# Patient Record
Sex: FEMALE | Race: WHITE | Employment: FULL TIME | URBAN - METROPOLITAN AREA
[De-identification: names, ages, dates, MRNs, and addresses within clinical notes are randomized per-mention and may not be internally consistent; named-entity substitution may affect disease eponyms.]

---

## 2021-02-10 ENCOUNTER — TELEPHONE (OUTPATIENT)
Dept: PRIMARY CARE CLINIC | Age: 26
End: 2021-02-10

## 2021-02-10 ENCOUNTER — VIRTUAL VISIT (OUTPATIENT)
Dept: PRIMARY CARE CLINIC | Age: 26
End: 2021-02-10
Payer: COMMERCIAL

## 2021-02-10 DIAGNOSIS — Z11.4 SCREENING FOR HUMAN IMMUNODEFICIENCY VIRUS: ICD-10-CM

## 2021-02-10 DIAGNOSIS — G43.829 PREMENSTRUAL HEADACHE: Primary | ICD-10-CM

## 2021-02-10 DIAGNOSIS — Z00.00 ROUTINE ADULT HEALTH MAINTENANCE: ICD-10-CM

## 2021-02-10 DIAGNOSIS — Z12.4 CERVICAL CANCER SCREENING: ICD-10-CM

## 2021-02-10 DIAGNOSIS — Z11.59 ENCOUNTER FOR HEPATITIS C SCREENING TEST FOR LOW RISK PATIENT: ICD-10-CM

## 2021-02-10 DIAGNOSIS — N92.6 IRREGULAR MENSTRUAL CYCLE: ICD-10-CM

## 2021-02-10 PROCEDURE — 99203 OFFICE O/P NEW LOW 30 MIN: CPT | Performed by: NURSE PRACTITIONER

## 2021-02-10 RX ORDER — MULTIVIT-MIN/IRON/FOLIC ACID/K 18-600-40
1 CAPSULE ORAL DAILY
COMMUNITY
Start: 2021-02-10 | End: 2021-04-20

## 2021-02-10 RX ORDER — MELATONIN
1000 DAILY
COMMUNITY
Start: 2021-02-10 | End: 2021-04-20

## 2021-02-10 RX ORDER — MULTIVIT-MIN/IRON FUM/FOLIC AC 7.5 MG-4
1 TABLET ORAL DAILY
COMMUNITY
Start: 2021-02-10 | End: 2021-04-20

## 2021-02-10 RX ORDER — NORETHINDRONE ACETATE AND ETHINYL ESTRADIOL 1.5-30(21)
1 KIT ORAL DAILY
Qty: 3 PACKET | Refills: 3 | Status: SHIPPED | OUTPATIENT
Start: 2021-02-10 | End: 2022-08-01

## 2021-02-10 RX ORDER — IBUPROFEN 200 MG
400 TABLET ORAL EVERY 8 HOURS PRN
COMMUNITY
Start: 2021-02-10 | End: 2021-04-20

## 2021-02-10 SDOH — ECONOMIC STABILITY: TRANSPORTATION INSECURITY
IN THE PAST 12 MONTHS, HAS LACK OF TRANSPORTATION KEPT YOU FROM MEETINGS, WORK, OR FROM GETTING THINGS NEEDED FOR DAILY LIVING?: NO

## 2021-02-10 SDOH — ECONOMIC STABILITY: FOOD INSECURITY: WITHIN THE PAST 12 MONTHS, THE FOOD YOU BOUGHT JUST DIDN'T LAST AND YOU DIDN'T HAVE MONEY TO GET MORE.: NEVER TRUE

## 2021-02-10 SDOH — ECONOMIC STABILITY: TRANSPORTATION INSECURITY
IN THE PAST 12 MONTHS, HAS THE LACK OF TRANSPORTATION KEPT YOU FROM MEDICAL APPOINTMENTS OR FROM GETTING MEDICATIONS?: NO

## 2021-02-10 SDOH — ECONOMIC STABILITY: INCOME INSECURITY: HOW HARD IS IT FOR YOU TO PAY FOR THE VERY BASICS LIKE FOOD, HOUSING, MEDICAL CARE, AND HEATING?: NOT HARD AT ALL

## 2021-02-10 SDOH — ECONOMIC STABILITY: FOOD INSECURITY: WITHIN THE PAST 12 MONTHS, YOU WORRIED THAT YOUR FOOD WOULD RUN OUT BEFORE YOU GOT MONEY TO BUY MORE.: NEVER TRUE

## 2021-02-10 ASSESSMENT — PATIENT HEALTH QUESTIONNAIRE - PHQ9
SUM OF ALL RESPONSES TO PHQ QUESTIONS 1-9: 0
SUM OF ALL RESPONSES TO PHQ QUESTIONS 1-9: 0
2. FEELING DOWN, DEPRESSED OR HOPELESS: 0

## 2021-02-10 NOTE — PATIENT INSTRUCTIONS
Patient Education        Well Visit, Ages 25 to 48: Care Instructions  Your Care Instructions     Physical exams can help you stay healthy. Your doctor has checked your overall health and may have suggested ways to take good care of yourself. He or she also may have recommended tests. At home, you can help prevent illness with healthy eating, regular exercise, and other steps. Follow-up care is a key part of your treatment and safety. Be sure to make and go to all appointments, and call your doctor if you are having problems. It's also a good idea to know your test results and keep a list of the medicines you take. How can you care for yourself at home? · Reach and stay at a healthy weight. This will lower your risk for many problems, such as obesity, diabetes, heart disease, and high blood pressure. · Get at least 30 minutes of physical activity on most days of the week. Walking is a good choice. You also may want to do other activities, such as running, swimming, cycling, or playing tennis or team sports. Discuss any changes in your exercise program with your doctor. · Do not smoke or allow others to smoke around you. If you need help quitting, talk to your doctor about stop-smoking programs and medicines. These can increase your chances of quitting for good. · Talk to your doctor about whether you have any risk factors for sexually transmitted infections (STIs). Having one sex partner (who does not have STIs and does not have sex with anyone else) is a good way to avoid these infections. · Use birth control if you do not want to have children at this time. Talk with your doctor about the choices available and what might be best for you. · Protect your skin from too much sun. When you're outdoors from 10 a.m. to 4 p.m., stay in the shade or cover up with clothing and a hat with a wide brim. Wear sunglasses that block UV rays. Even when it's cloudy, put broad-spectrum sunscreen (SPF 30 or higher) on any exposed skin. · See a dentist one or two times a year for checkups and to have your teeth cleaned. · Wear a seat belt in the car. Follow your doctor's advice about when to have certain tests. These tests can spot problems early. For everyone  · Cholesterol. Have the fat (cholesterol) in your blood tested after age 21. Your doctor will tell you how often to have this done based on your age, family history, or other things that can increase your risk for heart disease. · Blood pressure. Have your blood pressure checked during a routine doctor visit. Your doctor will tell you how often to check your blood pressure based on your age, your blood pressure results, and other factors. · Vision. Talk with your doctor about how often to have a glaucoma test.  · Diabetes. Ask your doctor whether you should have tests for diabetes. · Colon cancer. Your risk for colorectal cancer gets higher as you get older. Some experts say that adults should start regular screening at age 48 and stop at age 76. Others say to start before age 48 or continue after age 76. Talk with your doctor about your risk and when to start and stop screening. For women  · Breast exam and mammogram. Talk to your doctor about when you should have a clinical breast exam and a mammogram. Medical experts differ on whether and how often women under 50 should have these tests. Your doctor can help you decide what is right for you. · Cervical cancer screening test and pelvic exam. Begin with a Pap test at age 24. The test often is part of a pelvic exam. Starting at age 27, you may choose to have a Pap test, an HPV test, or both tests at the same time (called co-testing). Talk with your doctor about how often to have testing. · Tests for sexually transmitted infections (STIs). Ask whether you should have tests for STIs. You may be at risk if you have sex with more than one person, especially if your partners do not wear condoms. For men  · Tests for sexually transmitted infections (STIs). Ask whether you should have tests for STIs. You may be at risk if you have sex with more than one person, especially if you do not wear a condom. · Testicular cancer exam. Ask your doctor whether you should check your testicles regularly. · Prostate exam. Talk to your doctor about whether you should have a blood test (called a PSA test) for prostate cancer. Experts differ on whether and when men should have this test. Some experts suggest it if you are older than 39 and are -American or have a father or brother who got prostate cancer when he was younger than 72. When should you call for help? Watch closely for changes in your health, and be sure to contact your doctor if you have any problems or symptoms that concern you. Where can you learn more? Go to https://CloudJayaniya.healthVidSys. org and sign in to your [employer] account. Enter P072 in the [facility] box to learn more about \"Well Visit, Ages 25 to 48: Care Instructions. \"     If you do not have an account, please click on the \"Sign Up Now\" link. Current as of: May 27, 2020               Content Version: 12.6  © 4398-3268 [employer]. Care instructions adapted under license by [facility] ([facility]). If you have questions about a medical condition or this instruction, always ask your healthcare professional. [address] any warranty or liability for your use of this information. Patient Education        Headache: Care Instructions  Your Care Instructions     Headaches have many possible causes. Most headaches aren't a sign of a more serious problem, and they will get better on their own. Home treatment may help you feel better faster. The doctor has checked you carefully, but problems can develop later. If you notice any problems or new symptoms, get medical treatment right away. Follow-up care is a key part of your treatment and safety. Be sure to make and go to all appointments, and call your doctor if you are having problems. It's also a good idea to know your test results and keep a list of the medicines you take. How can you care for yourself at home? · Do not drive if you have taken a prescription pain medicine. · Rest in a quiet, dark room until your headache is gone. Close your eyes and try to relax or go to sleep. Don't watch TV or read. · Put a cold, moist cloth or cold pack on the painful area for 10 to 20 minutes at a time. Put a thin cloth between the cold pack and your skin. · Use a warm, moist towel or a heating pad set on low to relax tight shoulder and neck muscles. · Have someone gently massage your neck and shoulders. · Take pain medicines exactly as directed. ? If the doctor gave you a prescription medicine for pain, take it as prescribed. ? If you are not taking a prescription pain medicine, ask your doctor if you can take an over-the-counter medicine. · Be careful not to take pain medicine more often than the instructions allow, because you may get worse or more frequent headaches when the medicine wears off. · Do not ignore new symptoms that occur with a headache, such as a fever, weakness or numbness, vision changes, or confusion. These may be signs of a more serious problem. To prevent headaches  · Keep a headache diary so you can figure out what triggers your headaches. Avoiding triggers may help you prevent headaches. Record when each headache began, how long it lasted, and what the pain was like (throbbing, aching, stabbing, or dull). Write down any other symptoms you had with the headache, such as nausea, flashing lights or dark spots, or sensitivity to bright light or loud noise. Note if the headache occurred near your period. List anything that might have triggered the headache, such as certain foods (chocolate, cheese, wine) or odors, smoke, bright light, stress, or lack of sleep. · Find healthy ways to deal with stress. Headaches are most common during or right after stressful times. Take time to relax before and after you do something that has caused a headache in the past.  · Try to keep your muscles relaxed by keeping good posture. Check your jaw, face, neck, and shoulder muscles for tension, and try relaxing them. When sitting at a desk, change positions often, and stretch for 30 seconds each hour. · Get plenty of sleep and exercise. · Eat regularly and well. Long periods without food can trigger a headache. · Treat yourself to a massage. Some people find that regular massages are very helpful in relieving tension. · Limit caffeine by not drinking too much coffee, tea, or soda. But don't quit caffeine suddenly, because that can also give you headaches. · Reduce eyestrain from computers by blinking frequently and looking away from the computer screen every so often. Make sure you have proper eyewear and that your monitor is set up properly, about an arm's length away. · Seek help if you have depression or anxiety. Your headaches may be linked to these conditions. Treatment can both prevent headaches and help with symptoms of anxiety or depression. When should you call for help? Call 911 anytime you think you may need emergency care. For example, call if:    · You have signs of a stroke. These may include:  ? Sudden numbness, paralysis, or weakness in your face, arm, or leg, especially on only one side of your body. ? Sudden vision changes. ? Sudden trouble speaking. ? Sudden confusion or trouble understanding simple statements. ? Sudden problems with walking or balance. ? A sudden, severe headache that is different from past headaches. Call your doctor now or seek immediate medical care if:    · You have a new or worse headache.     · Your headache gets much worse. Where can you learn more? Go to https://Istpikaperayeweb.KakKstati. org and sign in to your "[employer]" account. Enter 0478 [id number] in the [employer] box to learn more about \"Headache: Care Instructions. \"     If you do not have an account, please click on the \"Sign Up Now\" link. Current as of: November 20, 2019               Content Version: 12.6  © 1658-4194 [employer]. Care instructions adapted under license by [facility] ([facility]). If you have questions about a medical condition or this instruction, always ask your healthcare professional. [address] any warranty or liability for your use of this information.

## 2021-02-10 NOTE — PROGRESS NOTES
2/10/2021    TELEHEALTH EVALUATION -- Audio/Visual (During ZCNQO-92 public health emergency)    Chief Complaint   Patient presents with    New Patient        HPI:    Iram Guadalupe (: 1995) has requested an audio/video evaluation for the following concern(s): To establish as a new patient to [facility] ([facility]) and myself. Hx of \"really bad headaches\" prior to starting period with headache lasting several days. Denies headache today. Denies headaches during menstrual period. Reports irregular periods; reports occasional months without having a period. Periods last anywhere from 5-7 days to 2 weeks. Patient has been sexually in the past, not currently. Menstrual cycle was regular while taking oral contraceptives in the past. Last taken 6 months ago. Stopped taking due to running out of prescription, moving to [place] and insurance change. Requests prescription for oral contraceptive today. First day of last menstrual period was 2021. Denies chance she could be pregnant today. Denies hx of blood clots or bleeding disorders. Denies family history of clotting disorders.        - Last PAP was 2019 by gynecologist in [facility]. Denies history of abnormal PAP. Requests referral to local gynecologist.     - Discussed screening for HIV & Hep C- will order today. - Patient does not take multivitamin or any supplements. - Influenza vaccine not administered for  flu season; strongly encouraged patient to be vaccinated at local pharmacy.   - Immunization record not on file; instructed patient to obtain records and fax to office (office card provided with referral information. Review of Systems:  Gen: Denies fever, chills, headaches. No weight loss. Eating and drinking to baseline. HEENT: Denies cold symptoms, sore throat. Denies changes in taste or smell. CV:  Denies chest pain or tightness, palpitations. Pulm: Denies shortness of breath, cough. Abd: Denies abdominal pain, change in bowel habits. Psych: Denies depression or anxiety. No current outpatient medications on file prior to visit. No current facility-administered medications on file prior to visit. Past Medical History:   Diagnosis Date    Polycystic ovarian syndrome 12/01/2019       History reviewed. No pertinent surgical history. Family History   Problem Relation Age of Onset    High Blood Pressure Mother     Heart Disease Father     High Blood Pressure Father     High Cholesterol Father     Other Father     No Known Problems Sister     Diabetes Maternal Grandmother     Diabetes Paternal Grandmother        Allergies   Allergen Reactions    Penicillins Shortness Of Breath    Tylenol [Acetaminophen] Hives       Social History     Tobacco Use    Smoking status: Never Smoker    Smokeless tobacco: Never Used   Substance Use Topics    Alcohol use: Yes     Comment: ocassional    Drug use: Never          PHYSICAL EXAMINATION:  Vital Signs: (As obtained by patient/caregiver or practitioner observation)  There were no vitals taken for this visit. Patient-Reported Vitals 2/10/2021   Patient-Reported Weight 186lb   Patient-Reported Height 5'3   Patient-Reported Temperature 97.4        Respiratory rate appears normal    Constitutional: Appears well-developed and well-nourished. No apparent distress    Mental status: Alert and awake. Oriented to person/place/time. Able to follow commands    Eyes: EOM normal. Sclera normal. No discharge visible  HENT: Normocephalic, atraumatic. Neck: No visualized mass   Pulmonary/Chest: Respiratory effort normal.  No visualized signs of difficulty breathing or respiratory distress. Speaking in full sentences without difficulty. Musculoskeletal: Normal range of motion of neck  Neurological: No Facial Asymmetry (Cranial nerve 7 motor function) (limited exam to video visit).  No gaze palsy       Skin: No significant exanthematous lesions or discoloration noted on facial skin No current facility-administered medications for this visit. Breezy Fraser is a 26 y.o. female being evaluated by a Virtual Visit (video visit) encounter to address concerns as mentioned above. A caregiver was present when appropriate. Due to this being a TeleHealth encounter (During SBEJX-17 public health emergency), evaluation of the following organ systems was limited: Vitals/Constitutional/EENT/Resp/CV/GI//MS/Neuro/Skin/Heme-Lymph-Imm. Pursuant to the emergency declaration under the [address] [place] [address], [address] [place] [address] authority and the [employer] Act, this Virtual Visit was conducted with patient's (and/or legal guardian's) consent, to reduce the patient's risk of exposure to COVID-19 and provide necessary medical care. The patient (and/or legal guardian) has also been advised to contact this office for worsening conditions or problems, and seek emergency medical treatment and/or call 911 if deemed necessary. Patient identification was verified at the start of the visit: Yes    Total time spent on this encounter: 25 minutes. Services were provided through a video synchronous discussion virtually to substitute for in-person clinic visit. Patient was located in their home. Provider was located in the office. --MARTA Zaragoza CNP on 2/10/2021 at 3:01 PM    An electronic signature was used to authenticate this note. Julia Caban

## 2021-04-20 ENCOUNTER — HOSPITAL ENCOUNTER (EMERGENCY)
Age: 26
Discharge: HOME | End: 2021-04-20
Payer: COMMERCIAL

## 2021-04-20 VITALS
TEMPERATURE: 98.2 F | HEART RATE: 108 BPM | OXYGEN SATURATION: 98 % | DIASTOLIC BLOOD PRESSURE: 90 MMHG | SYSTOLIC BLOOD PRESSURE: 137 MMHG | WEIGHT: 184.2 LBS | RESPIRATION RATE: 18 BRPM

## 2021-04-20 DIAGNOSIS — R51.9 ACUTE NONINTRACTABLE HEADACHE, UNSPECIFIED HEADACHE TYPE: Primary | ICD-10-CM

## 2021-04-20 LAB
ANION GAP SERPL CALCULATED.3IONS-SCNC: 8 MMOL/L (ref 3–16)
BASOPHILS ABSOLUTE: 0 K/UL (ref 0–0.2)
BASOPHILS RELATIVE PERCENT: 0.5 %
BUN BLDV-MCNC: 10 MG/DL (ref 7–20)
CALCIUM SERPL-MCNC: 9.1 MG/DL (ref 8.3–10.6)
CHLORIDE BLD-SCNC: 103 MMOL/L (ref 99–110)
CO2: 26 MMOL/L (ref 21–32)
CREAT SERPL-MCNC: 0.8 MG/DL (ref 0.6–1.1)
EOSINOPHILS ABSOLUTE: 0.3 K/UL (ref 0–0.6)
EOSINOPHILS RELATIVE PERCENT: 3.5 %
GFR AFRICAN AMERICAN: >60
GFR NON-AFRICAN AMERICAN: >60
GLUCOSE BLD-MCNC: 99 MG/DL (ref 70–99)
HCG QUALITATIVE: NEGATIVE
HCT VFR BLD CALC: 39.6 % (ref 36–48)
HEMOGLOBIN: 13.1 G/DL (ref 12–16)
LYMPHOCYTES ABSOLUTE: 2 K/UL (ref 1–5.1)
LYMPHOCYTES RELATIVE PERCENT: 22.6 %
MCH RBC QN AUTO: 29.5 PG (ref 26–34)
MCHC RBC AUTO-ENTMCNC: 33 G/DL (ref 31–36)
MCV RBC AUTO: 89.5 FL (ref 80–100)
MONOCYTES ABSOLUTE: 0.9 K/UL (ref 0–1.3)
MONOCYTES RELATIVE PERCENT: 10.4 %
NEUTROPHILS ABSOLUTE: 5.5 K/UL (ref 1.7–7.7)
NEUTROPHILS RELATIVE PERCENT: 63 %
PDW BLD-RTO: 13.8 % (ref 12.4–15.4)
PLATELET # BLD: 365 K/UL (ref 135–450)
PMV BLD AUTO: 8.9 FL (ref 5–10.5)
POTASSIUM REFLEX MAGNESIUM: 4.1 MMOL/L (ref 3.5–5.1)
RBC # BLD: 4.43 M/UL (ref 4–5.2)
SODIUM BLD-SCNC: 137 MMOL/L (ref 136–145)
WBC # BLD: 8.7 K/UL (ref 4–11)

## 2021-04-20 PROCEDURE — 2580000003 HC RX 258: Performed by: PHYSICIAN ASSISTANT

## 2021-04-20 PROCEDURE — 96375 TX/PRO/DX INJ NEW DRUG ADDON: CPT

## 2021-04-20 PROCEDURE — 99282 EMERGENCY DEPT VISIT SF MDM: CPT

## 2021-04-20 PROCEDURE — 80048 BASIC METABOLIC PNL TOTAL CA: CPT

## 2021-04-20 PROCEDURE — 6360000002 HC RX W HCPCS: Performed by: PHYSICIAN ASSISTANT

## 2021-04-20 PROCEDURE — 85025 COMPLETE CBC W/AUTO DIFF WBC: CPT

## 2021-04-20 PROCEDURE — 84703 CHORIONIC GONADOTROPIN ASSAY: CPT

## 2021-04-20 PROCEDURE — 96374 THER/PROPH/DIAG INJ IV PUSH: CPT

## 2021-04-20 RX ORDER — METOCLOPRAMIDE HYDROCHLORIDE 5 MG/ML
10 INJECTION INTRAMUSCULAR; INTRAVENOUS ONCE
Status: COMPLETED | OUTPATIENT
Start: 2021-04-20 | End: 2021-04-20

## 2021-04-20 RX ORDER — KETOROLAC TROMETHAMINE 30 MG/ML
15 INJECTION, SOLUTION INTRAMUSCULAR; INTRAVENOUS ONCE
Status: COMPLETED | OUTPATIENT
Start: 2021-04-20 | End: 2021-04-20

## 2021-04-20 RX ORDER — 0.9 % SODIUM CHLORIDE 0.9 %
1000 INTRAVENOUS SOLUTION INTRAVENOUS ONCE
Status: COMPLETED | OUTPATIENT
Start: 2021-04-20 | End: 2021-04-20

## 2021-04-20 RX ORDER — DIPHENHYDRAMINE HYDROCHLORIDE 50 MG/ML
25 INJECTION INTRAMUSCULAR; INTRAVENOUS ONCE
Status: COMPLETED | OUTPATIENT
Start: 2021-04-20 | End: 2021-04-20

## 2021-04-20 RX ADMIN — METOCLOPRAMIDE 10 MG: 5 INJECTION, SOLUTION INTRAMUSCULAR; INTRAVENOUS at 22:06

## 2021-04-20 RX ADMIN — DIPHENHYDRAMINE HYDROCHLORIDE 25 MG: 50 INJECTION, SOLUTION INTRAMUSCULAR; INTRAVENOUS at 22:05

## 2021-04-20 RX ADMIN — SODIUM CHLORIDE 1000 ML: 9 INJECTION, SOLUTION INTRAVENOUS at 22:04

## 2021-04-20 RX ADMIN — KETOROLAC TROMETHAMINE 15 MG: 30 INJECTION, SOLUTION INTRAMUSCULAR at 22:05

## 2021-04-20 NOTE — LETTER
[facility]  [address] [place] [address], [address]             April 20, 2021    Patient: Liyah Ortiz   YOB: 1995   Date of Visit: 4/20/2021       To Whom It May Concern:    Liyah Ortiz was seen and treated in our emergency department on 4/20/2021. She may return to work on 4/22/21.       Sincerely,         [facility]

## 2021-04-21 ASSESSMENT — ENCOUNTER SYMPTOMS
ABDOMINAL PAIN: 0
VOMITING: 0
SHORTNESS OF BREATH: 0
NAUSEA: 0

## 2021-04-21 NOTE — ED PROVIDER NOTES
[address] [facility]        Pt Name: Garcia Cuadra  MRN: [id number]  [place] 1995  Date of evaluation: 4/20/2021  Provider: Elie Bell PA-C  PCP: MARTA Chandler - CNP    SERGIO. I have evaluated this patient. My supervising physician was available for consultation. CHIEF COMPLAINT       Chief Complaint   Patient presents with    Migraine     pt. c/o headache states it is a migraine and feels dizzy. recent covid exposure. pt. got JJ covid vaccine on 3/14       HISTORY OF PRESENT ILLNESS   (Location, Timing/Onset, Context/Setting, Quality, Duration, Modifying Factors, Severity, Associated Signs and Symptoms)  Note limiting factors. Garcia Cuadra is a 26 y.o. female patient presents emergency department for evaluation of headache. Patient states it is a squeezing and throbbing sensation to the forehead and the back of her head. She states it has been off and on for the past few days. She states she has been unable to get it under control at home with ibuprofen or Excedrin. Patient has never seen neurology for her migraine headaches. She states she is not on any prescription medications. Patient got the [employer] vaccine on 3/14 but has felt fine since that time. Patient states this feels like her typical migraine headache. She states it is making her photophobic, slightly nauseated and slightly dizzy. She denies any vomiting, abdominal pain, chest pain or shortness of breath. She denies any neck pain or stiffness. Denies any fevers at home. Nursing Notes were all reviewed and agreed with or any disagreements were addressed in the HPI. REVIEW OF SYSTEMS    (2-9 systems for level 4, 10 or more for level 5)     Review of Systems   Constitutional: Negative for fatigue and fever. HENT: Negative. Eyes: Negative for visual disturbance. Respiratory: Negative for shortness of breath. Cardiovascular: Negative for chest pain. Gastrointestinal: Negative for abdominal pain, nausea and vomiting. Genitourinary: Negative. Musculoskeletal: Negative. Skin: Negative. Neurological: Positive for headaches. Positives and Pertinent negatives as per HPI. Except as noted above in the ROS, all other systems were reviewed and negative. PAST MEDICAL HISTORY     Past Medical History:   Diagnosis Date    Polycystic ovarian syndrome 12/01/2019         SURGICAL HISTORY   History reviewed. No pertinent surgical history. [id number]       Discharge Medication List as of 4/20/2021 11:31 PM      CONTINUE these medications which have NOT CHANGED    Details   norethindrone-ethinyl estradiol-iron (LOESTRIN FE 1.5/30) 1.5-30 MG-MCG tablet Take 1 tablet by mouth daily, Disp-3 packet, R-3Normal               ALLERGIES     Penicillins and Tylenol [acetaminophen]    FAMILYHISTORY       Family History   Problem Relation Age of Onset    High Blood Pressure Mother     Heart Disease Father     High Blood Pressure Father     High Cholesterol Father     Other Father     No Known Problems Sister     Diabetes Maternal Grandmother     Diabetes Paternal Grandmother           SOCIAL HISTORY       Social History     Tobacco Use    Smoking status: Never Smoker    Smokeless tobacco: Never Used   Substance Use Topics    Alcohol use: Yes     Comment: ocassional    Drug use: Never       SCREENINGS             PHYSICAL EXAM    (up to 7 for level 4, 8 or more for level 5)     ED Triage Vitals [04/20/21 2100]   BP Temp Temp Source Pulse Resp SpO2 Height Weight   (!) 137/90 98.2 °F (36.8 °C) Infrared 108 18 98 % -- 184 lb 3.2 oz (83.6 kg)       Physical Exam  Vitals signs and nursing note reviewed. Constitutional:       General: She is not in acute distress. Appearance: Normal appearance. She is well-developed. She is not ill-appearing, toxic-appearing or diaphoretic.    HENT:      Head: Normocephalic and atraumatic. Right Ear: Tympanic membrane, ear canal and external ear normal.      Left Ear: Tympanic membrane, ear canal and external ear normal.      Nose: Nose normal.   Eyes:      General:         Right eye: No discharge. Left eye: No discharge. Conjunctiva/sclera: Conjunctivae normal.      Pupils: Pupils are equal, round, and reactive to light. Neck:      Musculoskeletal: Normal range of motion and neck supple. No neck rigidity or muscular tenderness. Cardiovascular:      Rate and Rhythm: Normal rate and regular rhythm. Pulses: Normal pulses. Heart sounds: No murmur. No gallop. Pulmonary:      Effort: Pulmonary effort is normal. No respiratory distress. Breath sounds: No wheezing, rhonchi or rales. Musculoskeletal: Normal range of motion. Skin:     General: Skin is warm and dry. Capillary Refill: Capillary refill takes less than 2 seconds. Coloration: Skin is not jaundiced or pale. Findings: No rash. Neurological:      General: No focal deficit present. Mental Status: She is alert and oriented to person, place, and time. Psychiatric:         Behavior: Behavior normal.         DIAGNOSTIC RESULTS   LABS:    Labs Reviewed   CBC WITH AUTO DIFFERENTIAL    Narrative:     Performed at:  [facility] [address], [place], [facility]   Phone [phone]   BASIC METABOLIC PANEL W/ REFLEX TO MG FOR LOW K    Narrative:     Performed at:  [facility] [address], [place], [facility] [place]   Phone [phone]   HCG, SERUM, QUALITATIVE    Narrative:     Performed at:  [facility] [address], [place], [facility] [place]   Phone [phone]       All other labs were within normal range or not returned as of this dictation. EKG:  All EKG's are interpreted by the Emergency Department Physician in the absence of a cardiologist.  Please see their note for interpretation of EKG. RADIOLOGY:   Non-plain film images such as CT, Ultrasound and MRI are read by the radiologist. Plain radiographic images are visualized and preliminarily interpreted by the ED Provider with the below findings:        Interpretation per the Radiologist below, if available at the time of this note:    No orders to display     No results found. PROCEDURES   Unless otherwise noted below, none     Procedures    CRITICAL CARE TIME   N/A    CONSULTS:  None      EMERGENCY DEPARTMENT COURSE and DIFFERENTIAL DIAGNOSIS/MDM:   Vitals:    Vitals:    04/20/21 2100   BP: (!) 137/90   Pulse: 108   Resp: 18   Temp: 98.2 °F (36.8 °C)   TempSrc: Infrared   SpO2: 98%   Weight: 184 lb 3.2 oz (83.6 kg)       Patient was given the following medications:  Medications   0.9 % sodium chloride bolus (0 mLs Intravenous Stopped 4/20/21 2315)   ketorolac (TORADOL) injection 15 mg (15 mg Intravenous Given 4/20/21 2205)   diphenhydrAMINE (BENADRYL) injection 25 mg (25 mg Intravenous Given 4/20/21 2205)   metoclopramide (REGLAN) injection 10 mg (10 mg Intravenous Given 4/20/21 2206)       Patient presents emergency department for evaluation of headache. Patient states this feels like her typical migraine type headache. She states it is squeezing it to her forehead and the back of her head. She states it is occasionally throbbing. She states it makes her photophobic and lightheaded. Patient has not had any syncope. She denies any room spinning dizziness. Patient was given saline Toradol Benadryl Reglan. On examination pupils are equal and reactive to light. TMs normal bilaterally without evidence of hemotympanum. Patient's lungs are clear to auscultation bilaterally. Heart rate is regular without murmurs rubs or gallops. No neck rigidity or meningismus. She does not appear acutely ill. On reevaluation patient states she is feeling much improved.   She states she is feeling well enough to go home at this time. She was encouraged to follow-up with PCP if she notes that her headaches are becoming more frequent. She additionally was encouraged to try magnesium or riboflavin daily for prevention of her migraines. Patient expresses understanding will be discharged home. Patient's repeat neurologic examination is normal.  My suspicion for serious pathology is low given a lack of significant risk factors and reassuring history and physical examination. I see nothing to suggest intracranial hemorrhage, meningitis, encephalitis, mass lesion, stroke or thrombosis. I feel the patient can be safely discharged to home with outpatient follow up. Instructions have been given for the patient to return if there is any significant worsening of the headache or the development of confusion, vision change, weakness, numbness, difficulty with speech or walking. FINAL IMPRESSION      1.  Acute nonintractable headache, unspecified headache type          DISPOSITION/PLAN   DISPOSITION Decision To Discharge 04/20/2021 11:28:48 PM      PATIENT REFERREDTO:  [facility] Emergency Department  [address] [facility]  [phone]    If symptoms worsen    MARTA Koenig - JESSI  [address] [place] [address]. [address]  [phone]    Schedule an appointment as soon as possible for a visit in 3 days  for re-evaluation      DISCHARGE MEDICATIONS:  Discharge Medication List as of 4/20/2021 11:31 PM          DISCONTINUED MEDICATIONS:  Discharge Medication List as of 4/20/2021 11:31 PM      STOP taking these medications       Multiple Vitamins-Minerals (MULTIVITAMIN WITH MINERALS) tablet Comments:   Reason for Stopping:         vitamin D3 (CHOLECALCIFEROL) 25 MCG (1000 UT) TABS tablet Comments:   Reason for Stopping:         Ascorbic Acid (VITAMIN C) 500 MG CAPS Comments:   Reason for Stopping:         ibuprofen (ADVIL) 200 MG tablet Comments:   Reason for Stopping:

## 2021-04-30 ENCOUNTER — OFFICE VISIT (OUTPATIENT)
Dept: PRIMARY CARE CLINIC | Age: 26
End: 2021-04-30
Payer: COMMERCIAL

## 2021-04-30 VITALS
OXYGEN SATURATION: 98 % | WEIGHT: 189 LBS | HEART RATE: 94 BPM | HEIGHT: 64 IN | BODY MASS INDEX: 32.27 KG/M2 | DIASTOLIC BLOOD PRESSURE: 82 MMHG | SYSTOLIC BLOOD PRESSURE: 132 MMHG

## 2021-04-30 DIAGNOSIS — R20.2 RIGHT HAND PARESTHESIA: ICD-10-CM

## 2021-04-30 DIAGNOSIS — M25.531 RIGHT WRIST PAIN: ICD-10-CM

## 2021-04-30 DIAGNOSIS — G43.109 MIGRAINE WITH AURA AND WITHOUT STATUS MIGRAINOSUS, NOT INTRACTABLE: Primary | ICD-10-CM

## 2021-04-30 PROCEDURE — 99214 OFFICE O/P EST MOD 30 MIN: CPT | Performed by: NURSE PRACTITIONER

## 2021-04-30 PROCEDURE — 1111F DSCHRG MED/CURRENT MED MERGE: CPT | Performed by: NURSE PRACTITIONER

## 2021-04-30 RX ORDER — ONDANSETRON HYDROCHLORIDE 8 MG/1
8 TABLET, FILM COATED ORAL EVERY 8 HOURS PRN
Qty: 20 TABLET | Refills: 1 | Status: SHIPPED | OUTPATIENT
Start: 2021-04-30 | End: 2021-12-17 | Stop reason: SDUPTHER

## 2021-04-30 RX ORDER — SUMATRIPTAN 50 MG/1
TABLET, FILM COATED ORAL
Qty: 12 TABLET | Refills: 2 | Status: SHIPPED | OUTPATIENT
Start: 2021-04-30 | End: 2022-08-01

## 2021-04-30 ASSESSMENT — ENCOUNTER SYMPTOMS: RESPIRATORY NEGATIVE: 1

## 2021-04-30 NOTE — PATIENT INSTRUCTIONS
Patient Education        Migraine Headache: Care Instructions  Overview     Migraines are painful, throbbing headaches that often start on one side of the head. They may cause nausea and vomiting and make you sensitive to light, sound, or smell. Without treatment, migraines can last from 4 hours to a few days. Medicines can help prevent migraines or stop them after they have started. Your doctor can help you find which ones work best for you. Follow-up care is a key part of your treatment and safety. Be sure to make and go to all appointments, and call your doctor if you are having problems. It's also a good idea to know your test results and keep a list of the medicines you take. How can you care for yourself at home? · Do not drive if you have taken a prescription pain medicine. · Rest in a quiet, dark room until your headache is gone. Close your eyes, and try to relax or go to sleep. Don't watch TV or read. · Put a cold, moist cloth or cold pack on the painful area for 10 to 20 minutes at a time. Put a thin cloth between the cold pack and your skin. · Use a warm, moist towel or a heating pad set on low to relax tight shoulder and neck muscles. · Have someone gently massage your neck and shoulders. · Take your medicines exactly as prescribed. Call your doctor if you think you are having a problem with your medicine. You will get more details on the specific medicines your doctor prescribes. · Don't take medicine for headache pain too often. Talk to your doctor if you are taking medicine more than 2 days a week to stop a headache. Taking too much pain medicine can lead to more headaches. These are called medicine-overuse headaches. To prevent migraines  · Keep a headache diary so you can figure out what triggers your headaches. Avoiding triggers may help you prevent headaches. Record when each headache began, how long it lasted, and what the pain was like.  Write down any other symptoms you had with the headache, such as nausea, flashing lights or dark spots, or sensitivity to bright light or loud noise. Note if the headache occurred near your period. List anything that might have triggered the headache. Triggers may include certain foods (chocolate, cheese, wine) or odors, smoke, bright light, stress, or lack of sleep. · If your doctor has prescribed medicine for your migraines, take it as directed. You may have medicine that you take only when you get a migraine and medicine that you take all the time to help prevent migraines. ? If your doctor has prescribed medicine for when you get a headache, take it at the first sign of a migraine, unless your doctor has given you other instructions. ? If your doctor has prescribed medicine to prevent migraines, take it exactly as prescribed. Call your doctor if you think you are having a problem with your medicine. · Find healthy ways to deal with stress. Migraines are most common during or right after stressful times. Try finding ways to reduce stress like practicing mindfulness or deep breathing exercises. · Get plenty of sleep and exercise. But be careful to not push yourself too hard during exercise. It may trigger a headache. · Eat meals on a regular schedule. Avoid foods and drinks that often trigger migraines. These include chocolate, alcohol (especially red wine and port), aspartame, monosodium glutamate (MSG), and some additives found in foods (such as hot dogs, gilbert, cold cuts, aged cheeses, and pickled foods). · Limit caffeine. Don't drink too much coffee, tea, or soda. But don't quit caffeine suddenly. That can also give you migraines. · Do not smoke or allow others to smoke around you. If you need help quitting, talk to your doctor about stop-smoking programs and medicines. These can increase your chances of quitting for good.   · If you are taking birth control pills or hormone therapy, talk to your doctor about whether they are triggering your migraines. When should you call for help? Call 911 anytime you think you may need emergency care. For example, call if:    · You have signs of a stroke. These may include:  ? Sudden numbness, paralysis, or weakness in your face, arm, or leg, especially on only one side of your body. ? Sudden vision changes. ? Sudden trouble speaking. ? Sudden confusion or trouble understanding simple statements. ? Sudden problems with walking or balance. ? A sudden, severe headache that is different from past headaches. Call your doctor now or seek immediate medical care if:    · You have new or worse nausea and vomiting.     · You have a new or higher fever.     · Your headache gets much worse. Watch closely for changes in your health, and be sure to contact your doctor if:    · You are not getting better after 2 days (48 hours). Where can you learn more? Go to https://RC Transportation.Intellikine. org and sign in to your [email or web address] account. Enter U318 in the Rally.org box to learn more about \"Migraine Headache: Care Instructions. \"     If you do not have an account, please click on the \"Sign Up Now\" link. Current as of: August 4, 2020               Content Version: 12.8  © 2006-2021 [employer]. Care instructions adapted under license by [facility] ([facility]). If you have questions about a medical condition or this instruction, always ask your healthcare professional. [place] [address] any warranty or liability for your use of this information. Patient Education        Deciding About Taking Medicine to Prevent Migraines  How can you decide about taking medicine to prevent migraine headaches? What are migraines? Migraines are painful, throbbing headaches. They can last from 4 to 72 hours. They often occur on only one side of your head. But you may feel them on both sides. The pain may keep you from doing your daily activities.   You may take a daily medicine if you get bad Now\" link. Current as of: August 4, 2020               Content Version: 12.8  © 2006-2021 [employer]. Care instructions adapted under license by [facility] ([facility]). If you have questions about a medical condition or this instruction, always ask your healthcare professional. [address] any warranty or liability for your use of this information. Patient Education        Numbness and Tingling: Care Instructions  Your Care Instructions     Many things can cause numbness or tingling. Swelling may put pressure on a nerve. This could cause you to lose feeling or have a pins-and-needles sensation on part of your body. Nerves may be damaged from trauma, toxins, or diseases, such as diabetes or multiple sclerosis (MS). Sometimes, though, the cause is not clear. If there is no clear reason for your symptoms, and you are not having any other symptoms, your doctor may suggest watching and waiting for a while to see if the numbness or tingling goes away on its own. Your doctor may want you to have blood or nerve tests to find the cause of your symptoms. Follow-up care is a key part of your treatment and safety. Be sure to make and go to all appointments, and call your doctor if you are having problems. It's also a good idea to know your test results and keep a list of the medicines you take. How can you care for yourself at home? · If your doctor prescribes medicine, take it exactly as directed. Call your doctor if you think you are having a problem with your medicine. · If you have any swelling, put ice or a cold pack on the area for 10 to 20 minutes at a time. Put a thin cloth between the ice and your skin. When should you call for help? Call 911 anytime you think you may need emergency care. For example, call if:    · You have weakness, numbness, or tingling in both legs.     · You lose bowel or bladder control.     · You have symptoms of a stroke.  These may include:  ? Sudden numbness, tingling, weakness, or loss of movement in your face, arm, or leg, especially on only one side of your body. ? Sudden vision changes. ? Sudden trouble speaking. ? Sudden confusion or trouble understanding simple statements. ? Sudden problems with walking or balance. ? A sudden, severe headache that is different from past headaches. Watch closely for changes in your health, and be sure to contact your doctor if you have any problems, or if:    · You do not get better as expected. Where can you learn more? Go to https://QuviumpepicDockPHPeb.TeraFirrma. org and sign in to your Admaxim account. Enter Q271 in the Code Blue box to learn more about \"Numbness and Tingling: Care Instructions. \"     If you do not have an account, please click on the \"Sign Up Now\" link. Current as of: August 4, 2020               Content Version: 12.8  © 2377-1563 [employer]ionetics. Care instructions adapted under license by [facility] ([facility]). If you have questions about a medical condition or this instruction, always ask your healthcare professional. [place] [address] any warranty or liability for your use of this information. Patient Education        Carpal Tunnel Syndrome: Care Instructions  Overview     Carpal tunnel syndrome is numbness, tingling, weakness, and pain in your hand, wrist, and sometimes forearm. It is caused by pressure on the median nerve. This nerve and several tough tissues called tendons run through a space in the wrist. This space is called the carpal tunnel. The repeated hand motions used in work and some hobbies and sports can put pressure on the median nerve. Pregnancy can cause carpal tunnel syndrome. Several conditions, such as diabetes, arthritis, and an underactive thyroid, can also cause it. You may be able to limit an activity or change the way you do it to reduce your symptoms. You also can take other steps to feel better.  If your symptoms are mild, 1 to 2 weeks of home treatment are likely to ease your pain. Surgery is needed only if other treatments do not work. Follow-up care is a key part of your treatment and safety. Be sure to make and go to all appointments, and call your doctor if you are having problems. It's also a good idea to know your test results and keep a list of the medicines you take. How can you care for yourself at home? · If possible, stop or reduce the activity that causes your symptoms. If you cannot stop the activity, take frequent breaks to rest and stretch or change hand positions to do a task. Try switching hands, such as when using a computer mouse. · Try to avoid bending or twisting your wrists. · Ask your doctor if you can take an over-the-counter pain medicine, such as acetaminophen (Tylenol), ibuprofen (Advil, Motrin), or naproxen (Aleve). Be safe with medicines. Read and follow all instructions on the label. · If your doctor prescribes corticosteroid medicine to help reduce pain and swelling, take it exactly as prescribed. Call your doctor if you think you are having a problem with your medicine. · Put ice or a cold pack on your wrist for 10 to 20 minutes at a time to ease pain. Put a thin cloth between the ice and your skin. · If your doctor or your physical or occupational therapist tells you to wear a wrist splint, wear it as directed to keep your wrist in a neutral position. This also eases pressure on your median nerve. · Ask your doctor whether you should have physical or occupational therapy to learn how to do tasks differently. · Try a yoga class to stretch your muscles and build strength in your hands and wrists. Yoga has been shown to ease carpal tunnel symptoms. To prevent carpal tunnel  · When working at a computer, keep your hands and wrists in line with your forearms. Hold your elbows close to your sides. Take a break every 10 to 15 minutes.   · Try these exercises:  ? Warm up: Rotate your wrist up, down, and from side to side. Repeat this 4 times. Stretch your fingers far apart, relax them, then stretch them again. Repeat 4 times. Stretch your thumb by pulling it back gently, holding it, and then releasing it. Repeat 4 times. ? Prayer stretch: Start with your palms together in front of your chest just below your chin. Slowly lower your hands toward your waistline while keeping your hands close to your stomach and your palms together until you feel a mild to moderate stretch under your forearms. Hold for 10 to 20 seconds. Repeat 4 times. ? Wrist flexor stretch: Hold your arm in front of you with your palm up. Bend your wrist, pointing your hand toward the floor. With your other hand, gently bend your wrist further until you feel a mild to moderate stretch in your forearm. Hold for 10 to 20 seconds. Repeat 4 times. ? Wrist extensor stretch: Repeat the steps for the wrist flexor stretch, but begin with your extended hand palm down. · Squeeze a rubber exercise ball several times a day to keep your hands and fingers strong. · Avoid holding objects (such as a book) in one position for a long time. When possible, use your whole hand to grasp an object. Using just the thumb and index finger can put stress on the wrist.  · Do not smoke. It can make this condition worse by reducing blood flow to the median nerve. If you need help quitting, talk to your doctor about stop-smoking programs and medicines. These can increase your chances of quitting for good. When should you call for help? Watch closely for changes in your health, and be sure to contact your doctor if:    · Your pain or other problems do not get better with home care.     · You want more information about physical or occupational therapy.     · You have side effects of your corticosteroid medicine, such as:  ? Weight gain. ? Mood changes. ? Trouble sleeping. ? Bruising easily.     · You have any other problems with your medicine.    Where can you learn more?  Go to https://chpepiceweb.Fruition [email or web address] org and sign in to your Drive Power account. Enter R432 in the baimos technologieshire box to learn more about \"Carpal Tunnel Syndrome: Care Instructions. \"     If you do not have an account, please click on the \"Sign Up Now\" link. Current as of: November 16, 2020               Content Version: 12.8  © 2006-2021 [employer]Wireless. Care instructions adapted under license by [facility] ([facility]). If you have questions about a medical condition or this instruction, always ask your healthcare professional. [place] [address] any warranty or liability for your use of this information. Patient Education        Carpal Tunnel Syndrome: Exercises  Introduction  Here are some examples of exercises for you to try. The exercises may be suggested for a condition or for rehabilitation. Start each exercise slowly. Ease off the exercises if you start to have pain. You will be told when to start these exercises and which ones will work best for you. Warm-up stretches  When you no longer have pain or numbness, you can do exercises to help prevent carpal tunnel syndrome from coming back. Do not do any stretch or movement that is uncomfortable or painful. 1. Rotate your wrist up, down, and from side to side. Repeat 4 times. 2. Stretch your fingers far apart. Relax them, and then stretch them again. Repeat 4 times. 3. Stretch your thumb by pulling it back gently, holding it, and then releasing it. Repeat 4 times. How to do the exercises  Prayer stretch   1. Start with your palms together in front of your chest just below your chin. 2. Slowly lower your hands toward your waistline, keeping your hands close to your stomach and your palms together until you feel a mild to moderate stretch under your forearms. 3. Hold for at least 15 to 30 seconds. Repeat 2 to 4 times. Wrist flexor stretch   1. Extend your arm in front of you with your palm up.   2. [place]

## 2021-04-30 NOTE — PROGRESS NOTES
4/30/2021    Chief Complaint   Patient presents with    Follow-up     migraine follow up from the [facility]       Diana Mena is a 26 y.o. female, presents today for emergency room follow up from 4/20/2021. She was treated and released same day. She also has concern for right wrist pain. Migraine Headaches  Patient was evaluated in the emergency department on 4/20/2021 for \"migraine headache that was not similar to any migraine headache she had experienced in the past\". She reports having a migraine headaches \"every couple days, occurring more frequently than in the past\". She started tracking headaches within the past week. She reports aura of ringing of right ear. Headaches typically starts on right frontal lobe and pain spreads. Headaches typically last 24 hours or greater. She takes Motrin 800 mg at onset of headache, with little to no improvement. She has a history of premenstrual migraines. She is not taking medications for headache prevention or Sumatriptan- Will initiate both today. She denies recent changes to vision. Patient wears glasses at all times; last vision exam was a little over a year ago. Patient states she will schedule an appointment. Right Wrist Pain  Patient presents for evaluation of right wrist pain in hands and right hand paresthesias. Onset of the symptoms was several months ago. Current symptoms include: pain involving the wrist, tingling/numbness involving the right hand/fingers and weakness involving right hand and wrist. Aggravating factors: worse with activity. Symptoms have gradually worsened. Evaluation to date: none. Treatment to date: wrist splints used for several weeks, which has been somewhat effective. 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 She denies recent trauma or injuries to right wrist/hand. Review of Systems   Constitutional: Negative for activity change and fatigue. Respiratory: Negative. Appearance: Normal appearance. Eyes:      Extraocular Movements: Extraocular movements intact. Conjunctiva/sclera: Conjunctivae normal.      Pupils: Pupils are equal, round, and reactive to light. Neck:      Musculoskeletal: Normal range of motion and neck supple. Vascular: No carotid bruit. Cardiovascular:      Rate and Rhythm: Normal rate and regular rhythm. Pulses: Normal pulses. Heart sounds: Normal heart sounds. No murmur. Pulmonary:      Effort: Pulmonary effort is normal.      Breath sounds: Normal breath sounds. Musculoskeletal:      Right wrist: She exhibits tenderness. She exhibits normal range of motion, no bony tenderness, no swelling and no deformity. Left wrist: Normal.      Right hand: She exhibits normal range of motion, no tenderness, no bony tenderness, normal capillary refill, no deformity and no swelling. Normal sensation noted. Decreased strength noted. Left hand: Normal.      Comments: Positive Phalen and Tinel test. Paresthesia provoked with manual carpal compression and elevation of right upper extremity. Lymphadenopathy:      Cervical: No cervical adenopathy. Skin:     General: Skin is warm. Neurological:      General: No focal deficit present. Mental Status: She is alert and oriented to person, place, and time. Psychiatric:         Mood and Affect: Mood normal.         Behavior: Behavior normal.         ASSESSMENT/PLAN:  1. Migraine with aura and without status migrainosus, not intractable  - Uncontrolled. - Start SUMAtriptan (IMITREX) 50 MG tablet; Take 1 tablet at onset of migraine headache. May take additional tablet one hour if no improvement. Do not take more than 2 tablets in 24 hours. Dispense: 12 tablet; Refill: 2  - Start metoprolol tartrate (LOPRESSOR) 25 MG tablet; Take 1 tablet by mouth 2 times daily  Dispense: 60 tablet; Refill: 1 (Max dose 200 mg/day in divided doses. - Start ondansetron (ZOFRAN) 8 MG tablet;  Take 1

## 2021-05-21 ENCOUNTER — PROCEDURE VISIT (OUTPATIENT)
Dept: NEUROLOGY | Age: 26
End: 2021-05-21
Payer: COMMERCIAL

## 2021-05-21 DIAGNOSIS — M79.601 RIGHT ARM PAIN: Primary | ICD-10-CM

## 2021-05-21 PROCEDURE — 95909 NRV CNDJ TST 5-6 STUDIES: CPT | Performed by: PSYCHIATRY & NEUROLOGY

## 2021-05-21 PROCEDURE — 95886 MUSC TEST DONE W/N TEST COMP: CPT | Performed by: PSYCHIATRY & NEUROLOGY

## 2021-05-22 DIAGNOSIS — G43.109 MIGRAINE WITH AURA AND WITHOUT STATUS MIGRAINOSUS, NOT INTRACTABLE: ICD-10-CM

## 2021-06-03 ENCOUNTER — OFFICE VISIT (OUTPATIENT)
Dept: PRIMARY CARE CLINIC | Age: 26
End: 2021-06-03
Payer: COMMERCIAL

## 2021-06-03 VITALS
BODY MASS INDEX: 32.78 KG/M2 | SYSTOLIC BLOOD PRESSURE: 122 MMHG | OXYGEN SATURATION: 98 % | HEIGHT: 64 IN | WEIGHT: 192 LBS | HEART RATE: 108 BPM | DIASTOLIC BLOOD PRESSURE: 84 MMHG

## 2021-06-03 DIAGNOSIS — G43.109 MIGRAINE WITH AURA AND WITHOUT STATUS MIGRAINOSUS, NOT INTRACTABLE: Primary | ICD-10-CM

## 2021-06-03 PROCEDURE — 99213 OFFICE O/P EST LOW 20 MIN: CPT | Performed by: NURSE PRACTITIONER

## 2021-06-03 RX ORDER — IBUPROFEN 800 MG/1
800 TABLET ORAL EVERY 8 HOURS PRN
Qty: 30 TABLET | Refills: 1 | Status: SHIPPED | OUTPATIENT
Start: 2021-06-03 | End: 2021-07-08 | Stop reason: SDUPTHER

## 2021-06-03 RX ORDER — METOPROLOL TARTRATE 50 MG/1
50 TABLET, FILM COATED ORAL 2 TIMES DAILY
Qty: 60 TABLET | Refills: 1 | Status: SHIPPED | OUTPATIENT
Start: 2021-06-03 | End: 2021-07-19 | Stop reason: SDUPTHER

## 2021-06-03 ASSESSMENT — ENCOUNTER SYMPTOMS
SHORTNESS OF BREATH: 0
CHEST TIGHTNESS: 0

## 2021-06-03 NOTE — PATIENT INSTRUCTIONS
Patient Education        Recurring Migraine Headache: Care Instructions  Overview  Migraines are painful, throbbing headaches. They often start on one side of the head. They may cause nausea and vomiting and make you sensitive to light, sound, or smell. Some people may have only a few migraines throughout life. Others have them as often as several times a month. The goal of treatment is to reduce the number of migraines you have and relieve your symptoms. Even with treatment, you may continue to have migraines. You play an important role in dealing with your headaches. Work on avoiding things that seem to trigger your migraines. When you feel a headache coming on, act quickly to stop it before it gets worse. Follow-up care is a key part of your treatment and safety. Be sure to make and go to all appointments, and call your doctor if you are having problems. It's also a good idea to know your test results and keep a list of the medicines you take. How can you care for yourself at home? · Do not drive if you have taken a prescription pain medicine. · Rest in a quiet, dark room until your headache is gone. Close your eyes and try to relax or go to sleep. Do not watch TV or read. · Put a cold, moist cloth or cold pack on the painful area for 10 to 20 minutes at a time. Put a thin cloth between the cold pack and your skin. · Have someone gently massage your neck and shoulders. · Take your medicines exactly as prescribed. Call your doctor if you think you are having a problem with your medicine. You will get more details on the specific medicines your doctor prescribes. · Don't take medicine for headache pain too often. Talk to your doctor if you are taking medicine more than 2 days a week to stop a headache. Taking too much pain medicine can lead to more headaches. These are called medicine-overuse headaches. To prevent migraines  · Keep a headache diary so you can figure out what triggers your headaches. Avoiding triggers may help you prevent headaches. Record when each headache began, how long it lasted, and what the pain was like. Write down any other symptoms you had with the headache. These may include nausea, flashing lights or dark spots, or sensitivity to bright light or loud noise. Note if the headache occurred near your period. List anything that might have triggered the headache. Triggers may include certain foods (chocolate, cheese, wine) or odors, smoke, bright light, stress, or lack of sleep. · If your doctor has prescribed medicine for your migraines, take it as directed. You may have medicine that you take only when you get a migraine and medicine that you take all the time to help prevent migraines. ? If your doctor has prescribed medicine for when you get a headache, take it at the first sign of a migraine, unless your doctor has given you other instructions. ? If your doctor has prescribed medicine to prevent migraines, take it exactly as prescribed. Call your doctor if you think you are having a problem with your medicine. · Find healthy ways to deal with stress. Migraines are most common during or right after stressful times. Try finding ways to reduce stress like practicing mindfulness or deep breathing exercises. · Get regular sleep and exercise. But be careful to not push yourself too hard during exercise. It may trigger a headache. · Eat regular meals, and avoid foods and drinks that often trigger migraines. These include chocolate and alcohol, especially red wine and port. Chemicals used in food, such as aspartame and monosodium glutamate (MSG), also can trigger migraines. So can some food additives, such as those found in hot dogs, gilbert, cold cuts, aged cheeses, and pickled foods. · Limit caffeine by not drinking too much coffee, tea, or soda. Do not quit caffeine suddenly, because that can also give you migraines. · Do not smoke or allow others to smoke around you.  If you need help quitting, talk to your doctor about stop-smoking programs and medicines. These can increase your chances of quitting for good. · If you are taking birth control pills or hormone therapy, talk to your doctor about whether they are triggering your migraines. When should you call for help? Call 911 anytime you think you may need emergency care. For example, call if:    · You have symptoms of a stroke. These may include:  ? Sudden numbness, tingling, weakness, or loss of movement in your face, arm, or leg, especially on only one side of your body. ? Sudden vision changes. ? Sudden trouble speaking. ? Sudden confusion or trouble understanding simple statements. ? Sudden problems with walking or balance. ? A sudden, severe headache that is different from past headaches. Call your doctor now or seek immediate medical care if:    · You develop a fever and a stiff neck.     · You have new nausea and vomiting, or you cannot keep down food or liquids. Watch closely for changes in your health, and be sure to contact your doctor if:    · You have a headache that does not get better within 1 or 2 days.     · Your headaches get worse or happen more often. Where can you learn more? Go to [email or web address].[email or web address] org and sign in to your [employer] account. Enter  in the [employer] box to learn more about \"Recurring Migraine Headache: Care Instructions. \"     If you do not have an account, please click on the \"Sign Up Now\" link. Current as of: August 4, 2020               Content Version: 12.8  © 2105-5843 Healthwise, Incorporated. Care instructions adapted under license by [facility] ([facility]). If you have questions about a medical condition or this instruction, always ask your healthcare professional. [place] [address] any warranty or liability for your use of this information.

## 2021-06-03 NOTE — PROGRESS NOTES
6/3/2021    Chief Complaint   Patient presents with    1 Month Follow-Up     Migraine Headaches       Liyah Ortiz is a 26 y.o. female, presents today for follow up of migraine headaches    HPI   Miagraine Headaches  Patient presents for 1 month follow up of migraine headaches. She reports 3-4 migraine headaches a week since Metoprolol was initiated 4 weeks ago. She was previously experiencing 5-6 migraines per week. She reports \"Migraine headaches are not as intense and is able to work through them\". Discussed with patient she is taking lowest dose of Metoprolol and will increase from 25 mg twice daily to 50 mg twice daily. Max dose is 100 mg twice daily (200 mg max daily). Patient verbalizes understanding she may notice a decrease in frequency and intense of headaches with dosage increase. She admits to not taking Imitrex at onset of headaches due to \"feeling funny after taking\". She has been taking Motrin 800 mg every 6 hours at onset of headache- instructed patient to take every 8 hours (with food) instead of every 6 hours. She has only taken Zofran a couple times for nausea/vomiting associated with headaches. Known triggers: Weather changes and lack of sleep. Instructed patient to get plenty of sleep every night with set sleeping hours even on weekends. Start regular exercise (brisk walking 30 minutes 5 times weekly). Review of Systems   Constitutional: Negative for activity change and fatigue. Eyes: Negative for visual disturbance. Respiratory: Negative for chest tightness and shortness of breath. Cardiovascular: Negative for chest pain, palpitations and leg swelling. Neurological: Positive for headaches (frequency and intensity improving). Negative for dizziness, syncope, weakness, light-headedness and numbness. Psychiatric/Behavioral: Negative.         Current Outpatient Medications on File Prior to Visit   Medication Sig Dispense Refill    SUMAtriptan (IMITREX) 50 MG tablet on 6/3/2021 at 4:58 PM

## 2021-07-07 ENCOUNTER — TELEPHONE (OUTPATIENT)
Dept: PRIMARY CARE CLINIC | Age: 26
End: 2021-07-07

## 2021-07-07 NOTE — TELEPHONE ENCOUNTER
Patient is scheduled tomorrow during Covid clinic hours, called and left msg for patient to call back and reschedule

## 2021-07-08 DIAGNOSIS — G43.109 MIGRAINE WITH AURA AND WITHOUT STATUS MIGRAINOSUS, NOT INTRACTABLE: ICD-10-CM

## 2021-07-08 DIAGNOSIS — Z51.81 THERAPEUTIC DRUG MONITORING: Primary | ICD-10-CM

## 2021-07-08 RX ORDER — IBUPROFEN 800 MG/1
800 TABLET ORAL EVERY 8 HOURS PRN
Qty: 30 TABLET | Refills: 1 | Status: SHIPPED | OUTPATIENT
Start: 2021-07-08 | End: 2021-09-21

## 2021-07-08 NOTE — TELEPHONE ENCOUNTER
Medication:   Requested Prescriptions     Pending Prescriptions Disp Refills    ibuprofen (ADVIL;MOTRIN) 800 MG tablet 30 tablet 1     Sig: Take 1 tablet by mouth every 8 hours as needed for Pain (headaches) Take with food        Last Filled:  6/3/21 #30 0RF  Patient Phone Number: [phone] (home) [phone] (work)    Last appt: 6/3/2021   Next appt: 7/14/2021    Last OARRS: No flowsheet data found.    / #30 0RF

## 2021-07-13 ENCOUNTER — APPOINTMENT (OUTPATIENT)
Dept: GENERAL RADIOLOGY | Age: 26
End: 2021-07-13
Payer: COMMERCIAL

## 2021-07-13 ENCOUNTER — HOSPITAL ENCOUNTER (EMERGENCY)
Age: 26
Discharge: HOME | End: 2021-07-13
Payer: COMMERCIAL

## 2021-07-13 VITALS
SYSTOLIC BLOOD PRESSURE: 135 MMHG | BODY MASS INDEX: 35.44 KG/M2 | RESPIRATION RATE: 12 BRPM | DIASTOLIC BLOOD PRESSURE: 82 MMHG | TEMPERATURE: 97.9 F | WEIGHT: 200 LBS | OXYGEN SATURATION: 98 % | HEART RATE: 93 BPM | HEIGHT: 63 IN

## 2021-07-13 DIAGNOSIS — S63.501A SPRAIN OF RIGHT WRIST, INITIAL ENCOUNTER: Primary | ICD-10-CM

## 2021-07-13 PROCEDURE — 99283 EMERGENCY DEPT VISIT LOW MDM: CPT

## 2021-07-13 PROCEDURE — 6370000000 HC RX 637 (ALT 250 FOR IP): Performed by: PHYSICIAN ASSISTANT

## 2021-07-13 PROCEDURE — 73110 X-RAY EXAM OF WRIST: CPT

## 2021-07-13 RX ORDER — IBUPROFEN 600 MG/1
600 TABLET ORAL ONCE
Status: COMPLETED | OUTPATIENT
Start: 2021-07-13 | End: 2021-07-13

## 2021-07-13 RX ADMIN — IBUPROFEN 600 MG: 600 TABLET, FILM COATED ORAL at 17:18

## 2021-07-13 ASSESSMENT — PAIN DESCRIPTION - ORIENTATION: ORIENTATION: RIGHT

## 2021-07-13 ASSESSMENT — ENCOUNTER SYMPTOMS
NAUSEA: 0
VOMITING: 0

## 2021-07-13 ASSESSMENT — PAIN SCALES - GENERAL
PAINLEVEL_OUTOF10: 7
PAINLEVEL_OUTOF10: 7

## 2021-07-13 ASSESSMENT — PAIN DESCRIPTION - FREQUENCY: FREQUENCY: CONTINUOUS

## 2021-07-13 ASSESSMENT — PAIN DESCRIPTION - LOCATION: LOCATION: ARM

## 2021-07-13 ASSESSMENT — PAIN DESCRIPTION - PAIN TYPE: TYPE: ACUTE PAIN

## 2021-07-13 ASSESSMENT — PAIN DESCRIPTION - DESCRIPTORS: DESCRIPTORS: THROBBING

## 2021-07-13 NOTE — ED PROVIDER NOTES
[facility]        Pt Name: Mike Turner  MRN: [id number]  [place] 1995  Date of evaluation: 7/13/2021  Provider: Faby Wilder PA-C  PCP: MARTA Calvert CNP  Note Started: 5:15 PM EDT       SERGIO. I have evaluated this patient. My supervising physician was available for consultation. CHIEF COMPLAINT       Chief Complaint   Patient presents with    Arm Injury     Right arm injury when walking up stairs pt tripped and cought herself on right hand. Pt has carpel tunnel in right arm and concerned that may have strained r arm. HISTORY OF PRESENT ILLNESS   (Location, Timing/Onset, Context/Setting, Quality, Duration, Modifying Factors, Severity, Associated Signs and Symptoms)  Note limiting factors. Chief Complaint: Right wrist injury    Mike Turner is a 26 y.o. female who presents to the emergency department today for evaluation for a fall which occurred on 5:30 AM this morning. The patient states that she was walking up her steps, she states that she tripped, and she landed on an outstretched hand. The patient denies been dizzy, lightheaded, having chest pain or shortness of breath, her fall was mechanical in nature. She did not hit her head, there is no loss of consciousness or vomiting. The patient is only complaining of pain to her right wrist, and she is rating is a 7/10. She states that her pain is worse with touch, certain movements. She is right-handed. She has no numbness, tingling or weakness. No fever chills. No nausea or vomiting. She states that she does have a history of carpal tunnel in this arm but otherwise denies any previous injury previous surgery. Nursing Notes were all reviewed and agreed with or any disagreements were addressed in the HPI.     REVIEW OF SYSTEMS    (2-9 systems for level 4, 10 or more for level 5)     Review of Systems   Constitutional: Negative for activity change, appetite change and fever. Gastrointestinal: Negative for nausea and vomiting. Musculoskeletal: Positive for arthralgias. Skin: Negative for wound. Neurological: Negative for weakness and numbness. Positives and Pertinent negatives as per HPI. Except as noted above in the ROS, all other systems were reviewed and negative. PAST MEDICAL HISTORY     Past Medical History:   Diagnosis Date    Polycystic ovarian syndrome 12/01/2019         SURGICAL HISTORY   History reviewed. No pertinent surgical history. Gustavo B[facility] MD [facility]       Discharge Medication List as of 7/13/2021  6:56 PM      CONTINUE these medications which have NOT CHANGED    Details   ibuprofen (ADVIL;MOTRIN) 800 MG tablet Take 1 tablet by mouth every 8 hours as needed for Pain (headaches) Take with food, Disp-30 tablet, R-1Normal      metoprolol tartrate (LOPRESSOR) 50 MG tablet Take 1 tablet by mouth 2 times daily, Disp-60 tablet, R-1Normal      SUMAtriptan (IMITREX) 50 MG tablet Take 1 tablet at onset of migraine headache. May take additional tablet one hour if no improvement. Do not take more than 2 tablets in 24 hours. , Disp-12 tablet, R-2Normal      ondansetron (ZOFRAN) 8 MG tablet Take 1 tablet by mouth every 8 hours as needed for Nausea or Vomiting, Disp-20 tablet, R-1Normal      norethindrone-ethinyl estradiol-iron (LOESTRIN FE 1.5/30) 1.5-30 MG-MCG tablet Take 1 tablet by mouth daily, Disp-3 packet, R-3Normal               ALLERGIES     Penicillins and Tylenol [acetaminophen]    FAMILYHISTORY       Family History   Problem Relation Age of Onset    High Blood Pressure Mother     Heart Disease Father     High Blood Pressure Father     High Cholesterol Father     Other Father     Migraines Sister     Diabetes Maternal Grandmother     Diabetes Paternal Grandmother     Hypertension Paternal Grandmother     Diabetes Maternal Grandfather     Hypertension Maternal Grandfather     Cancer Maternal Grandfather Bladder    Lung Cancer Paternal Grandfather           SOCIAL HISTORY       Social History     Tobacco Use    Smoking status: Never Smoker    Smokeless tobacco: Never Used   Vaping Use    Vaping Use: Never used   Substance Use Topics    Alcohol use: Not Currently    Drug use: Never       SCREENINGS             PHYSICAL EXAM    (up to 7 for level 4, 8 or more for level 5)     ED Triage Vitals [07/13/21 1703]   BP Temp Temp Source Pulse Resp SpO2 Height Weight   135/82 97.9 °F (36.6 °C) Oral 93 12 98 % 5' 3\" (1.6 m) 200 lb (90.7 kg)       Physical Exam  Vitals and nursing note reviewed. Constitutional:       Appearance: She is well-developed. She is not diaphoretic. HENT:      Head: Normocephalic and atraumatic. Right Ear: External ear normal.      Left Ear: External ear normal.      Nose: Nose normal.   Eyes:      General:         Right eye: No discharge. Left eye: No discharge. Neck:      Trachea: No tracheal deviation. Cardiovascular:      Pulses: Normal pulses. Pulmonary:      Effort: Pulmonary effort is normal. No respiratory distress. Musculoskeletal:         General: Normal range of motion. Cervical back: Normal range of motion and neck supple. Comments: There is tenderness palpation to the right distal radius and ulna. There is no overlying erythema, edema, ecchymosis or warmth. Radial pulses 2+. Normal sensation light touch cardiovascular intact. Full passive range of motion. No tenderness over the anatomic snuffbox, no pain with axial loading. Skin:     General: Skin is warm and dry. Neurological:      Mental Status: She is alert and oriented to person, place, and time. Psychiatric:         Behavior: Behavior normal.         DIAGNOSTIC RESULTS   LABS:    Labs Reviewed - No data to display    When ordered only abnormal lab results are displayed. All other labs were within normal range or not returned as of this dictation. EKG:  When ordered, EKG's are interpreted by the Emergency Department Physician in the absence of a cardiologist.  Please see their note for interpretation of EKG. RADIOLOGY:   Non-plain film images such as CT, Ultrasound and MRI are read by the radiologist. Plain radiographic images are visualized and preliminarily interpreted by the ED Provider with the below findings:        Interpretation per the Radiologist below, if available at the time of this note:    XR WRIST RIGHT (MIN 3 VIEWS)   Final Result   Negative radiographs of the right wrist.           No results found. PROCEDURES   Unless otherwise noted below, none     Procedures    CRITICAL CARE TIME   N/A    CONSULTS:  None      EMERGENCY DEPARTMENT COURSE and DIFFERENTIAL DIAGNOSIS/MDM:   Vitals:    Vitals:    07/13/21 1703   BP: 135/82   Pulse: 93   Resp: 12   Temp: 97.9 °F (36.6 °C)   TempSrc: Oral   SpO2: 98%   Weight: 200 lb (90.7 kg)   Height: 5' 3\" (1.6 m)       Patient was given the following medications:  Medications   ibuprofen (ADVIL;MOTRIN) tablet 600 mg (600 mg Oral Given 7/13/21 1718)           Briefly, this is a 73-year-old female who presents to the emergency department today for evaluation for a right wrist injury which occurred early this morning while she was walking and tripped up the steps and landed on an outstretched wrist.    On examination, she has tenderness over her right distal radius and ulna she is neurovascular intact, x-ray    Is negative for any acute process    I feel that clinically the symptoms today are likely due to contusion or possible sprain/strain. She will be given an Ace wrap in the ED, and was recommended that she follow with her pain care physician within 1 week if there is no improvement. Supportive care discussed at home otherwise. She is to return to the ED for any new or worsening symptoms, patient voiced understanding is agreeable with plan.   Stable for discharge    I estimate there is LOW risk for COMPARTMENT SYNDROME, DEEP VENOUS THROMBOSIS, SEPTIC ARTHRITIS, TENDON OR NEUROVASCULAR INJURY, thus I consider the discharge disposition reasonable. Kamila Arroyo and I have discussed the diagnosis and risks, and we agree with discharging home to follow-up with their primary doctor or the referral orthopedist. We also discussed returning to the Emergency Department immediately if new or worsening symptoms occur. We have discussed the symptoms which are most concerning (e.g., changing or worsening pain, numbness, weakness) that necessitate immediate return. FINAL IMPRESSION      1.  Sprain of right wrist, initial encounter          DISPOSITION/PLAN   DISPOSITION Decision To Discharge 07/13/2021 06:55:42 PM      PATIENT REFERRED TO:  MARTA Smith CNP  [address] [place] [address]. [address]  [phone]    Schedule an appointment as soon as possible for a visit in 1 week      [facility] Emergency Department  [address] [place] [address]  [phone]    As needed, If symptoms worsen      DISCHARGE MEDICATIONS:  Discharge Medication List as of 7/13/2021  6:56 PM          DISCONTINUED MEDICATIONS:  Discharge Medication List as of 7/13/2021  6:56 PM                 (Please note that portions of this note were completed with a voice recognition program.  Efforts were made to edit the dictations but occasionally words are mis-transcribed.)    Jessee Mckeon PA-C (electronically signed)           Jessee Mckeon PA-C  07/13/21 2049

## 2021-07-19 ENCOUNTER — OFFICE VISIT (OUTPATIENT)
Dept: PRIMARY CARE CLINIC | Age: 26
End: 2021-07-19
Payer: COMMERCIAL

## 2021-07-19 VITALS
SYSTOLIC BLOOD PRESSURE: 122 MMHG | HEIGHT: 63 IN | OXYGEN SATURATION: 95 % | WEIGHT: 199 LBS | HEART RATE: 93 BPM | BODY MASS INDEX: 35.26 KG/M2 | DIASTOLIC BLOOD PRESSURE: 86 MMHG

## 2021-07-19 DIAGNOSIS — M25.531 RIGHT WRIST PAIN: ICD-10-CM

## 2021-07-19 DIAGNOSIS — G43.109 MIGRAINE WITH AURA AND WITHOUT STATUS MIGRAINOSUS, NOT INTRACTABLE: Primary | ICD-10-CM

## 2021-07-19 PROCEDURE — 99214 OFFICE O/P EST MOD 30 MIN: CPT | Performed by: NURSE PRACTITIONER

## 2021-07-19 RX ORDER — METOPROLOL TARTRATE 50 MG/1
50 TABLET, FILM COATED ORAL 2 TIMES DAILY
Qty: 60 TABLET | Refills: 2 | Status: SHIPPED | OUTPATIENT
Start: 2021-07-19 | End: 2021-11-01 | Stop reason: SDUPTHER

## 2021-07-19 ASSESSMENT — ENCOUNTER SYMPTOMS: RESPIRATORY NEGATIVE: 1

## 2021-07-19 NOTE — PATIENT INSTRUCTIONS
Patient Education        Wrist Sprain: Care Instructions  Your Care Instructions     Your wrist hurts because you have stretched or torn ligaments, which connect the bones in your wrist.  Wrist sprains usually take from 2 to 10 weeks to heal, but some take longer. Usually, the more pain you have, the more severe your wrist sprain is and the longer it will take to heal. You can heal faster and regain strength in your wrist with good home treatment. Follow-up care is a key part of your treatment and safety. Be sure to make and go to all appointments, and call your doctor if you are having problems. It's also a good idea to know your test results and keep a list of the medicines you take. How can you care for yourself at home? · Prop up your arm on a pillow when you ice it or anytime you sit or lie down for the next 3 days. Try to keep your wrist above the level of your heart. This will help reduce swelling. · Put ice or cold packs on your wrist for 10 to 20 minutes at a time. Try to do this every 1 to 2 hours for the next 3 days (when you are awake) or until the swelling goes down. Put a thin cloth between the ice pack and your skin. · After 2 or 3 days, if your swelling is gone, apply a heating pad set on low or a warm cloth to your wrist. This helps keep your wrist flexible. Some doctors suggest that you go back and forth between hot and cold. · If you have an elastic bandage, keep it on for the next 24 to 36 hours. The bandage should be snug but not so tight that it causes numbness or tingling. To rewrap the wrist, wrap the bandage around the hand a few times, beginning at the fingers. Then wrap it around the hand between the thumb and index finger, ending by circling the wrist several times. · If your doctor gave you a splint or brace, wear it as directed to protect your wrist until it has healed. · Take pain medicines exactly as directed.   ? If the doctor gave you a prescription medicine for pain, take it as prescribed. ? If you are not taking a prescription pain medicine, ask your doctor if you can take an over-the-counter medicine. · Try not to use your injured wrist and hand. When should you call for help? Call your doctor now or seek immediate medical care if:    · Your hand or fingers are cool or pale or change color. Watch closely for changes in your health, and be sure to contact your doctor if:    · Your pain gets worse.     · Your wrist has not improved after 1 week. Where can you learn more? Go to https://ZeroMailpepiceweb.[employer]. org and sign in to your [employer] account. Enter Q639 in the [place] box to learn more about \"Wrist Sprain: Care Instructions. \"     If you do not have an account, please click on the \"Sign Up Now\" link. Current as of: November 16, 2020               Content Version: 12.9  © 2006-2021 [employer]. Care instructions adapted under license by [facility] ([facility]). If you have questions about a medical condition or this instruction, always ask your healthcare professional. [place] [address] any warranty or liability for your use of this information. Patient Education        Migraine Headache: Care Instructions  Overview     Migraines are painful, throbbing headaches that often start on one side of the head. They may cause nausea and vomiting and make you sensitive to light, sound, or smell. Without treatment, migraines can last from 4 hours to a few days. Medicines can help prevent migraines or stop them after they have started. Your doctor can help you find which ones work best for you. Follow-up care is a key part of your treatment and safety. Be sure to make and go to all appointments, and call your doctor if you are having problems. It's also a good idea to know your test results and keep a list of the medicines you take. How can you care for yourself at home?   · Do not drive if you have taken a prescription pain medicine. · Rest in a quiet, dark room until your headache is gone. Close your eyes, and try to relax or go to sleep. Don't watch TV or read. · Put a cold, moist cloth or cold pack on the painful area for 10 to 20 minutes at a time. Put a thin cloth between the cold pack and your skin. · Use a warm, moist towel or a heating pad set on low to relax tight shoulder and neck muscles. · Have someone gently massage your neck and shoulders. · Take your medicines exactly as prescribed. Call your doctor if you think you are having a problem with your medicine. You will get more details on the specific medicines your doctor prescribes. · Don't take medicine for headache pain too often. Talk to your doctor if you are taking medicine more than 2 days a week to stop a headache. Taking too much pain medicine can lead to more headaches. These are called medicine-overuse headaches. To prevent migraines  · Keep a headache diary so you can figure out what triggers your headaches. Avoiding triggers may help you prevent headaches. Record when each headache began, how long it lasted, and what the pain was like. Write down any other symptoms you had with the headache, such as nausea, flashing lights or dark spots, or sensitivity to bright light or loud noise. Note if the headache occurred near your period. List anything that might have triggered the headache. Triggers may include certain foods (chocolate, cheese, wine) or odors, smoke, bright light, stress, or lack of sleep. · If your doctor has prescribed medicine for your migraines, take it as directed. You may have medicine that you take only when you get a migraine and medicine that you take all the time to help prevent migraines. ? If your doctor has prescribed medicine for when you get a headache, take it at the first sign of a migraine, unless your doctor has given you other instructions.   ? If your doctor has prescribed medicine to prevent migraines, take it exactly as prescribed. Call your doctor if you think you are having a problem with your medicine. · Find healthy ways to deal with stress. Migraines are most common during or right after stressful times. Try finding ways to reduce stress like practicing mindfulness or deep breathing exercises. · Get plenty of sleep and exercise. But be careful to not push yourself too hard during exercise. It may trigger a headache. · Eat meals on a regular schedule. Avoid foods and drinks that often trigger migraines. These include chocolate, alcohol (especially red wine and port), aspartame, monosodium glutamate (MSG), and some additives found in foods (such as hot dogs, gilbert, cold cuts, aged cheeses, and pickled foods). · Limit caffeine. Don't drink too much coffee, tea, or soda. But don't quit caffeine suddenly. That can also give you migraines. · Do not smoke or allow others to smoke around you. If you need help quitting, talk to your doctor about stop-smoking programs and medicines. These can increase your chances of quitting for good. · If you are taking birth control pills or hormone therapy, talk to your doctor about whether they are triggering your migraines. When should you call for help? Call 911 anytime you think you may need emergency care. For example, call if:    · You have signs of a stroke. These may include:  ? Sudden numbness, paralysis, or weakness in your face, arm, or leg, especially on only one side of your body. ? Sudden vision changes. ? Sudden trouble speaking. ? Sudden confusion or trouble understanding simple statements. ? Sudden problems with walking or balance. ? A sudden, severe headache that is different from past headaches. Call your doctor now or seek immediate medical care if:    · You have new or worse nausea and vomiting.     · You have a new or higher fever.     · Your headache gets much worse.    Watch closely for changes in your health, and be sure to contact your doctor if:    · You are not getting better after 2 days (48 hours). Where can you learn more? Go to https://chpepiceweb.[employer]. org and sign in to your Inline.me account. Enter H407 in the [facility] box to learn more about \"Migraine Headache: Care Instructions. \"     If you do not have an account, please click on the \"Sign Up Now\" link. Current as of: August 4, 2020               Content Version: 12.9  © 2006-2021 [employer]. Care instructions adapted under license by [facility] ([facility]). If you have questions about a medical condition or this instruction, always ask your healthcare professional. [address] any warranty or liability for your use of this information.

## 2021-07-19 NOTE — PROGRESS NOTES
Negative. Cardiovascular: Negative. Musculoskeletal:        Right wrist pain   Neurological: Positive for weakness (right wrist) and headaches (frequency decreasing). Negative for dizziness, tremors and light-headedness. Psychiatric/Behavioral: Negative. Current Outpatient Medications on File Prior to Visit   Medication Sig Dispense Refill    SUMAtriptan (IMITREX) 50 MG tablet Take 1 tablet at onset of migraine headache. May take additional tablet one hour if no improvement. Do not take more than 2 tablets in 24 hours. 12 tablet 2    ibuprofen (ADVIL;MOTRIN) 800 MG tablet Take 1 tablet by mouth every 8 hours as needed for Pain (headaches) Take with food 30 tablet 1    metoprolol tartrate (LOPRESSOR) 50 MG tablet Take 1 tablet by mouth 2 times daily 60 tablet 1    ondansetron (ZOFRAN) 8 MG tablet Take 1 tablet by mouth every 8 hours as needed for Nausea or Vomiting (Patient not taking: Reported on 7/19/2021) 20 tablet 1    norethindrone-ethinyl estradiol-iron (LOESTRIN FE 1.5/30) 1.5-30 MG-MCG tablet Take 1 tablet by mouth daily 3 packet 3     No current facility-administered medications on file prior to visit. Allergies   Allergen Reactions    Penicillins Shortness Of Breath    Tylenol [Acetaminophen] Hives     Past Medical History:   Diagnosis Date    Polycystic ovarian syndrome 12/01/2019     History reviewed. No pertinent surgical history.    Social History     Tobacco Use    Smoking status: Never Smoker    Smokeless tobacco: Never Used   Substance Use Topics    Alcohol use: Not Currently      Family History   Problem Relation Age of Onset    High Blood Pressure Mother     Heart Disease Father     High Blood Pressure Father     High Cholesterol Father     Other Father     Migraines Sister     Diabetes Maternal Grandmother     Diabetes Paternal Grandmother     Hypertension Paternal Grandmother     Diabetes Maternal Grandfather     Hypertension Maternal Grandfather     Cancer Maternal Grandfather         Bladder    Lung Cancer Paternal Grandfather         Vitals:    07/19/21 1540   BP: 122/86   Site: Left Upper Arm   Position: Sitting   Cuff Size: Medium Adult   Pulse: 93   SpO2: 95%   Weight: 199 lb (90.3 kg)   Height: 5' 3\" (1.6 m)     Estimated body mass index is 35.25 kg/m² as calculated from the following:    Height as of this encounter: 5' 3\" (1.6 m). Weight as of this encounter: 199 lb (90.3 kg). Physical Exam  Vitals and nursing note reviewed. Constitutional:       Appearance: Normal appearance. Neck:      Vascular: No carotid bruit. Cardiovascular:      Rate and Rhythm: Normal rate and regular rhythm. Pulses: Normal pulses. Heart sounds: Normal heart sounds. Pulmonary:      Effort: Pulmonary effort is normal.      Breath sounds: Normal breath sounds. Musculoskeletal:      Right wrist: Tenderness present. No swelling or deformity. Normal range of motion. Normal pulse. Left wrist: Normal.      Right hand: Normal.      Left hand: Normal.      Cervical back: Normal range of motion and neck supple. Lymphadenopathy:      Cervical: No cervical adenopathy. Skin:     General: Skin is warm. Neurological:      General: No focal deficit present. Mental Status: She is alert and oriented to person, place, and time. Psychiatric:         Mood and Affect: Mood normal.         Behavior: Behavior normal.         ASSESSMENT/PLAN:  1. Migraine with aura and without status migrainosus, not intractable  - Improving  - Continue metoprolol tartrate (LOPRESSOR) 50 MG tablet; Take 1 tablet by mouth 2 times daily  Dispense: 60 tablet; Refill: 2  Note: Metoprolol Max daily dose 200 mg (divided into two doses). - Continue Motrin 800 mg every 8 hours as needed for headaches. - Continue Imitrex 50 mg at onset of headache, repeat dose in 1 hour if no improvement.   - Set alarm on phone in morning and evening to remind to take Metoprolol twice daily- patient verbalizes understanding.  - Purchase pill organzier    2. Right wrist pain  - Improving.  - Continue to ice a couple times daily. - Continue to wear wrist splint  - Follow up if pain persists or worsens. Return in about 3 months (around 10/19/2021) for migraine headache follow up or sooner if no improvement of right wrist pain. Discussed use, benefit, and side effects of prescribed medications. Patient's questions answered and concerns addressed. Patient agrees to plan of care. My scheduled days in the office reviewed with patient, and same day appointments available. Encouraged to use [employer] for communication as needed. Electronically signed by MARTA Davis CNP on 7/19/2021 at 4:14 PM       This dictation was generated by voice recognition computer software. Although all attempts are made to edit the dictation for accuracy, there may be errors in the transcription that are not intended.

## 2021-08-26 DIAGNOSIS — G43.109 MIGRAINE WITH AURA AND WITHOUT STATUS MIGRAINOSUS, NOT INTRACTABLE: ICD-10-CM

## 2021-08-26 RX ORDER — IBUPROFEN 800 MG/1
800 TABLET ORAL EVERY 8 HOURS PRN
Qty: 30 TABLET | Refills: 1 | OUTPATIENT
Start: 2021-08-26 | End: 2021-09-05

## 2021-09-21 ENCOUNTER — APPOINTMENT (OUTPATIENT)
Dept: GENERAL RADIOLOGY | Age: 26
End: 2021-09-21
Payer: COMMERCIAL

## 2021-09-21 ENCOUNTER — HOSPITAL ENCOUNTER (EMERGENCY)
Age: 26
Discharge: HOME | End: 2021-09-21
Payer: COMMERCIAL

## 2021-09-21 VITALS
TEMPERATURE: 98 F | HEART RATE: 92 BPM | SYSTOLIC BLOOD PRESSURE: 126 MMHG | HEIGHT: 63 IN | OXYGEN SATURATION: 97 % | BODY MASS INDEX: 34.38 KG/M2 | WEIGHT: 194 LBS | RESPIRATION RATE: 14 BRPM | DIASTOLIC BLOOD PRESSURE: 83 MMHG

## 2021-09-21 DIAGNOSIS — G56.01 CARPAL TUNNEL SYNDROME OF RIGHT WRIST: Primary | ICD-10-CM

## 2021-09-21 PROCEDURE — 99283 EMERGENCY DEPT VISIT LOW MDM: CPT

## 2021-09-21 PROCEDURE — 73110 X-RAY EXAM OF WRIST: CPT

## 2021-09-21 PROCEDURE — 6370000000 HC RX 637 (ALT 250 FOR IP): Performed by: NURSE PRACTITIONER

## 2021-09-21 RX ORDER — IBUPROFEN 600 MG/1
600 TABLET ORAL EVERY 6 HOURS PRN
Qty: 30 TABLET | Refills: 0 | Status: SHIPPED | OUTPATIENT
Start: 2021-09-21 | End: 2022-08-01

## 2021-09-21 RX ORDER — IBUPROFEN 600 MG/1
600 TABLET ORAL ONCE
Status: COMPLETED | OUTPATIENT
Start: 2021-09-21 | End: 2021-09-21

## 2021-09-21 RX ADMIN — IBUPROFEN 600 MG: 600 TABLET, FILM COATED ORAL at 19:20

## 2021-09-21 ASSESSMENT — PAIN SCALES - GENERAL
PAINLEVEL_OUTOF10: 7
PAINLEVEL_OUTOF10: 7

## 2021-09-21 ASSESSMENT — ENCOUNTER SYMPTOMS
ABDOMINAL PAIN: 0
DIARRHEA: 0
CHEST TIGHTNESS: 0
NAUSEA: 0
VOMITING: 0
SHORTNESS OF BREATH: 0

## 2021-09-21 ASSESSMENT — PAIN DESCRIPTION - PAIN TYPE: TYPE: ACUTE PAIN

## 2021-09-21 NOTE — ED PROVIDER NOTES
[facility]        Pt Name: Nyasia Vazquez  MRN: [id number]  [place] 1995  Date of evaluation: 9/21/2021  Provider: MARTA Bass CNP  PCP: MARTA Mccloud CNP  Note Started: 7:05 PM EDT       SERGIO. I have evaluated this patient. My supervising physician was available for consultation. CHIEF COMPLAINT       Chief Complaint   Patient presents with    Hand Pain     Pt to ER from home for complaint of severe R sided wrist pain that radiates into elbow. Pt reports hx of carpal tunnel, and thinks this has become more severe. Tried ibuprofen at home with little relief. No injury or obvious deformity.  Arm Pain       HISTORY OF PRESENT ILLNESS   (Location, Timing/Onset, Context/Setting, Quality, Duration, Modifying Factors, Severity, Associated Signs and Symptoms)  Note limiting factors. Chief Complaint: right wrist and thumb pain     Nyasia Vazquez is a 23 y.o. female who presents to the ER with complaint of right wrist and thumb pain. She reports onset of symptoms about a week ago but worse over the past 1 day. Reports history of carpal tunnel that has not been repaired. She has not seen orthopedist specialist yet for this problem. States that the pain is now moving from the right wrist into the right thumb, worse with any movement. Especially worse with flexion and extension of the wrist.  Describes her pain as throbbing, rates a 6 of 10. No injury noted or reported. She does work in a . Denies any headache, fever, lightheadedness, dizziness, visual disturbances. No chest pain or pressure. No neck or back pain. No shortness of breath, cough, or congestion. No abdominal pain, nausea, vomiting, diarrhea, constipation, or dysuria. No rash. Nursing Notes were all reviewed and agreed with or any disagreements were addressed in the HPI.     REVIEW OF SYSTEMS    (2-9 systems for level 4, 10 or more for level 5)     Review of Systems   Constitutional: Negative for activity change, chills and fever. Respiratory: Negative for chest tightness and shortness of breath. Cardiovascular: Negative for chest pain. Gastrointestinal: Negative for abdominal pain, diarrhea, nausea and vomiting. Genitourinary: Negative for dysuria. Musculoskeletal:        Right wrist and right thumb pain   All other systems reviewed and are negative. Positives and Pertinent negatives as per HPI. Except as noted above in the ROS, all other systems were reviewed and negative. PAST MEDICAL HISTORY     Past Medical History:   Diagnosis Date    Polycystic ovarian syndrome 12/01/2019         SURGICAL HISTORY   History reviewed. No pertinent surgical history. CURRENTMEDICATIONS       Previous Medications    METOPROLOL TARTRATE (LOPRESSOR) 50 MG TABLET    Take 1 tablet by mouth 2 times daily    NORETHINDRONE-ETHINYL ESTRADIOL-IRON (LOESTRIN FE 1.5/30) 1.5-30 MG-MCG TABLET    Take 1 tablet by mouth daily    OMEPRAZOLE PO    Take by mouth daily    ONDANSETRON (ZOFRAN) 8 MG TABLET    Take 1 tablet by mouth every 8 hours as needed for Nausea or Vomiting    SUMATRIPTAN (IMITREX) 50 MG TABLET    Take 1 tablet at onset of migraine headache. May take additional tablet one hour if no improvement. Do not take more than 2 tablets in 24 hours.          ALLERGIES     Penicillins and Tylenol [acetaminophen]    FAMILYHISTORY       Family History   Problem Relation Age of Onset    High Blood Pressure Mother     Heart Disease Father     High Blood Pressure Father     High Cholesterol Father     Other Father     Migraines Sister     Diabetes Maternal Grandmother     Diabetes Paternal Grandmother     Hypertension Paternal Grandmother     Diabetes Maternal Grandfather     Hypertension Maternal Grandfather     Cancer Maternal Grandfather         Bladder    Lung Cancer Paternal Grandfather           SOCIAL HISTORY Social History     Tobacco Use    Smoking status: Never Smoker    Smokeless tobacco: Never Used   Vaping Use    Vaping Use: Never used   Substance Use Topics    Alcohol use: Not Currently    Drug use: Never       SCREENINGS             PHYSICAL EXAM    (up to 7 for level 4, 8 or more for level 5)     ED Triage Vitals   BP Temp Temp src Pulse Resp SpO2 Height Weight   -- -- -- -- -- -- -- --       Physical Exam  Vitals and nursing note reviewed. Constitutional:       Appearance: She is well-developed. She is not diaphoretic. HENT:      Head: Normocephalic and atraumatic. Right Ear: External ear normal.      Left Ear: External ear normal.   Eyes:      General:         Right eye: No discharge. Left eye: No discharge. Neck:      Vascular: No JVD. Cardiovascular:      Rate and Rhythm: Normal rate and regular rhythm. Pulses: Normal pulses. Heart sounds: Normal heart sounds. Pulmonary:      Effort: Pulmonary effort is normal. No respiratory distress. Breath sounds: Normal breath sounds. Abdominal:      Palpations: Abdomen is soft. Musculoskeletal:         General: Normal range of motion. Right wrist: Tenderness present. No swelling. Cervical back: Normal range of motion and neck supple. Comments: Positive Tinel, positive Phalen sign. Skin:     General: Skin is warm and dry. Coloration: Skin is not pale. Neurological:      Mental Status: She is alert and oriented to person, place, and time. Psychiatric:         Behavior: Behavior normal.         DIAGNOSTIC RESULTS   LABS:    Labs Reviewed - No data to display    When ordered only abnormal lab results are displayed. All other labs were within normal range or not returned as of this dictation. EKG: When ordered, EKG's are interpreted by the Emergency Department Physician in the absence of a cardiologist.  Please see their note for interpretation of EKG.     RADIOLOGY:   Non-plain film images such as CT, Ultrasound and MRI are read by the radiologist. Plain radiographic images are visualized and preliminarily interpreted by the ED Provider with the below findings:        Interpretation per the Radiologist below, if available at the time of this note:    XR WRIST RIGHT (MIN 3 VIEWS)   Final Result   Normal wrist radiographs           No results found. PROCEDURES   Unless otherwise noted below, none     Procedures    CRITICAL CARE TIME   N/A    CONSULTS:  None      EMERGENCY DEPARTMENT COURSE and DIFFERENTIAL DIAGNOSIS/MDM:   Vitals:    Vitals:    09/21/21 1910   BP: 126/83   Pulse: 92   Resp: 14   Temp: 98 °F (36.7 °C)   TempSrc: Oral   SpO2: 97%   Weight: 194 lb (88 kg)   Height: 5' 3\" (1.6 m)       Patient was given the following medications:  Medications   ibuprofen (ADVIL;MOTRIN) tablet 600 mg (600 mg Oral Given 9/21/21 1920)           Briefly, this is a 23year old female that presents to the emergency department complaining of right wrist pain. Probable carpal tunnel. She is also complaining of pain with Rosio Neas test, she will be placed in a Velcro thumb spica that will be used as protection until seen by ortho- referral provided. Ibuprofen given for pain. I estimate there is LOW risk for FRACTURE, COMPARTMENT SYNDROME, DEEP VENOUS THROMBOSIS, SEPTIC ARTHRITIS, TENDON OR NEUROVASCULAR INJURY, thus I consider the discharge disposition reasonable. FINAL IMPRESSION      1.  Carpal tunnel syndrome of right wrist          DISPOSITION/PLAN   DISPOSITION Decision To Discharge 09/21/2021 07:28:56 PM      PATIENT REFERRED TO:  Jessica Lainez MD  [address] [place] [address]  [address] [place] [address]  [phone]    Schedule an appointment as soon as possible for a visit         DISCHARGE MEDICATIONS:  New Prescriptions    IBUPROFEN (ADVIL;MOTRIN) 600 MG TABLET    Take 1 tablet by mouth every 6 hours as needed for Pain       DISCONTINUED MEDICATIONS:  Discontinued Medications    IBUPROFEN (ADVIL;MOTRIN) 800 MG TABLET    Take 1 tablet by mouth every 8 hours as needed for Pain (headaches) Take with food              (Please note that portions of this note were completed with a voice recognition program.  Efforts were made to edit the dictations but occasionally words are mis-transcribed.)    MARTA Sandoval CNP (electronically signed)           MARTA Sandoval CNP  09/21/21 1946

## 2021-09-22 DIAGNOSIS — M25.531 RIGHT WRIST PAIN: Primary | ICD-10-CM

## 2021-09-29 ENCOUNTER — OFFICE VISIT (OUTPATIENT)
Dept: ORTHOPEDIC SURGERY | Age: 26
End: 2021-09-29
Payer: COMMERCIAL

## 2021-09-29 VITALS — WEIGHT: 194 LBS | HEIGHT: 63 IN | BODY MASS INDEX: 34.38 KG/M2

## 2021-09-29 DIAGNOSIS — M25.531 RIGHT WRIST PAIN: Primary | ICD-10-CM

## 2021-09-29 PROCEDURE — 99203 OFFICE O/P NEW LOW 30 MIN: CPT | Performed by: ORTHOPAEDIC SURGERY

## 2021-09-29 NOTE — PROGRESS NOTES
CHIEF COMPLAINT: Right wrist pain. HISTORY:  Ms. Dominik Mosqueda is 23 y.o.  female right handed presents today for consultation request from MARTA Nguyen CNP for evaluation of a right wrist pain with no known injury which started Feb 2021  She is complaining of right wrist volar radial pain. This is better with elevation and worse with ROM. The pain is sharp and not radiating. No other complaint. She had EMG 5/21/2021 and that was normal.    Past Medical History:   Diagnosis Date    Polycystic ovarian syndrome 12/01/2019       History reviewed. No pertinent surgical history. Social History     Socioeconomic History    Marital status: Single     Spouse name: Not on file    Number of children: Not on file    Years of education: Not on file    Highest education level: Not on file   Occupational History    Not on file   Tobacco Use    Smoking status: Never Smoker    Smokeless tobacco: Never Used   Vaping Use    Vaping Use: Never used   Substance and Sexual Activity    Alcohol use: Not Currently    Drug use: Never    Sexual activity: Yes     Partners: Male   Other Topics Concern    Not on file   Social History Narrative    Not on file     Social Determinants of Health     Financial Resource Strain: Low Risk     Difficulty of Paying Living Expenses: Not hard at all   Food Insecurity: No Food Insecurity    Worried About Running Out of Food in the Last Year: Never true    [place] of Food in the Last Year: Never true   Transportation Needs: No Transportation Needs    Lack of Transportation (Medical): No    Lack of Transportation (Non-Medical):  No   Physical Activity:     Days of Exercise per Week:     Minutes of Exercise per Session:    Stress:     Feeling of Stress :    Social Connections:     Frequency of Communication with Friends and Family:     Frequency of Social Gatherings with Friends and Family:     Attends Restorationist Services:     Active Member of Clubs or Organizations:  Attends Club or Organization Meetings:     Marital Status:    Intimate Partner Violence:     Fear of Current or Ex-Partner:     Emotionally Abused:     Physically Abused:     Sexually Abused:        Family History   Problem Relation Age of Onset    High Blood Pressure Mother     Heart Disease Father     High Blood Pressure Father     High Cholesterol Father     Other Father     Migraines Sister     Diabetes Maternal Grandmother     Diabetes Paternal Grandmother     Hypertension Paternal Grandmother     Diabetes Maternal Grandfather     Hypertension Maternal Grandfather     Cancer Maternal Grandfather         Bladder    Lung Cancer Paternal Grandfather        Current Outpatient Medications on File Prior to Visit   Medication Sig Dispense Refill    OMEPRAZOLE PO Take by mouth daily      ibuprofen (ADVIL;MOTRIN) 600 MG tablet Take 1 tablet by mouth every 6 hours as needed for Pain 30 tablet 0    metoprolol tartrate (LOPRESSOR) 50 MG tablet Take 1 tablet by mouth 2 times daily 60 tablet 2    SUMAtriptan (IMITREX) 50 MG tablet Take 1 tablet at onset of migraine headache. May take additional tablet one hour if no improvement. Do not take more than 2 tablets in 24 hours. 12 tablet 2    ondansetron (ZOFRAN) 8 MG tablet Take 1 tablet by mouth every 8 hours as needed for Nausea or Vomiting (Patient not taking: Reported on 7/19/2021) 20 tablet 1    norethindrone-ethinyl estradiol-iron (LOESTRIN FE 1.5/30) 1.5-30 MG-MCG tablet Take 1 tablet by mouth daily 3 packet 3     No current facility-administered medications on file prior to visit. Pertinent items are noted in HPI  Review of systems reviewed from Patient History Form dated on 9/29/2021 and available in the patient's chart under the Media tab. No change noted. PHYSICAL EXAMINATION:  Ms. Grant Shepard is a very pleasant 23 y.o.  female who presents today in no acute distress, awake, alert, and oriented.   She is well dressed, nourished and  groomed. Patient with normal affect. Height is  5' 3\" (1.6 m), weight is 194 lb (88 kg), Body mass index is 34.37 kg/m². Resting respiratory rate is 16. Examination of the gait, showed that the patient walks with no limp. Examination of both Upper extremities showing a normal range of motion of the right hand compare to the other side. There is no swelling that can be seen. She has intact sensation and good radial pulses, minimal tenderness on deep palpation over the volar aspect of the right wrist, and has a good hand  strength. IMAGING: Juan Antonio Jerome were reviewed, taken 9/21/2021, 3 views of the right wrist, and showed no fracture, no other abnormalities. IMPRESSION: Right wrist sprain. PLAN:  I assured the patient that the xray is negative for acute fracture. We recommend a wrist brace. ROM and wrist exercises. Since she is continuing to have significant symptoms, we will obtain an MRI of the right wrist in order to further evaluate her pain. F/U after MRI.       Tasha Ascencio MD

## 2021-10-19 ENCOUNTER — TELEPHONE (OUTPATIENT)
Dept: ORTHOPEDIC SURGERY | Age: 26
End: 2021-10-19

## 2021-10-29 DIAGNOSIS — G43.109 MIGRAINE WITH AURA AND WITHOUT STATUS MIGRAINOSUS, NOT INTRACTABLE: ICD-10-CM

## 2021-10-29 NOTE — TELEPHONE ENCOUNTER
Medication:   Requested Prescriptions     Pending Prescriptions Disp Refills    metoprolol tartrate (LOPRESSOR) 50 MG tablet 60 tablet 2     Sig: Take 1 tablet by mouth 2 times daily       Last Filled:  7/19/21 #60 2rf    Patient Phone Number: [phone] (home) [phone] (work)    Last appt: 7/19/2021   Next appt: Visit date not found    Lab Results   Component Value Date     04/20/2021    K 4.1 04/20/2021     04/20/2021    CO2 26 04/20/2021    BUN 10 04/20/2021    CREATININE 0.8 04/20/2021    GLUCOSE 99 04/20/2021    CALCIUM 9.1 04/20/2021    LABGLOM >60 04/20/2021    GFRAA >60 04/20/2021

## 2021-11-01 RX ORDER — METOPROLOL TARTRATE 50 MG/1
50 TABLET, FILM COATED ORAL 2 TIMES DAILY
Qty: 180 TABLET | Refills: 0 | Status: SHIPPED | OUTPATIENT
Start: 2021-11-01 | End: 2021-11-19 | Stop reason: SDUPTHER

## 2021-11-19 ENCOUNTER — TELEPHONE (OUTPATIENT)
Dept: ORTHOPEDIC SURGERY | Age: 26
End: 2021-11-19

## 2021-11-19 ENCOUNTER — OFFICE VISIT (OUTPATIENT)
Dept: PRIMARY CARE CLINIC | Age: 26
End: 2021-11-19
Payer: COMMERCIAL

## 2021-11-19 VITALS
SYSTOLIC BLOOD PRESSURE: 132 MMHG | HEART RATE: 117 BPM | DIASTOLIC BLOOD PRESSURE: 80 MMHG | WEIGHT: 195 LBS | OXYGEN SATURATION: 98 % | BODY MASS INDEX: 34.55 KG/M2 | HEIGHT: 63 IN

## 2021-11-19 DIAGNOSIS — Z23 NEED FOR INFLUENZA VACCINATION: ICD-10-CM

## 2021-11-19 DIAGNOSIS — G43.109 MIGRAINE WITH AURA AND WITHOUT STATUS MIGRAINOSUS, NOT INTRACTABLE: Primary | ICD-10-CM

## 2021-11-19 DIAGNOSIS — K21.9 GASTROESOPHAGEAL REFLUX DISEASE WITHOUT ESOPHAGITIS: ICD-10-CM

## 2021-11-19 DIAGNOSIS — F41.9 ANXIETY: ICD-10-CM

## 2021-11-19 PROCEDURE — 99214 OFFICE O/P EST MOD 30 MIN: CPT | Performed by: NURSE PRACTITIONER

## 2021-11-19 PROCEDURE — 90674 CCIIV4 VAC NO PRSV 0.5 ML IM: CPT | Performed by: NURSE PRACTITIONER

## 2021-11-19 PROCEDURE — 90471 IMMUNIZATION ADMIN: CPT | Performed by: NURSE PRACTITIONER

## 2021-11-19 RX ORDER — METOPROLOL TARTRATE 50 MG/1
50 TABLET, FILM COATED ORAL 2 TIMES DAILY
Qty: 180 TABLET | Refills: 1 | Status: SHIPPED | OUTPATIENT
Start: 2021-11-19 | End: 2022-03-10 | Stop reason: SDUPTHER

## 2021-11-19 RX ORDER — SERTRALINE HYDROCHLORIDE 25 MG/1
TABLET, FILM COATED ORAL
Qty: 26 TABLET | Refills: 1 | Status: SHIPPED | OUTPATIENT
Start: 2021-11-19 | End: 2021-12-17

## 2021-11-19 RX ORDER — OMEPRAZOLE 40 MG/1
40 CAPSULE, DELAYED RELEASE ORAL EVERY MORNING
Qty: 90 CAPSULE | Refills: 1 | Status: SHIPPED | OUTPATIENT
Start: 2021-11-19 | End: 2022-03-10 | Stop reason: ALTCHOICE

## 2021-11-19 ASSESSMENT — ENCOUNTER SYMPTOMS
NAUSEA: 0
VOMITING: 0
PHOTOPHOBIA: 0
CHEST TIGHTNESS: 0
SHORTNESS OF BREATH: 0

## 2021-11-19 NOTE — TELEPHONE ENCOUNTER
Cell phone number not valid. Someone other than the patient answered home phone. Asked this person to have Maryjane return a call to Dr Shravan Carreon office. Upon return call, make sure the patient knows her MRI is approved for FF and she can call to schedule the appointment.

## 2021-11-19 NOTE — PATIENT INSTRUCTIONS
Patient Education        Influenza (Flu) Vaccine (Inactivated or Recombinant): What You Need to Know  Why get vaccinated? Influenza vaccine can prevent influenza (flu). Flu is a contagious disease that spreads around the [place] every year, usually between October and May. Anyone can get the flu, but it is more dangerous for some people. Infants and young children, people 72years of age and older, pregnant women, and people with certain health conditions or a weakened immune system are at greatest risk of flu complications. Pneumonia, bronchitis, sinus infections and ear infections are examples of flu-related complications. If you have a medical condition, such as heart disease, cancer or diabetes, flu can make it worse. Flu can cause fever and chills, sore throat, muscle aches, fatigue, cough, headache, and runny or stuffy nose. Some people may have vomiting and diarrhea, though this is more common in children than adults. Each year, thousands of people in the [facility] die from flu, and many more are hospitalized. Flu vaccine prevents millions of illnesses and flu-related visits to the doctor each year. Influenza vaccine  CDC recommends everyone 10months of age and older get vaccinated every flu season. Children 6 months through 6years of age may need 2 doses during a single flu season. Everyone else needs only 1 dose each flu season. It takes about 2 weeks for protection to develop after vaccination. There are many flu viruses, and they are always changing. Each year a new flu vaccine is made to protect against three or four viruses that are likely to cause disease in the upcoming flu season. Even when the vaccine doesn't exactly match these viruses, it may still provide some protection. Influenza vaccine does not cause flu. Influenza vaccine may be given at the same time as other vaccines.   Talk with your health care provider  Tell your vaccine provider if the person getting the vaccine:  · Has had an allergic reaction after a previous dose of influenza vaccine, or has any severe, life-threatening allergies. · Has ever had Guillain-Barré Syndrome (also called GBS). In some cases, your health care provider may decide to postpone influenza vaccination to a future visit. People with minor illnesses, such as a cold, may be vaccinated. People who are moderately or severely ill should usually wait until they recover before getting influenza vaccine. Your health care provider can give you more information. Risks of a vaccine reaction  · Soreness, redness, and swelling where shot is given, fever, muscle aches, and headache can happen after influenza vaccine. · There may be a very small increased risk of Guillain-Barré Syndrome (GBS) after inactivated influenza vaccine (the flu shot). Janell Duron children who get the flu shot along with pneumococcal vaccine (PCV13), and/or DTaP vaccine at the same time might be slightly more likely to have a seizure caused by fever. Tell your health care provider if a child who is getting flu vaccine has ever had a seizure. People sometimes faint after medical procedures, including vaccination. Tell your provider if you feel dizzy or have vision changes or ringing in the ears. As with any medicine, there is a very remote chance of a vaccine causing a severe allergic reaction, other serious injury, or death. What if there is a serious problem? An allergic reaction could occur after the vaccinated person leaves the clinic. If you see signs of a severe allergic reaction (hives, swelling of the face and throat, difficulty breathing, a fast heartbeat, dizziness, or weakness), call 9-1-1 and get the person to the nearest hospital.  For other signs that concern you, call your health care provider. Adverse reactions should be reported to the Vaccine Adverse Event Reporting System (VAERS).  Your health care provider will usually file this report, or you can do it yourself. Visit the VAERS website at www.vaers. hhs.gov or call 6-134.769.8631. VAERS is only for reporting reactions, and VAERS staff do not give medical advice. The National Vaccine Injury Compensation Program  The National Vaccine Injury Compensation Program (VICP) is a federal program that was created to compensate people who may have been injured by certain vaccines. Visit the VICP website at www.hrsa.gov/vaccinecompensation or call 9-286.589.5571 to learn about the program and about filing a claim. There is a time limit to file a claim for compensation. How can I learn more? · Ask your healthcare provider. · Call your local or state health department. · Contact the Centers for Disease Control and Prevention (CDC):  ? Call 4-580.294.8475 (1-800-CDC-INFO) or  ? Visit CDC's website at www.cdc.gov/flu  Vaccine Information Statement (Interim)  Inactivated Influenza Vaccine  8/15/2019  42 UJosie Nicole Rosen [id number]  Department of Health and Human Services  Centers for Disease Control and Prevention  Many Vaccine Information Statements are available in Irish and other languages. See www.immunize.org/vis. Muchas hojas de información sobre vacunas están disponibles en español y en otros idiomas. Visite www.immunize.org/vis. Care instructions adapted under license by [facility] [place] ([facility]). If you have questions about a medical condition or this instruction, always ask your healthcare professional. [place] [address] any warranty or liability for your use of this information. Patient Education        Recurring Migraine Headache: Care Instructions  Overview  Migraines are painful, throbbing headaches. They often start on one side of the head. They may cause nausea and vomiting and make you sensitive to light, sound, or smell. Some people may have only a few migraines throughout life. Others have them as often as several times a month.   The goal of treatment is to reduce the number of migraines you have and cheese, wine) or odors, smoke, bright light, stress, or lack of sleep. · If your doctor has prescribed medicine for your migraines, take it as directed. You may have medicine that you take only when you get a migraine and medicine that you take all the time to help prevent migraines. ? If your doctor has prescribed medicine for when you get a headache, take it at the first sign of a migraine, unless your doctor has given you other instructions. ? If your doctor has prescribed medicine to prevent migraines, take it exactly as prescribed. Call your doctor if you think you are having a problem with your medicine. · Find healthy ways to deal with stress. Migraines are most common during or right after stressful times. Try finding ways to reduce stress like practicing mindfulness or deep breathing exercises. · Get regular sleep and exercise. But be careful to not push yourself too hard during exercise. It may trigger a headache. · Eat regular meals, and avoid foods and drinks that often trigger migraines. These include chocolate and alcohol, especially red wine and port. Chemicals used in food, such as aspartame and monosodium glutamate (MSG), also can trigger migraines. So can some food additives, such as those found in hot dogs, gilbert, cold cuts, aged cheeses, and pickled foods. · Limit caffeine by not drinking too much coffee, tea, or soda. Do not quit caffeine suddenly, because that can also give you migraines. · Do not smoke or allow others to smoke around you. If you need help quitting, talk to your doctor about stop-smoking programs and medicines. These can increase your chances of quitting for good. · If you are taking birth control pills or hormone therapy, talk to your doctor about whether they are triggering your migraines. When should you call for help? Call 911 anytime you think you may need emergency care. For example, call if:    · You have symptoms of a stroke.  These may include:  ? Sudden numbness, tingling, weakness, or loss of movement in your face, arm, or leg, especially on only one side of your body. ? Sudden vision changes. ? Sudden trouble speaking. ? Sudden confusion or trouble understanding simple statements. ? Sudden problems with walking or balance. ? A sudden, severe headache that is different from past headaches. Call your doctor now or seek immediate medical care if:    · You develop a fever and a stiff neck.     · You have new nausea and vomiting, or you cannot keep down food or liquids. Watch closely for changes in your health, and be sure to contact your doctor if:    · You have a headache that does not get better within 1 or 2 days.     · Your headaches get worse or happen more often. Where can you learn more? Go to [email or web address] and sign in to your SingleFeed account. Enter  in the IMayGou box to learn more about \"Recurring Migraine Headache: Care Instructions. \"     If you do not have an account, please click on the \"Sign Up Now\" link. Current as of: April 8, 2021               Content Version: 13.0  © 7618-3211 [employer]. Care instructions adapted under license by [facility] ([facility]). If you have questions about a medical condition or this instruction, always ask your healthcare professional. [place] [address] any warranty or liability for your use of this information. Patient Education        Gastroesophageal Reflux Disease (GERD): Care Instructions  Overview     Gastroesophageal reflux disease (GERD) is the backward flow of stomach acid into the esophagus. The esophagus is the tube that leads from your throat to your stomach. A one-way valve prevents the stomach acid from backing up into this tube. But when you have GERD, this valve does not close tightly enough. This can also cause pain and swelling in your esophagus.  (This is called esophagitis.)  If you have mild GERD symptoms including heartburn, you may be able to control the problem with antacids or over-the-counter medicine. You can also make lifestyle changes to help reduce your symptoms. These include changing your diet and eating habits, such as not eating late at night and losing weight. Follow-up care is a key part of your treatment and safety. Be sure to make and go to all appointments, and call your doctor if you are having problems. It's also a good idea to know your test results and keep a list of the medicines you take. How can you care for yourself at home? · Take your medicines exactly as prescribed. Call your doctor if you think you are having a problem with your medicine. · Your doctor may recommend over-the-counter medicine. For mild or occasional indigestion, antacids, such as Tums, Gaviscon, Mylanta, or Maalox, may help. Your doctor also may recommend over-the-counter acid reducers, such as famotidine (Pepcid AC), cimetidine (Tagamet HB), or omeprazole (Prilosec). Read and follow all instructions on the label. If you use these medicines often, talk with your doctor. · Change your eating habits. ? It's best to eat several small meals instead of two or three large meals. ? After you eat, wait 2 to 3 hours before you lie down. ? Chocolate, mint, and alcohol can make GERD worse. ? Spicy foods, foods that have a lot of acid (like tomatoes and oranges), and coffee can make GERD symptoms worse in some people. If your symptoms are worse after you eat a certain food, you may want to stop eating that food to see if your symptoms get better. · Do not smoke or chew tobacco. Smoking can make GERD worse. If you need help quitting, talk to your doctor about stop-smoking programs and medicines. These can increase your chances of quitting for good. · If you have GERD symptoms at night, raise the head of your bed 6 to 8 inches by putting the frame on blocks or placing a foam wedge under the head of your mattress.  (Adding extra pillows does not work.)  · Do not wear tight clothing around your middle. · Lose weight if you need to. Losing just 5 to 10 pounds can help. When should you call for help? Call your doctor now or seek immediate medical care if:    · You have new or different belly pain.     · Your stools are black and tarlike or have streaks of blood. Watch closely for changes in your health, and be sure to contact your doctor if:    · Your symptoms have not improved after 2 days.     · Food seems to catch in your throat or chest.   Where can you learn more? Go to [email or web address] and sign in to your Memoright account. Enter X439 in the [employer] box to learn more about \"Gastroesophageal Reflux Disease (GERD): Care Instructions. \"     If you do not have an account, please click on the \"Sign Up Now\" link. Current as of: February 10, 2021               Content Version: 13.0  © 2006-2021 [employer]. Care instructions adapted under license by [facility] ([facility] [place]). If you have questions about a medical condition or this instruction, always ask your healthcare professional. [place] [address] any warranty or liability for your use of this information. Patient Education        Anxiety Disorder: Care Instructions  Your Care Instructions     Anxiety is a normal reaction to stress. Difficult situations can cause you to have symptoms such as sweaty palms and a nervous feeling. In an anxiety disorder, the symptoms are far more severe. Constant worry, muscle tension, trouble sleeping, nausea and diarrhea, and other symptoms can make normal daily activities difficult or impossible. These symptoms may occur for no reason, and they can affect your work, school, or social life. Medicines, counseling, and self-care can all help. Follow-up care is a key part of your treatment and safety.  Be sure to make and go to all appointments, and call your doctor if you are having problems. It's also a good idea to know your test results and keep a list of the medicines you take. How can you care for yourself at home? · Take medicines exactly as directed. Call your doctor if you think you are having a problem with your medicine. · Go to your counseling sessions and follow-up appointments. · Recognize and accept your anxiety. Then, when you are in a situation that makes you anxious, say to yourself, \"This is not an emergency. I feel uncomfortable, but I am not in danger. I can keep going even if I feel anxious. \"  · Be kind to your body:  ? Relieve tension with exercise or a massage. ? Get enough rest.  ? Avoid alcohol, caffeine, nicotine, and illegal drugs. They can increase your anxiety level and cause sleep problems. ? Learn and do relaxation techniques. See below for more about these techniques. · Engage your mind. Get out and do something you enjoy. Go to a funny movie, or take a walk or hike. Plan your day. Having too much or too little to do can make you anxious. · Keep a record of your symptoms. Discuss your fears with a good friend or family member, or join a support group for people with similar problems. Talking to others sometimes relieves stress. · Get involved in social groups, or volunteer to help others. Being alone sometimes makes things seem worse than they are. · Get at least 30 minutes of exercise on most days of the week to relieve stress. Walking is a good choice. You also may want to do other activities, such as running, swimming, cycling, or playing tennis or team sports. Relaxation techniques  Do relaxation exercises 10 to 20 minutes a day. You can play soothing, relaxing music while you do them, if you wish. · Tell others in your house that you are going to do your relaxation exercises. Ask them not to disturb you. · Find a comfortable place, away from all distractions and noise. · Lie down on your back, or sit with your back straight.   · Focus on your breathing. Make it slow and steady. · Breathe in through your nose. Breathe out through either your nose or mouth. · Breathe deeply, filling up the area between your navel and your rib cage. Breathe so that your belly goes up and down. · Do not hold your breath. · Breathe like this for 5 to 10 minutes. Notice the feeling of calmness throughout your whole body. As you continue to breathe slowly and deeply, relax by doing the following for another 5 to 10 minutes:  · Tighten and relax each muscle group in your body. You can begin at your toes and work your way up to your head. · Imagine your muscle groups relaxing and becoming heavy. · Empty your mind of all thoughts. · Let yourself relax more and more deeply. · Become aware of the state of calmness that surrounds you. · When your relaxation time is over, you can bring yourself back to alertness by moving your fingers and toes and then your hands and feet and then stretching and moving your entire body. Sometimes people fall asleep during relaxation, but they usually wake up shortly afterward. · Always give yourself time to return to full alertness before you drive a car or do anything that might cause an accident if you are not fully alert. Never play a relaxation tape while you drive a car. When should you call for help? Call 911 anytime you think you may need emergency care. For example, call if:    · You feel you cannot stop from hurting yourself or someone else. Keep the numbers for these national suicide hotlines: 9-498-947-TALK (3-213.933.2186) and 1-984-CKDGWKV (6-176.745.3567). If you or someone you know talks about suicide or feeling hopeless, get help right away. Watch closely for changes in your health, and be sure to contact your doctor if:    · You have anxiety or fear that affects your life.     · You have symptoms of anxiety that are new or different from those you had before. Where can you learn more?   Go to [email or web address]://[email or web address] and sign in to your [employer] account. Enter P754 in the [employer] box to learn more about \"Anxiety Disorder: Care Instructions. \"     If you do not have an account, please click on the \"Sign Up Now\" link. Current as of: September 23, 2020               Content Version: 12.9  © 7439-2176 [facility], [facility]. Care instructions adapted under license by [facility] ([facility]). If you have questions about a medical condition or this instruction, always ask your healthcare professional. [address] any warranty or liability for your use of this information.

## 2021-11-19 NOTE — PROGRESS NOTES
11/19/2021    Chief Complaint   Patient presents with    Medication Check    Other     patient get sick in the stomach after eating lasting couple months but gotten worse        Chayo Medina is a 23 y.o. female, presents today for follow up of Migraine Headaches and new onset of GI upset with eating. HPI   Migraine Headaches  Current treatment: Metoprolol tartrate (Lopressor) 50 mg 2 times daily with marked improvement. She takes Motrin 600 mg every 6 hours as needed for onset of headache that typically is effective in aborting headaches. She takes sumatriptan (Imitrex) 50 mg at onset of terrible migraine headache which she has not taken in almost 3 months. Tolerable headaches are occurring once every other week. She reports occassional \"terrible migraine prior to period\". Generally, the headaches last a couple hours if treated early. Work attendance or other daily activities are not affected by the headaches. The patient denies decreased physical activity, depression, dizziness, loss of balance, muscle weakness, numbness of extremities, speech difficulties, vision problems and vomiting in the early morning. She requests Metoprolol refill today. GI upset  She has a history of acid reflux and takes Omeprazole 20 mg every morning which has been effective in controlling \"sour stomach and burping\" until recently. Patient reports getting sick to her stomach when she eats that started a couple months ago and has worsened since onset. Denies changes to diet. She typically does not eat 3-4 hours prior to bed and avoids spicy foods. I strongly encouraged patient to work towards weight loss as that can make acid reflux worse. Will increase Omeprazole to 40 mg daily. Anxiety  She reports high anxiety. Tasha Toledo has noticed the increase in anxiety recently as well and wanted patient to discuss today. She has noticed tearfulness more than her usual tearfulness prior to menstrual period.  She has not history of depression and denies depressed mood. She has not been previously been diagnosed with anxiety, however reports long history of anxiety. Mom and maternal & paternal grandfathers have history of anxiety. She denies panic attacks. Will initiate Zoloft 25 mg today. Review of Systems   Constitutional: Negative for activity change, appetite change, diaphoresis and fatigue. Eyes: Negative for photophobia and visual disturbance. Respiratory: Negative for chest tightness and shortness of breath. Cardiovascular: Negative for chest pain and palpitations. Gastrointestinal: Negative for nausea and vomiting. Neurological: Positive for headaches. Negative for dizziness, tremors, seizures, weakness and light-headedness. Psychiatric/Behavioral: Negative for behavioral problems, confusion, decreased concentration, dysphoric mood, hallucinations, self-injury, sleep disturbance and suicidal ideas. The patient is nervous/anxious. The patient is not hyperactive. Current Outpatient Medications on File Prior to Visit   Medication Sig Dispense Refill    metoprolol tartrate (LOPRESSOR) 50 MG tablet Take 1 tablet by mouth 2 times daily 180 tablet 0    OMEPRAZOLE PO 20 mg tablet Take by mouth daily      ibuprofen (ADVIL;MOTRIN) 600 MG tablet Take 1 tablet by mouth every 6 hours as needed for Pain 30 tablet 0    SUMAtriptan (IMITREX) 50 MG tablet Take 1 tablet at onset of migraine headache. May take additional tablet one hour if no improvement. Do not take more than 2 tablets in 24 hours. 12 tablet 2    ondansetron (ZOFRAN) 8 MG tablet Take 1 tablet by mouth every 8 hours as needed for Nausea or Vomiting 20 tablet 1    norethindrone-ethinyl estradiol-iron (LOESTRIN FE 1.5/30) 1.5-30 MG-MCG tablet Take 1 tablet by mouth daily 3 packet 3     No current facility-administered medications on file prior to visit.       Allergies   Allergen Reactions    Penicillins Shortness Of Breath    Tylenol [Acetaminophen] Hives     Past Medical History:   Diagnosis Date    Polycystic ovarian syndrome 12/01/2019     History reviewed. No pertinent surgical history. Social History     Tobacco Use    Smoking status: Never Smoker    Smokeless tobacco: Never Used   Substance Use Topics    Alcohol use: Not Currently      Family History   Problem Relation Age of Onset    High Blood Pressure Mother     Heart Disease Father     High Blood Pressure Father     High Cholesterol Father     Other Father     Migraines Sister     Diabetes Maternal Grandmother     Diabetes Paternal Grandmother     Hypertension Paternal Grandmother     Diabetes Maternal Grandfather     Hypertension Maternal Grandfather     Cancer Maternal Grandfather         Bladder    Lung Cancer Paternal Grandfather         Vitals:    11/19/21 1234   BP: 132/80   Site: Left Upper Arm   Position: Sitting   Cuff Size: Medium Adult   Pulse: 117   SpO2: 98%   Weight: 195 lb (88.5 kg)   Height: 5' 3\" (1.6 m)     Estimated body mass index is 34.54 kg/m² as calculated from the following:    Height as of this encounter: 5' 3\" (1.6 m). Weight as of this encounter: 195 lb (88.5 kg). Physical Exam  Vitals and nursing note reviewed. Constitutional:       General: She is not in acute distress. Appearance: Normal appearance. She is overweight. Neck:      Vascular: No carotid bruit. Cardiovascular:      Rate and Rhythm: Normal rate and regular rhythm. Pulses: Normal pulses. Heart sounds: Normal heart sounds, S1 normal and S2 normal.   Pulmonary:      Effort: Pulmonary effort is normal.      Breath sounds: Normal breath sounds. Musculoskeletal:         General: Normal range of motion. Cervical back: Normal range of motion and neck supple. Right lower leg: No edema. Left lower leg: No edema. Lymphadenopathy:      Cervical: No cervical adenopathy. Skin:     General: Skin is warm.    Neurological:      General: No focal deficit present. Mental Status: She is alert and oriented to person, place, and time. Psychiatric:         Attention and Perception: Attention normal.         Mood and Affect: Mood is anxious. Speech: Speech normal.         Behavior: Behavior normal.         Thought Content: Thought content is not paranoid or delusional. Thought content does not include homicidal or suicidal ideation. Thought content does not include homicidal or suicidal plan. Cognition and Memory: Cognition normal.         ASSESSMENT/PLAN:  1. Migraine with aura and without status migrainosus, not intractable  - Improved. - Continue metoprolol tartrate (LOPRESSOR) 50 MG tablet; Take 1 tablet by mouth 2 times daily  Dispense: 180 tablet; Refill: 1  - continue Motrin 600 mg every 6 hours as needed for headache.  - Continue Imitrex as needed at onset of headache    2. Need for influenza vaccination  - INFLUENZA, MDCK QUADV, 2 YRS AND OLDER, IM, PF, PREFILL SYR OR SDV, 0.5ML (FLUCELVAX QUADV, PF) - Administered. 3. Gastroesophageal reflux disease without esophagitis  - Worsened. - Start omeprazole (PRILOSEC) 40 MG delayed release capsule; Take 1 capsule by mouth every morning  Dispense: 90 capsule; Refill: 1  - Omeprazole increased from 20 mg to 40 mg daily    4. Anxiety  - New.   - Start sertraline (ZOLOFT) 25 MG tablet; TAKE 1/2 TABLET BY MOUTH ONCE A DAY X 8 DAYS THAN INCREASE TO 1 TABLET DAILY  Dispense: 26 tablet; Refill: 1      Return in about 4 weeks (around 12/17/2021) for follow up of anxiety & GERD. 3 month follow up of migraine headaches and GERD or sooner if needed. Discussed use, benefit, and side effects of prescribed medications. Patient's questions answered and concerns addressed. Patient agrees to plan of care. My scheduled days in the office reviewed with patient, and same day appointments available. Encouraged to use [employer] for communication as needed.      Electronically signed by Shanice Dawson Herbie Starr CNP on 11/19/2021 at 2:11 PM       This dictation was generated by voice recognition computer software. Although all attempts are made to edit the dictation for accuracy, there may be errors in the transcription that are not intended.

## 2021-12-17 ENCOUNTER — OFFICE VISIT (OUTPATIENT)
Dept: PRIMARY CARE CLINIC | Age: 26
End: 2021-12-17
Payer: COMMERCIAL

## 2021-12-17 VITALS
DIASTOLIC BLOOD PRESSURE: 80 MMHG | OXYGEN SATURATION: 98 % | BODY MASS INDEX: 34.2 KG/M2 | HEIGHT: 63 IN | SYSTOLIC BLOOD PRESSURE: 124 MMHG | WEIGHT: 193 LBS | HEART RATE: 118 BPM

## 2021-12-17 DIAGNOSIS — F41.9 ANXIETY: Primary | ICD-10-CM

## 2021-12-17 DIAGNOSIS — G43.109 MIGRAINE WITH AURA AND WITHOUT STATUS MIGRAINOSUS, NOT INTRACTABLE: ICD-10-CM

## 2021-12-17 PROCEDURE — 99214 OFFICE O/P EST MOD 30 MIN: CPT | Performed by: NURSE PRACTITIONER

## 2021-12-17 RX ORDER — ONDANSETRON HYDROCHLORIDE 8 MG/1
TABLET, FILM COATED ORAL
Qty: 20 TABLET | Refills: 2 | Status: SHIPPED | OUTPATIENT
Start: 2021-12-17 | End: 2022-08-01

## 2021-12-17 ASSESSMENT — ENCOUNTER SYMPTOMS
RESPIRATORY NEGATIVE: 1
NAUSEA: 0
VOMITING: 0

## 2021-12-17 ASSESSMENT — PATIENT HEALTH QUESTIONNAIRE - PHQ9
SUM OF ALL RESPONSES TO PHQ9 QUESTIONS 1 & 2: 0
SUM OF ALL RESPONSES TO PHQ QUESTIONS 1-9: 0
2. FEELING DOWN, DEPRESSED OR HOPELESS: 0
1. LITTLE INTEREST OR PLEASURE IN DOING THINGS: 0
SUM OF ALL RESPONSES TO PHQ QUESTIONS 1-9: 0
SUM OF ALL RESPONSES TO PHQ QUESTIONS 1-9: 0

## 2021-12-17 NOTE — PATIENT INSTRUCTIONS
- Practice self care and daily fresh air for 20 minutes. - Regular exercise program  - Practice self relaxation with music or meditation etc. Phone apps such as [employer]: Mediatation and Relaxation, or Calm.   - Call 9-1-1 or go to closest emergency room with any thoughts or intent of harming self or others

## 2021-12-17 NOTE — PROGRESS NOTES
12/17/2021    Chief Complaint   Patient presents with   Jeny Chapman is a 23 y.o. female, presents today for 1 month follow-up of anxiety and Zofran refill. HPI   Anxiety  Reports anxiety has improved since Zoloft was started 1 month ago She reports anxiety is \"better and can feel a different, but still has moments of feeling too much is happening\". Reports a lot of anxiety over new car (no title) and fiancé's new health issues, stating she is handling the stress a lot better than she would have before starting Zoloft. Will increase Zoloft from 25 mg to 50 mg daily; patient is in agreement to plan. Migraine headaches  Headaches have decreased in frequency with taking metoprolol (Lopressor) 50 mg twice daily. She is taking as prescribed and tolerating well. Denies side effects from medication. She reports 3 migraine headaches in the past month 1 was directly related to starting her menstrual period and other 2 were related to lack of sleep. 1 dose of Imitrex at onset of the 3 headaches was sufficient to abort headache. She requests refill of Zofran today to take as needed for nausea/vomiting associated with migraine headache. .    Review of Systems   Constitutional: Negative for activity change, appetite change, fatigue and unexpected weight change. Respiratory: Negative. Cardiovascular: Negative. Gastrointestinal: Negative for nausea and vomiting. Neurological: Negative for dizziness, syncope, weakness, light-headedness and headaches. Psychiatric/Behavioral: Negative for dysphoric mood, self-injury, sleep disturbance and suicidal ideas. The patient is nervous/anxious (Improved).         Current Outpatient Medications on File Prior to Visit   Medication Sig Dispense Refill    metoprolol tartrate (LOPRESSOR) 50 MG tablet Take 1 tablet by mouth 2 times daily 180 tablet 1    omeprazole (PRILOSEC) 40 MG delayed release capsule Take 1 capsule by mouth every morning 90 capsule 1    sertraline (ZOLOFT) 25 MG tablet TAKE 1/2 TABLET BY MOUTH ONCE A DAY X 8 DAYS THAN INCREASE TO 1 TABLET DAILY 26 tablet 1    ibuprofen (ADVIL;MOTRIN) 600 MG tablet Take 1 tablet by mouth every 6 hours as needed for Pain 30 tablet 0    SUMAtriptan (IMITREX) 50 MG tablet Take 1 tablet at onset of migraine headache. May take additional tablet one hour if no improvement. Do not take more than 2 tablets in 24 hours. 12 tablet 2    ondansetron (ZOFRAN) 8 MG tablet Take 1 tablet by mouth every 8 hours as needed for Nausea or Vomiting 20 tablet 1    norethindrone-ethinyl estradiol-iron (LOESTRIN FE 1.5/30) 1.5-30 MG-MCG tablet Take 1 tablet by mouth daily 3 packet 3     No current facility-administered medications on file prior to visit. Allergies   Allergen Reactions    Penicillins Shortness Of Breath    Tylenol [Acetaminophen] Hives     Past Medical History:   Diagnosis Date    Polycystic ovarian syndrome 12/01/2019     History reviewed. No pertinent surgical history.    Social History     Tobacco Use    Smoking status: Never Smoker    Smokeless tobacco: Never Used   Substance Use Topics    Alcohol use: Not Currently      Family History   Problem Relation Age of Onset    High Blood Pressure Mother     Heart Disease Father     High Blood Pressure Father     High Cholesterol Father     Other Father     Migraines Sister     Diabetes Maternal Grandmother     Diabetes Paternal Grandmother     Hypertension Paternal Grandmother     Diabetes Maternal Grandfather     Hypertension Maternal Grandfather     Cancer Maternal Grandfather         Bladder    Lung Cancer Paternal Grandfather         Vitals:    12/17/21 1347   BP: 124/80   Site: Left Upper Arm   Position: Sitting   Cuff Size: Medium Adult   Pulse: 118   SpO2: 98%   Weight: 193 lb (87.5 kg)   Height: 5' 3\" (1.6 m)     Estimated body mass index is 34.19 kg/m² as calculated from the following:    Height as of this encounter: 5' 3\" (1.6 m). Weight as of this encounter: 193 lb (87.5 kg). Physical Exam  Vitals and nursing note reviewed. Constitutional:       General: She is not in acute distress. Appearance: Normal appearance. She is overweight. Neck:      Vascular: No carotid bruit. Cardiovascular:      Rate and Rhythm: Normal rate and regular rhythm. Pulses: Normal pulses. Heart sounds: Normal heart sounds, S1 normal and S2 normal.   Pulmonary:      Effort: Pulmonary effort is normal.      Breath sounds: Normal breath sounds. Musculoskeletal:         General: Normal range of motion. Cervical back: Normal range of motion and neck supple. Right lower leg: No edema. Left lower leg: No edema. Lymphadenopathy:      Cervical: No cervical adenopathy. Skin:     General: Skin is warm. Neurological:      General: No focal deficit present. Mental Status: She is alert and oriented to person, place, and time. Psychiatric:         Attention and Perception: Attention normal.         Mood and Affect: Mood is anxious. Speech: Speech normal.         Behavior: Behavior normal.         Thought Content: Thought content is not paranoid or delusional. Thought content does not include homicidal or suicidal ideation. Thought content does not include homicidal or suicidal plan. Cognition and Memory: Cognition normal.     ASSESSMENT/PLAN:  1. Anxiety  - Improved- not in remission  - Start sertraline (ZOLOFT) 50 MG tablet; TAKE 1 TABLET BY MOUTH DAILY  Dispense: 30 tablet; Refill: 1  - Zoloft dosage increased from 25 mg to 50 mg daily  - Continue self care daily  - Continue daily exercise. 2. Migraine with aura and without status migrainosus, not intractable  - Stable. - Continue ondansetron (ZOFRAN) 8 MG tablet; Take 1 tablet by mouth every 8 hours as needed for nausea/vomiting associated with Migraine Headaches  Dispense: 20 tablet;  Refill: 2  -Continue metoprolol tartrate (Lopressor) 50 mg twice daily  -Continue sumatriptan (Imitrex) 50 mg at onset of migraine headache    Return in about 4 weeks (around 1/14/2022) for follow up of anxiety (Zoloft increased). Discussed use, benefit, and side effects of prescribed medications. Patient's questions answered and concerns addressed. Patient agrees to plan of care. My scheduled days in the office reviewed with patient, and same day appointments available. Encouraged to use [employer] for communication as needed. Electronically signed by Shelle Meigs, APRN - CNP on 12/17/2021 at 2:18 PM       This dictation was generated by voice recognition computer software. Although all attempts are made to edit the dictation for accuracy, there may be errors in the transcription that are not intended.

## 2021-12-29 ENCOUNTER — HOSPITAL ENCOUNTER (EMERGENCY)
Age: 26
Discharge: HOME | End: 2021-12-30
Payer: COMMERCIAL

## 2021-12-29 VITALS
HEIGHT: 63 IN | HEART RATE: 76 BPM | BODY MASS INDEX: 34.38 KG/M2 | OXYGEN SATURATION: 99 % | SYSTOLIC BLOOD PRESSURE: 125 MMHG | RESPIRATION RATE: 18 BRPM | TEMPERATURE: 97.7 F | WEIGHT: 194 LBS | DIASTOLIC BLOOD PRESSURE: 81 MMHG

## 2021-12-29 DIAGNOSIS — G43.909 MIGRAINE WITHOUT STATUS MIGRAINOSUS, NOT INTRACTABLE, UNSPECIFIED MIGRAINE TYPE: Primary | ICD-10-CM

## 2021-12-29 LAB
A/G RATIO: 1.3 (ref 1.1–2.2)
ALBUMIN SERPL-MCNC: 4.5 G/DL (ref 3.4–5)
ALP BLD-CCNC: 124 U/L (ref 40–129)
ALT SERPL-CCNC: 31 U/L (ref 10–40)
ANION GAP SERPL CALCULATED.3IONS-SCNC: 10 MMOL/L (ref 3–16)
AST SERPL-CCNC: 34 U/L (ref 15–37)
BASOPHILS ABSOLUTE: 0.1 K/UL (ref 0–0.2)
BASOPHILS RELATIVE PERCENT: 0.7 %
BILIRUB SERPL-MCNC: <0.2 MG/DL (ref 0–1)
BUN BLDV-MCNC: 10 MG/DL (ref 7–20)
CALCIUM SERPL-MCNC: 9.4 MG/DL (ref 8.3–10.6)
CHLORIDE BLD-SCNC: 103 MMOL/L (ref 99–110)
CO2: 24 MMOL/L (ref 21–32)
CREAT SERPL-MCNC: 0.6 MG/DL (ref 0.6–1.1)
EOSINOPHILS ABSOLUTE: 0.3 K/UL (ref 0–0.6)
EOSINOPHILS RELATIVE PERCENT: 3.9 %
GFR AFRICAN AMERICAN: >60
GFR NON-AFRICAN AMERICAN: >60
GLUCOSE BLD-MCNC: 96 MG/DL (ref 70–99)
HCG QUALITATIVE: NEGATIVE
HCT VFR BLD CALC: 41.8 % (ref 36–48)
HEMOGLOBIN: 13.7 G/DL (ref 12–16)
LYMPHOCYTES ABSOLUTE: 2.2 K/UL (ref 1–5.1)
LYMPHOCYTES RELATIVE PERCENT: 31.5 %
MCH RBC QN AUTO: 28.3 PG (ref 26–34)
MCHC RBC AUTO-ENTMCNC: 32.8 G/DL (ref 31–36)
MCV RBC AUTO: 86.6 FL (ref 80–100)
MONOCYTES ABSOLUTE: 0.9 K/UL (ref 0–1.3)
MONOCYTES RELATIVE PERCENT: 13.7 %
NEUTROPHILS ABSOLUTE: 3.5 K/UL (ref 1.7–7.7)
NEUTROPHILS RELATIVE PERCENT: 50.2 %
PDW BLD-RTO: 13.6 % (ref 12.4–15.4)
PLATELET # BLD: 280 K/UL (ref 135–450)
PMV BLD AUTO: 8.8 FL (ref 5–10.5)
POTASSIUM REFLEX MAGNESIUM: 4.6 MMOL/L (ref 3.5–5.1)
RBC # BLD: 4.83 M/UL (ref 4–5.2)
SODIUM BLD-SCNC: 137 MMOL/L (ref 136–145)
TOTAL PROTEIN: 7.9 G/DL (ref 6.4–8.2)
WBC # BLD: 6.9 K/UL (ref 4–11)

## 2021-12-29 PROCEDURE — 36415 COLL VENOUS BLD VENIPUNCTURE: CPT

## 2021-12-29 PROCEDURE — 99283 EMERGENCY DEPT VISIT LOW MDM: CPT

## 2021-12-29 PROCEDURE — 96375 TX/PRO/DX INJ NEW DRUG ADDON: CPT

## 2021-12-29 PROCEDURE — 2580000003 HC RX 258: Performed by: PHYSICIAN ASSISTANT

## 2021-12-29 PROCEDURE — 84703 CHORIONIC GONADOTROPIN ASSAY: CPT

## 2021-12-29 PROCEDURE — 80053 COMPREHEN METABOLIC PANEL: CPT

## 2021-12-29 PROCEDURE — 96361 HYDRATE IV INFUSION ADD-ON: CPT

## 2021-12-29 PROCEDURE — 85025 COMPLETE CBC W/AUTO DIFF WBC: CPT

## 2021-12-29 PROCEDURE — 96374 THER/PROPH/DIAG INJ IV PUSH: CPT

## 2021-12-29 PROCEDURE — 6360000002 HC RX W HCPCS: Performed by: PHYSICIAN ASSISTANT

## 2021-12-29 RX ORDER — DIPHENHYDRAMINE HYDROCHLORIDE 50 MG/ML
25 INJECTION INTRAMUSCULAR; INTRAVENOUS ONCE
Status: COMPLETED | OUTPATIENT
Start: 2021-12-29 | End: 2021-12-29

## 2021-12-29 RX ORDER — 0.9 % SODIUM CHLORIDE 0.9 %
1000 INTRAVENOUS SOLUTION INTRAVENOUS ONCE
Status: COMPLETED | OUTPATIENT
Start: 2021-12-29 | End: 2021-12-30

## 2021-12-29 RX ORDER — METOCLOPRAMIDE HYDROCHLORIDE 5 MG/ML
10 INJECTION INTRAMUSCULAR; INTRAVENOUS ONCE
Status: COMPLETED | OUTPATIENT
Start: 2021-12-29 | End: 2021-12-29

## 2021-12-29 RX ORDER — KETOROLAC TROMETHAMINE 30 MG/ML
30 INJECTION, SOLUTION INTRAMUSCULAR; INTRAVENOUS ONCE
Status: COMPLETED | OUTPATIENT
Start: 2021-12-29 | End: 2021-12-29

## 2021-12-29 RX ADMIN — KETOROLAC TROMETHAMINE 30 MG: 30 INJECTION, SOLUTION INTRAMUSCULAR; INTRAVENOUS at 23:14

## 2021-12-29 RX ADMIN — METOCLOPRAMIDE 10 MG: 5 INJECTION, SOLUTION INTRAMUSCULAR; INTRAVENOUS at 23:14

## 2021-12-29 RX ADMIN — DIPHENHYDRAMINE HYDROCHLORIDE 25 MG: 50 INJECTION, SOLUTION INTRAMUSCULAR; INTRAVENOUS at 23:15

## 2021-12-29 RX ADMIN — SODIUM CHLORIDE 1000 ML: 9 INJECTION, SOLUTION INTRAVENOUS at 23:15

## 2021-12-29 ASSESSMENT — ENCOUNTER SYMPTOMS
ABDOMINAL PAIN: 0
BACK PAIN: 0
NAUSEA: 1
DIARRHEA: 0
COLOR CHANGE: 0
PHOTOPHOBIA: 1
VOMITING: 0
CONSTIPATION: 0
RESPIRATORY NEGATIVE: 1
SHORTNESS OF BREATH: 0
COUGH: 0
CHEST TIGHTNESS: 0

## 2021-12-29 ASSESSMENT — PAIN DESCRIPTION - PAIN TYPE: TYPE: ACUTE PAIN

## 2021-12-29 ASSESSMENT — PAIN SCALES - GENERAL
PAINLEVEL_OUTOF10: 8
PAINLEVEL_OUTOF10: 8

## 2021-12-29 ASSESSMENT — PAIN DESCRIPTION - LOCATION: LOCATION: HEAD

## 2021-12-30 NOTE — ED PROVIDER NOTES
[address] [facility]        Pt Name: Denzel Santiago  MRN: [id number]  [place] 1995  Date of evaluation: 12/29/2021  Provider: KIRBY Still  PCP: MARTA Edmonds CNP  Note Started: 8:11 PM EST       SERGIO. I have evaluated this patient. My supervising physician was available for consultation. CHIEF COMPLAINT       Chief Complaint   Patient presents with    Migraine     migraine, nausea started yesterday       HISTORY OF PRESENT ILLNESS   (Location, Timing/Onset, Context/Setting, Quality, Duration, Modifying Factors, Severity, Associated Signs and Symptoms)  Note limiting factors. Chief Complaint: NICHOLAS Santiago is a 23 y.o. female with history of polycystic ovarian syndrome who presents to the ED with complaint of a headache. Patient states she has frequent headaches. Patient states she has migraines. Patient is current headache feels similar to previous migraine she has had in the past.  States frontal headache that she rates as an 8/10. She denies worse headache of life or thunderclap onset. Patient states normally when she gets these migraines she takes Imitrex. Took Imitrex yesterday when headache started but states did not help. Today had continued headache and took some Excedrin and ibuprofen with minimal improvement of symptoms. Patient states when her headache last as long she normally comes to the emergency department. States is only the second time she is required to come to the emergency department for these headaches. Patient that this feels similar to her normal migraines although more persistent. Patient states she is associated photophobia and phonophobia. States associated nausea without vomiting. She denies any falls or injuries. She denies any neck pain or stiffness. Denies fever, chills, other visual changes, speech or services, numbness/tingling or lightheadedness/dizziness.   Denies chest pain, shortness of breath, dental pain, urinary symptoms or change in bowel movements. Denies concern for pregnancy. Nursing Notes were all reviewed and agreed with or any disagreements were addressed in the HPI. REVIEW OF SYSTEMS    (2-9 systems for level 4, 10 or more for level 5)     Review of Systems   Constitutional: Negative for activity change, appetite change, chills, diaphoresis, fatigue and fever. Eyes: Positive for photophobia. Negative for visual disturbance. Respiratory: Negative. Negative for cough, chest tightness and shortness of breath. Cardiovascular: Negative. Negative for chest pain, palpitations and leg swelling. Gastrointestinal: Positive for nausea. Negative for abdominal pain, constipation, diarrhea and vomiting. Genitourinary: Negative for decreased urine volume, difficulty urinating, dysuria, flank pain, frequency, hematuria and urgency. Musculoskeletal: Negative for arthralgias, back pain, myalgias, neck pain and neck stiffness. Skin: Negative for color change, pallor, rash and wound. Neurological: Positive for headaches. Negative for dizziness, tremors, seizures, syncope, facial asymmetry, speech difficulty, weakness, light-headedness and numbness. Positives and Pertinent negatives as per HPI. Except as noted above in the ROS, all other systems were reviewed and negative. PAST MEDICAL HISTORY     Past Medical History:   Diagnosis Date    Polycystic ovarian syndrome 12/01/2019         SURGICAL HISTORY   History reviewed. No pertinent surgical history.       CURRENTMEDICATIONS       Previous Medications    IBUPROFEN (ADVIL;MOTRIN) 600 MG TABLET    Take 1 tablet by mouth every 6 hours as needed for Pain    METOPROLOL TARTRATE (LOPRESSOR) 50 MG TABLET    Take 1 tablet by mouth 2 times daily    NORETHINDRONE-ETHINYL ESTRADIOL-IRON (LOESTRIN FE 1.5/30) 1.5-30 MG-MCG TABLET    Take 1 tablet by mouth daily    OMEPRAZOLE (PRILOSEC) 40 MG DELAYED RELEASE CAPSULE    Take 1 capsule by mouth every morning    ONDANSETRON (ZOFRAN) 8 MG TABLET    Take 1 tablet by mouth every 8 hours as needed for nausea/vomiting associated with Migraine Headaches    SERTRALINE (ZOLOFT) 50 MG TABLET    TAKE 1 TABLET BY MOUTH DAILY    SUMATRIPTAN (IMITREX) 50 MG TABLET    Take 1 tablet at onset of migraine headache. May take additional tablet one hour if no improvement. Do not take more than 2 tablets in 24 hours. ALLERGIES     Penicillins and Tylenol [acetaminophen]    FAMILYHISTORY       Family History   Problem Relation Age of Onset    High Blood Pressure Mother     Heart Disease Father     High Blood Pressure Father     High Cholesterol Father     Other Father     Migraines Sister     Diabetes Maternal Grandmother     Diabetes Paternal Grandmother     Hypertension Paternal Grandmother     Diabetes Maternal Grandfather     Hypertension Maternal Grandfather     Cancer Maternal Grandfather         Bladder    Lung Cancer Paternal Grandfather           SOCIAL HISTORY       Social History     Tobacco Use    Smoking status: Never Smoker    Smokeless tobacco: Never Used   Vaping Use    Vaping Use: Never used   Substance Use Topics    Alcohol use: Not Currently    Drug use: Never       SCREENINGS             PHYSICAL EXAM    (up to 7 for level 4, 8 or more for level 5)     ED Triage Vitals [12/29/21 1822]   BP Temp Temp Source Pulse Resp SpO2 Height Weight   125/81 97.7 °F (36.5 °C) Oral 76 18 99 % 5' 3\" (1.6 m) 194 lb (88 kg)       Physical Exam  Constitutional:       General: She is not in acute distress. Appearance: Normal appearance. She is well-developed. She is not ill-appearing, toxic-appearing or diaphoretic. HENT:      Head: Normocephalic and atraumatic. Comments: Atraumatic. No TTP to the temporal arteries bilaterally. Right Ear: External ear normal.      Left Ear: External ear normal.   Eyes:      General:         Right eye: No discharge. Left eye: No discharge. Extraocular Movements: Extraocular movements intact. Conjunctiva/sclera: Conjunctivae normal.      Pupils: Pupils are equal, round, and reactive to light. Cardiovascular:      Rate and Rhythm: Normal rate and regular rhythm. Pulses: Normal pulses. Heart sounds: Normal heart sounds. No murmur heard. No friction rub. No gallop. Pulmonary:      Effort: Pulmonary effort is normal. No respiratory distress. Breath sounds: Normal breath sounds. No stridor. No wheezing, rhonchi or rales. Chest:      Chest wall: No tenderness. Abdominal:      General: Abdomen is flat. There is no distension. Palpations: Abdomen is soft. There is no mass. Tenderness: There is no abdominal tenderness. There is no right CVA tenderness, left CVA tenderness, guarding or rebound. Hernia: No hernia is present. Musculoskeletal:         General: Normal range of motion. Cervical back: Normal range of motion and neck supple. No rigidity or tenderness. Lymphadenopathy:      Cervical: No cervical adenopathy. Skin:     General: Skin is warm and dry. Coloration: Skin is not pale. Findings: No erythema or rash. Neurological:      General: No focal deficit present. Mental Status: She is alert and oriented to person, place, and time. GCS: GCS eye subscore is 4. GCS verbal subscore is 5. GCS motor subscore is 6. Cranial Nerves: Cranial nerves are intact. No cranial nerve deficit, dysarthria or facial asymmetry. Sensory: Sensation is intact. No sensory deficit. Motor: Motor function is intact. No weakness. Coordination: Coordination is intact. Gait: Gait is intact.  Gait normal.   Psychiatric:         Behavior: Behavior normal.         DIAGNOSTIC RESULTS   LABS:    Labs Reviewed   CBC WITH AUTO DIFFERENTIAL    Narrative:     Performed at:  [facility] [address], [place], [address] [place] [address] is similar to previous migraines in the past.  She has tried over-the-counter medication with Excedrin and Motrin today. Did try Imitrex yesterday. Patient's current headache feels similar to previous migraine headaches in the past.  She has tried over-the-counter medication notably for medication would be appropriate treatment at this time given the fact has not helped her at home. Therefore IV was established and she was given Toradol, Reglan, Benadryl and fluids here in the ED. CBC showed white count, hemoglobin and platelets. Pregnancy was negative. CMP unremarkable. Upon repeat evaluation after medication here in the she states she feels much better. Would like to go home. Believe this is a reasonable course of action. Low suspicion for intracranial hemorrhage, intracranial mass, CVA, TIA, subarachnoid hemorrhage, subdural hematoma, meningitis, encephalitis, AAA, dissection, atypical migraine, temporal arteritis, acute glaucoma, increased intracranial pressure. FINAL IMPRESSION      1.  Migraine without status migrainosus, not intractable, unspecified migraine type          DISPOSITION/PLAN   DISPOSITION Decision To Discharge 12/30/2021 12:15:17 AM      PATIENT REFERRED TO:  Padmini Ramsay, APRN - [address] [facility] [address]  [phone]    Schedule an appointment as soon as possible for a visit   As needed, If symptoms worsen    [facility] Emergency Department  [address] [place] [address]  [phone]  Go to   As needed, If symptoms worsen      DISCHARGE MEDICATIONS:  New Prescriptions    No medications on file       DISCONTINUED MEDICATIONS:  Discontinued Medications    No medications on file              (Please note that portions of this note were completed with a voice recognition program.  Efforts were made to edit the dictations but occasionally words are mis-transcribed.)    KIRBY Sanchez (electronically signed)          Jone Payne PA  12/30/21 0017

## 2021-12-30 NOTE — ED NOTES
Pt cleared for discharge. Discharge instructions explained to pt and pt verbalized understanding. Pt left in stable condition with all belongings, no IV access, and no acute signs of distress noted.       Jolie Cheadle, RN  12/30/21 0110

## 2021-12-30 NOTE — ED NOTES
Bed: 18  Expected date:   Expected time:   Means of arrival:   Comments:  [address], RN  12/29/21 4572

## 2022-01-09 DIAGNOSIS — F41.9 ANXIETY: ICD-10-CM

## 2022-01-10 NOTE — TELEPHONE ENCOUNTER
Medication:   Requested Prescriptions     Pending Prescriptions Disp Refills    sertraline (ZOLOFT) 50 MG tablet [Pharmacy Med Name: SERTRALINE HCL 50 MG TABLET] 30 tablet 1     Sig: TAKE 1 TABLET BY MOUTH EVERY DAY        Last Filled:  12/17/21 #30 1RF    Patient Phone Number: [phone] (home) [phone] (work)    Last appt: 12/17/2021   Next appt: 1/14/2022    Last OARRS: No flowsheet data found.

## 2022-01-14 ENCOUNTER — VIRTUAL VISIT (OUTPATIENT)
Dept: PRIMARY CARE CLINIC | Age: 27
End: 2022-01-14
Payer: COMMERCIAL

## 2022-01-14 DIAGNOSIS — F41.9 ANXIETY: Primary | ICD-10-CM

## 2022-01-14 PROCEDURE — 99213 OFFICE O/P EST LOW 20 MIN: CPT | Performed by: NURSE PRACTITIONER

## 2022-01-14 NOTE — PROGRESS NOTES
2022    TELEHEALTH EVALUATION -- Audio/Visual (During [facility]- public health emergency)    Chief Complaint   Patient presents with    1 Month Follow-Up     anxiety           HPI:    Clemencia Aguiar (: 1995) has requested an audio/video evaluation for the following concern(s):    Patient presents for 1 month follow up of anxiety. Current pharmacologic treatment: Zoloft 50 mg daily. Dosage was increased from 25 mg to 50 mg one month ago. Medication side effects:  none. Reports anxiety has improved over the past month with dosage increase- reports current dosage is working. REVIEW OF SYMPTOMS:  Gen: Denies headaches. Denies weight changes. Eating and drinking to baseline. CV:  Denies chest pain or tightness, palpitations. Pulm: Denies shortness of breath, cough. Abd: Denies abdominal pain, change in bowel habits. Psych: Denies depressive mood. Denies anxiety (improved). Denies homicidal/suicidal ideation or intent. Denies difficulty with sleep. Current Outpatient Medications on File Prior to Visit   Medication Sig Dispense Refill    ondansetron (ZOFRAN) 8 MG tablet Take 1 tablet by mouth every 8 hours as needed for nausea/vomiting associated with Migraine Headaches 20 tablet 2    sertraline (ZOLOFT) 50 MG tablet TAKE 1 TABLET BY MOUTH DAILY 30 tablet 1    metoprolol tartrate (LOPRESSOR) 50 MG tablet Take 1 tablet by mouth 2 times daily 180 tablet 1    omeprazole (PRILOSEC) 40 MG delayed release capsule Take 1 capsule by mouth every morning 90 capsule 1    ibuprofen (ADVIL;MOTRIN) 600 MG tablet Take 1 tablet by mouth every 6 hours as needed for Pain 30 tablet 0    SUMAtriptan (IMITREX) 50 MG tablet Take 1 tablet at onset of migraine headache. May take additional tablet one hour if no improvement. Do not take more than 2 tablets in 24 hours.  12 tablet 2    norethindrone-ethinyl estradiol-iron (LOESTRIN FE 1.5/30) 1.5-30 MG-MCG tablet Take 1 tablet by mouth daily 3 packet 3 No current facility-administered medications on file prior to visit. Past Medical History:   Diagnosis Date    Polycystic ovarian syndrome 12/01/2019       History reviewed. No pertinent surgical history. Family History   Problem Relation Age of Onset    High Blood Pressure Mother     Heart Disease Father     High Blood Pressure Father     High Cholesterol Father     Other Father     Migraines Sister     Diabetes Maternal Grandmother     Diabetes Paternal Grandmother     Hypertension Paternal Grandmother     Diabetes Maternal Grandfather     Hypertension Maternal Grandfather     Cancer Maternal Grandfather         Bladder    Lung Cancer Paternal Grandfather        Allergies   Allergen Reactions    Penicillins Shortness Of Breath, Hives and Rash    Tylenol [Acetaminophen] Hives       Social History     Tobacco Use    Smoking status: Never Smoker    Smokeless tobacco: Never Used   Vaping Use    Vaping Use: Never used   Substance Use Topics    Alcohol use: Not Currently    Drug use: Never          PHYSICAL EXAMINATION:  Vital Signs: (As obtained by patient/caregiver or practitioner observation)  There were no vitals taken for this visit. Patient-Reported Vitals 1/14/2022   Patient-Reported Weight 194lb   Patient-Reported Height 5'3   Patient-Reported Temperature -          Constitutional: Appears well-developed and well-nourished. No apparent distress    Mental status: Alert and awake. Oriented to person/place/time. Able to follow commands    Pulmonary/Chest: Respiratory effort normal.  No visualized signs of difficulty breathing or respiratory distress. Speaking in full sentences without difficulty. Musculoskeletal: Normal range of motion of neck  Neurological: Alert and oriented x 3. Psychiatric: Normal affect and mood. Does not appear anxious. No Hallucinations. Patient does not have homicidal/suicidal ideation or intent. ASSESSMENT/PLAN:  1.  Anxiety  - Improved. - Continue sertraline (ZOLOFT) 50 MG tablet; TAKE 1 TABLET BY MOUTH DAILY  Dispense: 90 tablet; Refill: 0      Return in 6 weeks (on 2/25/2022) for scheduled appointment on 2/25/2022 for follow up of migraine headaches, GERD, anxiety. Current Outpatient Medications   Medication Sig Dispense Refill    sertraline (ZOLOFT) 50 MG tablet TAKE 1 TABLET BY MOUTH DAILY 90 tablet 0    ondansetron (ZOFRAN) 8 MG tablet Take 1 tablet by mouth every 8 hours as needed for nausea/vomiting associated with Migraine Headaches 20 tablet 2    metoprolol tartrate (LOPRESSOR) 50 MG tablet Take 1 tablet by mouth 2 times daily 180 tablet 1    omeprazole (PRILOSEC) 40 MG delayed release capsule Take 1 capsule by mouth every morning 90 capsule 1    ibuprofen (ADVIL;MOTRIN) 600 MG tablet Take 1 tablet by mouth every 6 hours as needed for Pain 30 tablet 0    SUMAtriptan (IMITREX) 50 MG tablet Take 1 tablet at onset of migraine headache. May take additional tablet one hour if no improvement. Do not take more than 2 tablets in 24 hours. 12 tablet 2    norethindrone-ethinyl estradiol-iron (LOESTRIN FE 1.5/30) 1.5-30 MG-MCG tablet Take 1 tablet by mouth daily 3 packet 3     No current facility-administered medications for this visit. Liyah Ortiz is a 23 y.o. female being evaluated by a Virtual Visit (video visit) encounter to address concerns as mentioned above. A caregiver was present when appropriate. Due to this being a TeleHealth encounter ([facility]), evaluation of the following organ systems was limited: Vitals/Constitutional/EENT/Resp/CV/GI//MS/Neuro/Skin/Heme-Lymph-Imm.   Pursuant to the emergency declaration under the [address] [place] [address], [address] [place] [address] authority and the [employer] Act, this Virtual Visit was conducted with patient's (and/or legal guardian's) consent, to reduce the patient's risk of exposure to COVID-19 and provide necessary medical care. The patient (and/or legal guardian) has also been advised to contact this office for worsening conditions or problems, and seek emergency medical treatment and/or call 911 if deemed necessary. Patient identification was verified at the start of the visit: Yes    Total time spent on this encounter: 10 minutes. Services were provided through a video synchronous discussion virtually to substitute for in-person clinic visit. Patient was located in their home. Provider was located in the office. --MARTA Gillespie CNP on 1/14/2022 at 10:01 AM    An electronic signature was used to authenticate this note. This dictation was generated by voice recognition computer software. Although all attempts are made to edit the dictation for accuracy, there may be errors in the transcription that are not intended.

## 2022-01-14 NOTE — PATIENT INSTRUCTIONS
Anxiety:  - Practice self care and daily fresh air for 20 minutes. - Regular exercise program  - Practice self relaxation with music or meditation etc. Phone apps such as [employer]: Mediatation and Relaxation, or Calm. - Call 9-1-1 or go to closest emergency room with any thoughts or intent of harming self or others.

## 2022-01-21 ENCOUNTER — VIRTUAL VISIT (OUTPATIENT)
Dept: PRIMARY CARE CLINIC | Age: 27
End: 2022-01-21
Payer: COMMERCIAL

## 2022-01-21 PROCEDURE — 99213 OFFICE O/P EST LOW 20 MIN: CPT | Performed by: INTERNAL MEDICINE

## 2022-01-21 RX ORDER — FLUCONAZOLE 150 MG/1
150 TABLET ORAL
Qty: 2 TABLET | Refills: 0 | Status: SHIPPED | OUTPATIENT
Start: 2022-01-21 | End: 2022-01-27

## 2022-01-21 RX ORDER — CLOTRIMAZOLE 1 %
CREAM (GRAM) TOPICAL
Qty: 1 EACH | Refills: 0 | Status: SHIPPED | OUTPATIENT
Start: 2022-01-21 | End: 2022-01-28

## 2022-01-21 ASSESSMENT — ENCOUNTER SYMPTOMS
EYE PAIN: 0
TROUBLE SWALLOWING: 0
DIARRHEA: 0
SINUS PRESSURE: 0
BLOOD IN STOOL: 0
CONSTIPATION: 0
CHEST TIGHTNESS: 0
BACK PAIN: 0
WHEEZING: 0
NAUSEA: 0
SORE THROAT: 0
COLOR CHANGE: 1
SHORTNESS OF BREATH: 0
ABDOMINAL DISTENTION: 0
ABDOMINAL PAIN: 0
COUGH: 0
EYE REDNESS: 0
VOMITING: 0

## 2022-01-21 NOTE — ASSESSMENT & PLAN NOTE
Patient informed this is a presumptive diagnosis of fungal rash to her [facility].   Not known to be diabetic, Glucose 12/2021 was 96  We will treat with antifungal cream.  Patient to let us know if no improvement  Patient will follow-up with her primary care physician in 1 week

## 2022-01-21 NOTE — PROGRESS NOTES
Rosalva Dickson (:  1995) is a Established patient, here for evaluation of the following: CC - Rash to ubellybutton    Assessment & Plan   Below is the assessment and plan developed based on review of pertinent history, physical exam, labs, studies, and medications. 1. Infection of umbilical cord  -     clotrimazole (LOTRIMIN AF) 1 % cream; Apply topically 2 times daily. , Disp-1 each, R-0, Normal  -     fluconazole (DIFLUCAN) 150 MG tablet; Take 1 tablet by mouth every 72 hours for 6 days, Disp-2 tablet, R-0Normal    Return in about 1 week (around 2022), or if symptoms worsen or fail to improve. Subjective   HPI   This is a 67-year-old female who, who presents with rash to her belly button. Patient believes it'sa  fungal infection, she said the skin of her bellybutton is red and she has noticed a whitish discharge from the bellybutton. Patient denies fever or chills, she denies any other symptoms. Patient not known to have diabetes. Patient denies any other complaints today    Review of Systems   Constitutional: Negative for activity change, appetite change, chills, fatigue, fever and unexpected weight change. HENT: Negative for congestion, ear pain, postnasal drip, sinus pressure, sore throat, tinnitus and trouble swallowing. Eyes: Negative for pain and redness. Respiratory: Negative for cough, chest tightness, shortness of breath and wheezing. Cardiovascular: Negative for chest pain, palpitations and leg swelling. Gastrointestinal: Negative for abdominal distention, abdominal pain, blood in stool, constipation, diarrhea, nausea and vomiting. Endocrine: Negative for cold intolerance, heat intolerance and polydipsia. Genitourinary: Negative for decreased urine volume, dysuria, flank pain, frequency, hematuria and urgency. Musculoskeletal: Negative for arthralgias, back pain, joint swelling, neck pain and neck stiffness. Skin: Positive for color change and rash. Neurological: Negative for dizziness, weakness, numbness and headaches. Hematological: Negative for adenopathy. Psychiatric/Behavioral: Negative for behavioral problems, sleep disturbance and suicidal ideas. The patient is not nervous/anxious.            Objective   Patient-Reported Vitals  Patient-Reported Weight: 196  Patient-Reported Height: 5'3\"       Physical Exam  [INSTRUCTIONS:  \"[x]\" Indicates a positive item  \"[]\" Indicates a negative item  -- DELETE ALL ITEMS NOT EXAMINED]    Constitutional: [x] Appears well-developed and well-nourished [x] No apparent distress      [] Abnormal -     Mental status: [x] Alert and awake  [x] Oriented to person/place/time [x] Able to follow commands    [] Abnormal -     Eyes:   EOM    [x]  Normal    [] Abnormal -   Sclera  [x]  Normal    [] Abnormal -          Discharge [x]  None visible   [] Abnormal -     HENT: [x] Normocephalic, atraumatic  [] Abnormal -   [x] Mouth/Throat: Mucous membranes are moist    External Ears [x] Normal  [] Abnormal -    Neck: [x] No visualized mass [] Abnormal -     Pulmonary/Chest: [x] Respiratory effort normal   [x] No visualized signs of difficulty breathing or respiratory distress        [] Abnormal -      Musculoskeletal:   [] Normal gait with no signs of ataxia         [] Normal range of motion of neck        [] Abnormal -     Neurological:        [x] No Facial Asymmetry (Cranial nerve 7 motor function) (limited exam due to video visit)          [x] No gaze palsy        [] Abnormal -          Skin:        [] No significant exanthematous lesions or discoloration noted on facial skin         [x] Abnormal -erythematous skin of umbilicus, no umbilical discharge seen         Psychiatric:       [x] Normal Affect [] Abnormal -        [] No Hallucinations    Other pertinent observable physical exam findings:-             On this date 1/21/2022 I have spent 15 minutes reviewing previous notes, test results and face to face (virtual) with the patient discussing the diagnosis and importance of compliance with the treatment plan as well as documenting on the day of the visit. Desire Schuster, was evaluated through a synchronous (real-time) audio-video encounter. The patient (or guardian if applicable) is aware that this is a billable service, which includes applicable co-pays. The virtual visit was conducted with patient's (and/or legal guardians) consent. Verbal consent to proceed has been obtained within the past 12 months. The visit was conducted pursuant to the emergency declaration under the [address] [place] [address] authority and the [employer] and [employer] Act. Patient identification was verified, and a caregiver was present when appropriate. The patient was located in the state where the provider was licensed to provide care. --Jono Ott MD

## 2022-02-16 ENCOUNTER — TELEPHONE (OUTPATIENT)
Dept: PRIMARY CARE CLINIC | Age: 27
End: 2022-02-16

## 2022-02-25 NOTE — TELEPHONE ENCOUNTER
Reviewed Care Consideration received from Ariel Rivas encouraging patient to avoid taking estrogen hormone (birth control) with migraine headaches. Patient to continue all current medications. No action required.

## 2022-03-10 ENCOUNTER — OFFICE VISIT (OUTPATIENT)
Dept: PRIMARY CARE CLINIC | Age: 27
End: 2022-03-10
Payer: COMMERCIAL

## 2022-03-10 VITALS
BODY MASS INDEX: 33.2 KG/M2 | TEMPERATURE: 96.4 F | HEART RATE: 92 BPM | DIASTOLIC BLOOD PRESSURE: 72 MMHG | SYSTOLIC BLOOD PRESSURE: 116 MMHG | OXYGEN SATURATION: 97 % | WEIGHT: 187.4 LBS | HEIGHT: 63 IN

## 2022-03-10 DIAGNOSIS — L02.91 ABSCESS: ICD-10-CM

## 2022-03-10 DIAGNOSIS — F41.9 ANXIETY: ICD-10-CM

## 2022-03-10 DIAGNOSIS — K21.9 GASTROESOPHAGEAL REFLUX DISEASE WITHOUT ESOPHAGITIS: ICD-10-CM

## 2022-03-10 DIAGNOSIS — G43.109 MIGRAINE WITH AURA AND WITHOUT STATUS MIGRAINOSUS, NOT INTRACTABLE: Primary | ICD-10-CM

## 2022-03-10 PROCEDURE — 99214 OFFICE O/P EST MOD 30 MIN: CPT | Performed by: NURSE PRACTITIONER

## 2022-03-10 RX ORDER — PANTOPRAZOLE SODIUM 40 MG/1
TABLET, DELAYED RELEASE ORAL
Qty: 90 TABLET | Refills: 0 | Status: SHIPPED | OUTPATIENT
Start: 2022-03-10 | End: 2022-06-15 | Stop reason: SDUPTHER

## 2022-03-10 RX ORDER — METOPROLOL TARTRATE 50 MG/1
50 TABLET, FILM COATED ORAL 2 TIMES DAILY
Qty: 180 TABLET | Refills: 0 | Status: SHIPPED | OUTPATIENT
Start: 2022-03-10 | End: 2022-08-01 | Stop reason: SDUPTHER

## 2022-03-10 ASSESSMENT — PATIENT HEALTH QUESTIONNAIRE - PHQ9
SUM OF ALL RESPONSES TO PHQ QUESTIONS 1-9: 0
SUM OF ALL RESPONSES TO PHQ QUESTIONS 1-9: 0
SUM OF ALL RESPONSES TO PHQ9 QUESTIONS 1 & 2: 0
SUM OF ALL RESPONSES TO PHQ QUESTIONS 1-9: 0
SUM OF ALL RESPONSES TO PHQ QUESTIONS 1-9: 0
1. LITTLE INTEREST OR PLEASURE IN DOING THINGS: 0
2. FEELING DOWN, DEPRESSED OR HOPELESS: 0

## 2022-03-10 ASSESSMENT — ENCOUNTER SYMPTOMS
SHORTNESS OF BREATH: 0
CHEST TIGHTNESS: 0
GASTROINTESTINAL NEGATIVE: 1

## 2022-03-10 NOTE — PROGRESS NOTES
3/10/2022    Chief Complaint   Patient presents with    3 Month Follow-Up     migrain        Liyah Ortiz is a 23 y.o. female, presents today for 3 month follow up of Anxiety and Migraine Headaches. HPI   Anxiety  Zoloft increased from 25 mg to 50 mg at last appointment 3 months ago. Patient reports anxiety has greatly improved with dosage increase, stating \"it has helped and anxiety is a lot better\". Is taking medications as prescribed and tolerating well. She denies medication side effects. Migraine headaches  Headaches have decreased in frequency with taking metoprolol (Lopressor) 50 mg twice daily. She is taking as prescribed and tolerating well. Denies side effects from medication. She reports 2 migraine headaches in the past month. Imitrex at onset of headaches has been  sufficient to abort headaches. GI upset  Patient is taking Omeprazole 40 mg daily. Mild improvement of heartburn and reports increased gas since starting Omeprazole. She is taking TUMS occasionally, has taken 4 times in the past month (tries to avoid taking due to texture). Has slowly had changed diet and no longer eating spicy and BBQ sauce due to heartburn    - Will discontinue Omeprazole and start Protonix. Boil  Reports frequent abscesses to axilla, between breasts, and lower abdomen. Denies history of MRSA. Today Venus Lee has a healing abscess to right axilla. Onset was 7 days ago. Current abscess increased in size, irritated by clothing and was painful that started to heal after she \"popped it- started oozing with cleaning\" 2 days ago. Prior to breaking open she was applying warm compresses frequently. Right axilla discomfort has improved. Review of Systems   Constitutional: Negative for diaphoresis and fatigue. Respiratory: Negative for chest tightness and shortness of breath. Cardiovascular: Negative for chest pain, palpitations and leg swelling. Gastrointestinal: Negative.     Musculoskeletal: Negative for arthralgias, gait problem and myalgias. Skin:        Abscess-right axilla   Neurological: Negative for dizziness, tremors, seizures, weakness, light-headedness and headaches. Psychiatric/Behavioral: Negative for confusion, decreased concentration, dysphoric mood, hallucinations, self-injury, sleep disturbance and suicidal ideas. The patient is not nervous/anxious. Current Outpatient Medications on File Prior to Visit   Medication Sig Dispense Refill    sertraline (ZOLOFT) 50 MG tablet TAKE 1 TABLET BY MOUTH DAILY 90 tablet 0    ondansetron (ZOFRAN) 8 MG tablet Take 1 tablet by mouth every 8 hours as needed for nausea/vomiting associated with Migraine Headaches 20 tablet 2    metoprolol tartrate (LOPRESSOR) 50 MG tablet Take 1 tablet by mouth 2 times daily 180 tablet 1    omeprazole (PRILOSEC) 40 MG delayed release capsule Take 1 capsule by mouth every morning 90 capsule 1    ibuprofen (ADVIL;MOTRIN) 600 MG tablet Take 1 tablet by mouth every 6 hours as needed for Pain 30 tablet 0    SUMAtriptan (IMITREX) 50 MG tablet Take 1 tablet at onset of migraine headache. May take additional tablet one hour if no improvement. Do not take more than 2 tablets in 24 hours. 12 tablet 2    norethindrone-ethinyl estradiol-iron (LOESTRIN FE 1.5/30) 1.5-30 MG-MCG tablet Take 1 tablet by mouth daily 3 packet 3     No current facility-administered medications on file prior to visit. Allergies   Allergen Reactions    Penicillins Shortness Of Breath, Hives and Rash    Tylenol [Acetaminophen] Hives     Past Medical History:   Diagnosis Date    Polycystic ovarian syndrome 12/01/2019     No past surgical history on file.    Social History     Tobacco Use    Smoking status: Never Smoker    Smokeless tobacco: Never Used   Substance Use Topics    Alcohol use: Not Currently      Family History   Problem Relation Age of Onset    High Blood Pressure Mother     Heart Disease Father     High Blood Pressure Father     High Cholesterol Father     Other Father     Migraines Sister     Diabetes Maternal Grandmother     Diabetes Paternal Grandmother     Hypertension Paternal Grandmother     Diabetes Maternal Grandfather     Hypertension Maternal Grandfather     Cancer Maternal Grandfather         Bladder    Lung Cancer Paternal Grandfather         Vitals:    03/10/22 0940   BP: 116/72   Site: Left Upper Arm   Position: Sitting   Cuff Size: Medium Adult   Pulse: 92   Temp: 96.4 °F (35.8 °C)   SpO2: 97%   Weight: 187 lb 6.4 oz (85 kg)   Height: 5' 3\" (1.6 m)     Estimated body mass index is 33.2 kg/m² as calculated from the following:    Height as of this encounter: 5' 3\" (1.6 m). Weight as of this encounter: 187 lb 6.4 oz (85 kg). Physical Exam  Vitals and nursing note reviewed. Constitutional:       General: She is not in acute distress. Appearance: Normal appearance. Neck:      Vascular: No carotid bruit. Cardiovascular:      Rate and Rhythm: Normal rate and regular rhythm. Pulses: Normal pulses. Heart sounds: Normal heart sounds, S1 normal and S2 normal.   Pulmonary:      Effort: Pulmonary effort is normal.      Breath sounds: Normal breath sounds. Musculoskeletal:         General: Normal range of motion. Cervical back: Normal range of motion and neck supple. Right lower leg: No edema. Left lower leg: No edema. Lymphadenopathy:      Cervical: No cervical adenopathy. Skin:     General: Skin is warm. Findings: Abscess (Right axilla-healing. Tender. No active drainage) present. Neurological:      General: No focal deficit present. Mental Status: She is alert and oriented to person, place, and time. Psychiatric:         Attention and Perception: Attention normal.         Mood and Affect: Mood and affect normal.         Behavior: Behavior normal.         Thought Content:  Thought content normal. Thought content is not paranoid or delusional. Thought content does not include homicidal or suicidal ideation. Thought content does not include homicidal or suicidal plan. Cognition and Memory: Cognition normal.     Right axilla- 4 days ago prior to draining. Right axilla- 4 days ago prior to draining. Current abscess- right axilla        ASSESSMENT/PLAN:  1. Migraine with aura and without status migrainosus, not intractable  -Well-controlled  -Continue metoprolol tartrate (LOPRESSOR) 50 MG tablet; Take 1 tablet by mouth 2 times daily  Dispense: 180 tablet; Refill: 0  -Continue sumatriptan (Imitrex) 50 mg at onset of migraine  -Continue Zofran 8 mg as needed for nausea with migraine    2. Anxiety  -Improved  -Continue sertraline (ZOLOFT) 50 MG tablet; TAKE 1 TABLET BY MOUTH DAILY  Dispense: 90 tablet; Refill: 0    3. Gastroesophageal reflux disease without esophagitis  -Not well controlled  -Start pantoprazole (PROTONIX) 40 MG tablet; Take 1 tablet in the morning before first meal  Dispense: 90 tablet; Refill: 0  -Stop omeprazole 40 mg once daily    4. Abscess (right axilla)  -Healing  -Start mupirocin (BACTROBAN) 2 % ointment; Apply topically to boil(s) 3 times daily x 7 days  Dispense: 30 g; Refill: 0      Return in about 3 months (around 6/10/2022) for 3 month follow up of anxiety, migraine headaches, GI upset, boils. Discussed use, benefit, and side effects of prescribed medications. Patient's questions answered and concerns addressed. Patient agrees to plan of care. My scheduled days in the office reviewed with patient, and same day appointments available. Encouraged to use [employer] for communication as needed. Electronically signed by MARTA Tucker CNP on 3/10/2022 at 8:46 PM       This dictation was generated by voice recognition computer software. Although all attempts are made to edit the dictation for accuracy, there may be errors in the transcription that are not intended.

## 2022-03-10 NOTE — PATIENT INSTRUCTIONS
Patient Education        Skin Abscess: Care Instructions  Your Care Instructions     A skin abscess is a bacterial infection that forms a pocket of pus. A boil is a kind of skin abscess. The doctor may have cut an opening in the abscess so that the pus can drain out. You may have gauze in the cut so that the abscess will stay open and keep draining. You may need antibiotics. You will need to follow up with your doctor to make sure the infection has gone away. The doctor has checked you carefully, but problems can develop later. If you notice any problems or new symptoms, get medical treatment right away. Follow-up care is a key part of your treatment and safety. Be sure to make and go to all appointments, and call your doctor if you are having problems. It's also a good idea to know your test results and keep a list of the medicines you take. How can you care for yourself at home? · Apply warm and dry compresses, a heating pad set on low, or a hot water bottle 3 or 4 times a day for pain. Keep a cloth between the heat source and your skin. · If your doctor prescribed antibiotics, take them as directed. Do not stop taking them just because you feel better. You need to take the full course of antibiotics. · Take pain medicines exactly as directed. ? If the doctor gave you a prescription medicine for pain, take it as prescribed. ? If you are not taking a prescription pain medicine, ask your doctor if you can take an over-the-counter medicine. · Keep your bandage clean and dry. Change the bandage whenever it gets wet or dirty, or at least one time a day. · If the abscess was packed with gauze:  ? Keep follow-up appointments to have the gauze changed or removed. If the doctor instructed you to remove the gauze, follow the instructions you were given for how to remove it. ? After the gauze is removed, soak the area in warm water for 15 to 20 minutes 2 times a day, until the wound closes.   When should you call for help? Call your doctor now or seek immediate medical care if:    · You have signs of worsening infection, such as:  ? Increased pain, swelling, warmth, or redness. ? Red streaks leading from the infected skin. ? Pus draining from the wound. ? A fever. Watch closely for changes in your health, and be sure to contact your doctor if:    · You do not get better as expected. Where can you learn more? Go to https://[employer]aceuticalspepiceweb.Nuvilex. org and sign in to your [employer] account. Enter K098 in the BizSlate box to learn more about \"Skin Abscess: Care Instructions. \"     If you do not have an account, please click on the \"Sign Up Now\" link. Current as of: March 3, 2021               Content Version: 13.1  © 0887-4296 [employer]. Care instructions adapted under license by [facility] [place] ([facility]). If you have questions about a medical condition or this instruction, always ask your healthcare professional. [place] [address] any warranty or liability for your use of this information. Patient Education        Gas and Bloating: Care Instructions  Your Care Instructions     Gas and bloating can be uncomfortable and embarrassing problems. All people pass gas, but some people produce more gas than others, sometimes enough to cause distress. It is normal to pass gas from 6 to 20 times per day. Excess gas usually is not caused by a serious health problem. Gas and bloating usually are caused by something you eat or drink, including some food supplements and medicines. Gas and bloating are usually harmless and go away without treatment. However, changing your diet can help end the problem. Some over-the-counter medicines can help prevent gas and relieve bloating. Follow-up care is a key part of your treatment and safety. Be sure to make and go to all appointments, and call your doctor if you are having problems.  It's also a good idea to know your test results and keep a list of the medicines you take. How can you care for yourself at home? · Keep a food diary if you think a food gives you gas. Write down what you eat or drink. Also record when you get gas. If you notice that a food seems to cause your gas each time, avoid it and see if the gas goes away. Examples of foods that cause gas include:  ? Fried and fatty foods. ? Beans. ? Vegetables such as artichokes, asparagus, broccoli, brussels sprouts, cabbage, cauliflower, cucumbers, green peppers, onions, peas, radishes, and raw potatoes. ? Fruits such as apricots, bananas, melons, peaches, pears, prunes, and raw apples. ? Wheat and wheat bran. · Soak dry beans in water overnight, then dump the water and cook the soaked beans in new water. This can help prevent gas and bloating. · If you have problems with lactose, avoid dairy products such as milk and cheese. · Try not to swallow air. Do not drink through a straw, gulp your food, or chew gum. · Take an over-the-counter medicine. Read and follow all instructions on the label. ? Food enzymes, such as Beano, can be added to gas-producing foods to prevent gas. ? Antacids, such as Maalox Anti-Gas and Mylanta Gas, can relieve bloating by making you burp. Be careful when you take over-the-counter antacid medicines. Many of these medicines have aspirin in them. Read the label to make sure that you are not taking more than the recommended dose. Too much aspirin can be harmful. ? Activated charcoal tablets, such as CharcoCaps, may decrease odor from gas you pass. ? If you have problems with lactose, you can take medicines such as Dairy Ease and Lactaid with dairy products to prevent gas and bloating. · Get some exercise regularly. When should you call for help? Call 911 anytime you think you may need emergency care. For example, call if:    · You have gas and signs of a heart attack, such as:  ? Chest pain or pressure. ? Sweating. ? Shortness of breath.   ? Nausea or vomiting. ? Pain that spreads from the chest to the neck, jaw, or one or both shoulders or arms. ? Dizziness or lightheadedness. ? A fast or uneven pulse. After calling 911, chew 1 adult-strength aspirin. Wait for an ambulance. Do not try to drive yourself. Call your doctor now or seek immediate medical care if:    · You have severe belly pain.     · You have blood in your stool. Watch closely for changes in your health, and be sure to contact your doctor if:    · You have blood or pus in your urine.     · Your urine is cloudy or smells bad.     · You are burping and have trouble swallowing.     · You feel bloated and have swelling in your belly.     · You do not get better as expected. Where can you learn more? Go to https://[employer] CompanypeBlueflyeb.Juxinli. org and sign in to your [employer] account. Enter W763 in the Retailigence box to learn more about \"Gas and Bloating: Care Instructions. \"     If you do not have an account, please click on the \"Sign Up Now\" link. Current as of: July 1, 2021               Content Version: 13.1  © 2601-2466 [employer]. Care instructions adapted under license by [facility] ([facility]). If you have questions about a medical condition or this instruction, always ask your healthcare professional. [address] any warranty or liability for your use of this information.

## 2022-03-31 ENCOUNTER — PATIENT MESSAGE (OUTPATIENT)
Dept: PRIMARY CARE CLINIC | Age: 27
End: 2022-03-31

## 2022-03-31 DIAGNOSIS — L02.92 BOIL: Primary | ICD-10-CM

## 2022-03-31 RX ORDER — SULFAMETHOXAZOLE AND TRIMETHOPRIM 400; 80 MG/1; MG/1
1 TABLET ORAL 2 TIMES DAILY
Qty: 20 TABLET | Refills: 0 | Status: SHIPPED | OUTPATIENT
Start: 2022-03-31 | End: 2022-04-10

## 2022-03-31 NOTE — TELEPHONE ENCOUNTER
From: Jose Dewey  To: Franco Escobedo  Sent: 3/31/2022 2:33 PM EDT  Subject: Asaddionisio Navarretea? So I know we talked at my last appointment about the boil under my arm, Ive been using the stuff you prescribed me but I have another small one under the same arm and I got one again in a place that is very uncomfortable and the stuff doesnt seem to be helping either one. I dont know what to do to help them at this point, they just hurt so bad.

## 2022-03-31 NOTE — TELEPHONE ENCOUNTER
1. Boil  - sulfamethoxazole-trimethoprim (BACTRIM) 400-80 MG per tablet; Take 1 tablet by mouth 2 times daily for 10 days  Dispense: 20 tablet;  Refill: 0

## 2022-04-14 DIAGNOSIS — F41.9 ANXIETY: ICD-10-CM

## 2022-04-14 DIAGNOSIS — G43.109 MIGRAINE WITH AURA AND WITHOUT STATUS MIGRAINOSUS, NOT INTRACTABLE: ICD-10-CM

## 2022-04-14 RX ORDER — ONDANSETRON HYDROCHLORIDE 8 MG/1
TABLET, FILM COATED ORAL
Qty: 20 TABLET | Refills: 2 | OUTPATIENT
Start: 2022-04-14

## 2022-04-14 NOTE — TELEPHONE ENCOUNTER
Recent Visits  Date Type Provider Dept   03/10/22 Office Visit Murry Goodell, APRN - CNP [facility]   12/17/21 Office Visit Murry Goodell, APRN - [facility]   11/19/21 Office Visit Murry Goodell, APRN - [facility]   07/19/21 Office Visit Murry Goodell, APRN - [facility]   06/03/21 Office Visit Murry Goodell, APRN - [facility]   04/30/21 Office Visit Murry Goodell, APRN - [facility]   Showing recent visits within past 540 days with a meds authorizing provider and meeting all other requirements  Future Appointments  Date Type Provider Dept   06/10/22 Appointment Murry Goodell, APRN - CNP [facility]   Showing future appointments within next 150 days with a meds authorizing provider and meeting all other requirements

## 2022-04-21 ENCOUNTER — PATIENT MESSAGE (OUTPATIENT)
Dept: PRIMARY CARE CLINIC | Age: 27
End: 2022-04-21

## 2022-04-22 NOTE — TELEPHONE ENCOUNTER
From: Mayo Shah  To: Bobby Howe  Sent: 4/21/2022 9:00 PM EDT  Subject: Hand    I just wanted to give you a heads up that my right hand is hurting super bad lately, like to the point that I cant pick anything up with it.  Nayeli Darvin been wearing my brace and taking ibuprofen and icing it like I should and nothing seems to be helping

## 2022-05-16 ENCOUNTER — PATIENT MESSAGE (OUTPATIENT)
Dept: PRIMARY CARE CLINIC | Age: 27
End: 2022-05-16

## 2022-05-16 DIAGNOSIS — Z32.01 POSITIVE PREGNANCY TEST: Primary | ICD-10-CM

## 2022-05-16 NOTE — TELEPHONE ENCOUNTER
From: Baljeet Rod  To: Asia Ha  Sent: 5/16/2022 6:29 AM EDT  Subject: Just a question     I took two pregnancy tests lasts last night and they both came out positive, a very dark positive, now this is all new to me and I honestly dont know what the next step is and I was hoping you could point me in the right direction!

## 2022-06-01 LAB
HEP B, EXTERNAL RESULT: NEGATIVE
HEPATITIS C ANTIBODY, EXTERNAL RESULT: NEGATIVE
RUBELLA TITER, EXTERNAL RESULT: NORMAL

## 2022-06-09 ENCOUNTER — TELEPHONE (OUTPATIENT)
Dept: PRIMARY CARE CLINIC | Age: 27
End: 2022-06-09

## 2022-06-09 DIAGNOSIS — K21.9 GASTROESOPHAGEAL REFLUX DISEASE WITHOUT ESOPHAGITIS: ICD-10-CM

## 2022-06-09 RX ORDER — PANTOPRAZOLE SODIUM 40 MG/1
TABLET, DELAYED RELEASE ORAL
Qty: 90 TABLET | Refills: 0 | Status: CANCELLED | OUTPATIENT
Start: 2022-06-09

## 2022-06-15 DIAGNOSIS — K21.9 GASTROESOPHAGEAL REFLUX DISEASE WITHOUT ESOPHAGITIS: ICD-10-CM

## 2022-06-15 RX ORDER — PANTOPRAZOLE SODIUM 40 MG/1
TABLET, DELAYED RELEASE ORAL
Qty: 90 TABLET | Refills: 0 | Status: SHIPPED | OUTPATIENT
Start: 2022-06-15 | End: 2022-07-13 | Stop reason: SDUPTHER

## 2022-06-15 NOTE — TELEPHONE ENCOUNTER
Requested Prescriptions     Pending Prescriptions Disp Refills    pantoprazole (PROTONIX) 40 MG tablet 90 tablet 0     Sig: Take 1 tablet in the morning before first meal       Last OV 3/10/22. Next OV None pt should it schedule a follow per your notes on 3/10/22    Last refill 3/10/22.   Last labs 12/29/21 just a CBC

## 2022-07-13 ENCOUNTER — PATIENT MESSAGE (OUTPATIENT)
Dept: PRIMARY CARE CLINIC | Age: 27
End: 2022-07-13

## 2022-07-13 DIAGNOSIS — F41.9 ANXIETY: ICD-10-CM

## 2022-07-13 DIAGNOSIS — K21.9 GASTROESOPHAGEAL REFLUX DISEASE WITHOUT ESOPHAGITIS: ICD-10-CM

## 2022-07-13 RX ORDER — PANTOPRAZOLE SODIUM 40 MG/1
TABLET, DELAYED RELEASE ORAL
Qty: 30 TABLET | Refills: 0 | Status: SHIPPED | OUTPATIENT
Start: 2022-07-13 | End: 2022-08-01 | Stop reason: SDUPTHER

## 2022-07-13 NOTE — TELEPHONE ENCOUNTER
1. Gastroesophageal reflux disease without esophagitis  - pantoprazole (PROTONIX) 40 MG tablet; Take 1 tablet in the morning before first meal  Dispense: 30 tablet; Refill: 0    2. Anxiety  - sertraline (ZOLOFT) 50 MG tablet; TAKE 1 TABLET BY MOUTH DAILY  Dispense: 30 tablet;  Refill: 0

## 2022-07-13 NOTE — TELEPHONE ENCOUNTER
Requested Prescriptions     Pending Prescriptions Disp Refills    pantoprazole (PROTONIX) 40 MG tablet 10 tablet 0     Sig: Take 1 tablet in the morning before first meal    sertraline (ZOLOFT) 50 MG tablet 10 tablet 0     Sig: TAKE 1 TABLET BY MOUTH DAILY       Last OV 3/10/22, last notes:   Return in about 3 months (around 6/10/2022) for 3 month follow up of anxiety, migraine headaches, GI upset, boils, she never came back. Next OV 7/22/22. Last refill 3/10/22 & 6/15/22. Last labs 12/29/22. Pt has an appointment on 7/22/22 and no medication I pended to you 10 pills, so she can have some meds until her appointment, spoke and explained to the pt, she expressed understanding.

## 2022-07-13 NOTE — TELEPHONE ENCOUNTER
From: Greg Gaston  To: Salas Artist  Sent: 7/13/2022 11:46 AM EDT  Subject: Question     I know I was supposed to have a follow up on my medicine and I missed that appointment unfortunately, is there anyway that you could send me a refill for my Protonix and my Zoloft?  Im completely out of Protonix and on my last couple of zoloft

## 2022-08-01 ENCOUNTER — OFFICE VISIT (OUTPATIENT)
Dept: PRIMARY CARE CLINIC | Age: 27
End: 2022-08-01
Payer: COMMERCIAL

## 2022-08-01 VITALS
BODY MASS INDEX: 33.13 KG/M2 | HEIGHT: 63 IN | HEART RATE: 88 BPM | SYSTOLIC BLOOD PRESSURE: 126 MMHG | OXYGEN SATURATION: 100 % | WEIGHT: 187 LBS | DIASTOLIC BLOOD PRESSURE: 82 MMHG

## 2022-08-01 DIAGNOSIS — F41.9 ANXIETY: Primary | ICD-10-CM

## 2022-08-01 DIAGNOSIS — G43.109 MIGRAINE WITH AURA AND WITHOUT STATUS MIGRAINOSUS, NOT INTRACTABLE: ICD-10-CM

## 2022-08-01 DIAGNOSIS — Z3A.15 15 WEEKS GESTATION OF PREGNANCY: ICD-10-CM

## 2022-08-01 DIAGNOSIS — K21.9 GASTROESOPHAGEAL REFLUX DISEASE WITHOUT ESOPHAGITIS: ICD-10-CM

## 2022-08-01 PROCEDURE — 99214 OFFICE O/P EST MOD 30 MIN: CPT | Performed by: NURSE PRACTITIONER

## 2022-08-01 RX ORDER — PANTOPRAZOLE SODIUM 40 MG/1
TABLET, DELAYED RELEASE ORAL
Qty: 90 TABLET | Refills: 0 | Status: SHIPPED | OUTPATIENT
Start: 2022-08-01

## 2022-08-01 RX ORDER — METOPROLOL TARTRATE 50 MG/1
50 TABLET, FILM COATED ORAL 2 TIMES DAILY
Qty: 180 TABLET | Refills: 0 | Status: SHIPPED | OUTPATIENT
Start: 2022-08-01 | End: 2022-11-04

## 2022-08-01 ASSESSMENT — ANXIETY QUESTIONNAIRES
3. WORRYING TOO MUCH ABOUT DIFFERENT THINGS: 0
6. BECOMING EASILY ANNOYED OR IRRITABLE: 0
4. TROUBLE RELAXING: 0
1. FEELING NERVOUS, ANXIOUS, OR ON EDGE: 0
7. FEELING AFRAID AS IF SOMETHING AWFUL MIGHT HAPPEN: 0
2. NOT BEING ABLE TO STOP OR CONTROL WORRYING: 0
IF YOU CHECKED OFF ANY PROBLEMS ON THIS QUESTIONNAIRE, HOW DIFFICULT HAVE THESE PROBLEMS MADE IT FOR YOU TO DO YOUR WORK, TAKE CARE OF THINGS AT HOME, OR GET ALONG WITH OTHER PEOPLE: NOT DIFFICULT AT ALL
GAD7 TOTAL SCORE: 0
5. BEING SO RESTLESS THAT IT IS HARD TO SIT STILL: 0

## 2022-08-01 ASSESSMENT — ENCOUNTER SYMPTOMS
SHORTNESS OF BREATH: 0
ABDOMINAL DISTENTION: 0
GASTROINTESTINAL NEGATIVE: 1
ABDOMINAL PAIN: 0
CONSTIPATION: 0
CHEST TIGHTNESS: 0
DIARRHEA: 0
VOMITING: 0
NAUSEA: 0

## 2022-08-01 ASSESSMENT — PATIENT HEALTH QUESTIONNAIRE - PHQ9
SUM OF ALL RESPONSES TO PHQ QUESTIONS 1-9: 0
2. FEELING DOWN, DEPRESSED OR HOPELESS: 0
SUM OF ALL RESPONSES TO PHQ QUESTIONS 1-9: 0
SUM OF ALL RESPONSES TO PHQ9 QUESTIONS 1 & 2: 0
1. LITTLE INTEREST OR PLEASURE IN DOING THINGS: 0
SUM OF ALL RESPONSES TO PHQ QUESTIONS 1-9: 0
SUM OF ALL RESPONSES TO PHQ QUESTIONS 1-9: 0

## 2022-08-01 NOTE — PROGRESS NOTES
8/1/2022    Chief Complaint   Patient presents with    Anxiety     Follow up on medication         Alexandria Lomax is a 23 y.o. female, presents today for 3 month follow up of Anxiety, Migraine Headaches, GERD. HPI   Anxiety  Current medication: Zoloft 50 mg daily. Greatly improved with medication. Denies anxiety. Taking medications as prescribed and tolerating well. She denies medication side effects. She denies homicidal and suicidal ideation and intent. Note Zoloft approved by OB/GYN to take during pregnancy. CARLOS 7 SCORE 8/1/2022   CARLOS-7 Total Score 0     Interpretation of CARLOS-7 score: 5-9 = mild anxiety, 10-14 = moderate anxiety, 15+ = severe anxiety. Recommend referral to behavioral health for scores 10 or greater. PHQ Scores 8/1/2022 3/10/2022 12/17/2021 2/10/2021   PHQ2 Score 0 0 0 0   PHQ9 Score 0 0 0 0     Interpretation of Total Score Depression Severity: 1-4 = Minimal depression, 5-9 = Mild depression, 10-14 = Moderate depression, 15-19 = Moderately severe depression, 20-27 = Severe depression     Migraine headaches  Headaches have decreased in frequency with taking metoprolol (Lopressor) 50 mg twice daily. She is taking as prescribed and tolerating well. Denies side effects from medication. She reports 4  \"mild \"migraine headaches in the past month., \"able to function through them\". She is no longer taking Imitrex and ibuprofen at onset of headaches due to pregnancy. GERD  Patient is taking Protonix 40 mg daily. Denies heartburn with pregnancy. No longer eating spicy foods and BBQ sauce due to heartburn. - Previous PPI includes Omeprazole. Patient is pregnant  15 weeks gestation (Boy). Due 1/23/23. Followed by OB/GYN- She has seen [facility] She reports prenatal visit once monthly. Last appointment was 5 days ago. Next appointment is 8/24/2022. Pregnancy going well. Denies abdominal pain, vaginal bleeding.     Current Outpatient Medications   Medication Sig Dispense Refill    Prenatal Vit-Fe Fumarate-FA (PRENATAL VITAMIN PO) Take 1 tablet by mouth daily      pantoprazole (PROTONIX) 40 MG tablet Take 1 tablet in the morning before first meal 90 tablet 0    metoprolol tartrate (LOPRESSOR) 50 MG tablet Take 1 tablet by mouth in the morning and 1 tablet before bedtime. 180 tablet 0    sertraline (ZOLOFT) 50 MG tablet TAKE 1 TABLET BY MOUTH DAILY 90 tablet 0     No current facility-administered medications for this visit. Review of Systems   Constitutional:  Negative for diaphoresis and fatigue. Respiratory:  Negative for chest tightness and shortness of breath. Cardiovascular:  Negative for chest pain, palpitations and leg swelling. Gastrointestinal: Negative. Negative for abdominal distention, abdominal pain, constipation, diarrhea, nausea and vomiting. Musculoskeletal:  Negative for arthralgias, gait problem and myalgias. Skin: Negative. Neurological:  Positive for headaches (improved). Negative for dizziness, tremors, seizures, weakness and light-headedness. Psychiatric/Behavioral:  Negative for confusion, decreased concentration, dysphoric mood, hallucinations, self-injury, sleep disturbance and suicidal ideas. The patient is nervous/anxious (improved). Allergies   Allergen Reactions    Penicillins Shortness Of Breath, Hives and Rash    Tylenol [Acetaminophen] Hives     Past Medical History:   Diagnosis Date    Polycystic ovarian syndrome 12/01/2019     No past surgical history on file.    Social History     Tobacco Use    Smoking status: Never    Smokeless tobacco: Never   Substance Use Topics    Alcohol use: Not Currently      Family History   Problem Relation Age of Onset    High Blood Pressure Mother     Heart Disease Father     High Blood Pressure Father     High Cholesterol Father     Other Father     Migraines Sister     Diabetes Maternal Grandmother     Diabetes Paternal Grandmother Hypertension Paternal Grandmother     Diabetes Maternal Grandfather     Hypertension Maternal Grandfather     Cancer Maternal Grandfather         Bladder    Lung Cancer Paternal Grandfather         Vitals:    08/01/22 1504   BP: 126/82   Site: Left Upper Arm   Position: Sitting   Cuff Size: Medium Adult   Pulse: 88   SpO2: 100%   Weight: 187 lb (84.8 kg)   Height: 5' 3\" (1.6 m)       Estimated body mass index is 33.13 kg/m² as calculated from the following:    Height as of this encounter: 5' 3\" (1.6 m). Weight as of this encounter: 187 lb (84.8 kg). Physical Exam  Vitals and nursing note reviewed. Constitutional:       General: She is not in acute distress. Appearance: Normal appearance. She is well-developed. Neck:      Vascular: No carotid bruit. Cardiovascular:      Rate and Rhythm: Normal rate and regular rhythm. Pulses: Normal pulses. Heart sounds: Normal heart sounds, S1 normal and S2 normal.   Pulmonary:      Effort: Pulmonary effort is normal.      Breath sounds: Normal breath sounds. Musculoskeletal:         General: Normal range of motion. Cervical back: Normal range of motion and neck supple. Right lower leg: No edema. Left lower leg: No edema. Lymphadenopathy:      Cervical: No cervical adenopathy. Skin:     General: Skin is warm. Neurological:      General: No focal deficit present. Mental Status: She is alert and oriented to person, place, and time. Psychiatric:         Attention and Perception: Attention normal.         Mood and Affect: Mood and affect normal.         Speech: Speech normal.         Behavior: Behavior normal.         Thought Content: Thought content normal. Thought content is not paranoid or delusional. Thought content does not include homicidal or suicidal ideation. Thought content does not include homicidal or suicidal plan.          Cognition and Memory: Cognition normal.         1. Anxiety  - In remission  - Continue sertraline (ZOLOFT) 50 MG tablet; TAKE 1 TABLET BY MOUTH DAILY  Dispense: 90 tablet; Refill: 0    2. Migraine with aura and without status migrainosus, not intractable  - Stable  -Continue metoprolol tartrate (LOPRESSOR) 50 MG tablet; Take 1 tablet by mouth in the morning and 1 tablet before bedtime. Dispense: 180 tablet; Refill: 0  -Continue to avoid Imitrex and ibuprofen. 3. Gastroesophageal reflux disease without esophagitis  -Controlled  -Continue pantoprazole (PROTONIX) 40 MG tablet; Take 1 tablet in the morning before first meal  Dispense: 90 tablet; Refill: 0    4. 15 weeks gestation of pregnancy  - Reports no complications  - Continue regularly scheduled OB/GYN appointments. Return in about 3 months (around 11/1/2022) for 3 month follow up of Anxiety, Migraine Headaches, GERD. Gurpreet Dia Discussed use, benefit, and side effects of prescribed medications. Patient's questions answered and concerns addressed. Patient agrees to plan of care. My scheduled days in the office reviewed with patient, and same day appointments available. Encouraged to use [employer] for communication as needed. Electronically signed by MARTA Tomas CNP on 8/1/2022 at 3:36 PM       This dictation was generated by voice recognition computer software. Although all attempts are made to edit the dictation for accuracy, there may be errors in the transcription that are not intended.

## 2022-08-03 ENCOUNTER — PATIENT MESSAGE (OUTPATIENT)
Dept: PRIMARY CARE CLINIC | Age: 27
End: 2022-08-03

## 2022-08-03 DIAGNOSIS — R06.2 WHEEZING: Primary | ICD-10-CM

## 2022-08-04 NOTE — TELEPHONE ENCOUNTER
From: Buck Richey  To: Farida Riverton  Sent: 8/3/2022 9:30 PM EDT  Subject: Caesar Loud, so the small cough I had on Monday has turned into an almost all the time cough that comes with a lot of wheezing. Zoltan done 3 covid tests and all came back negative. What can I do to help with the wheezing?

## 2022-08-05 RX ORDER — ALBUTEROL SULFATE 90 UG/1
2 AEROSOL, METERED RESPIRATORY (INHALATION) 4 TIMES DAILY PRN
Qty: 18 G | Refills: 5 | Status: SHIPPED | OUTPATIENT
Start: 2022-08-05

## 2022-08-05 NOTE — TELEPHONE ENCOUNTER
1. Wheezing  - albuterol sulfate HFA (VENTOLIN HFA) 108 (90 Base) MCG/ACT inhaler;  Inhale 2 puffs into the lungs 4 times daily as needed for Wheezing  Dispense: 18 g; Refill: 5

## 2022-08-17 RX ORDER — ONDANSETRON 4 MG/1
8 TABLET, FILM COATED ORAL DAILY PRN
Qty: 30 TABLET | Refills: 0 | Status: SHIPPED | OUTPATIENT
Start: 2022-08-17

## 2022-08-17 NOTE — TELEPHONE ENCOUNTER
Requested Prescriptions     Pending Prescriptions Disp Refills    ondansetron (ZOFRAN) 4 MG tablet 30 tablet 0     Sig: Take 2 tablets by mouth daily as needed for Nausea or Vomiting       Last OV: 8/1/22. Next OV: 11/4/22. Last refill: 12/17/21. Last labs: 12/29/21.

## 2022-09-23 ENCOUNTER — OFFICE VISIT (OUTPATIENT)
Dept: PRIMARY CARE CLINIC | Age: 27
End: 2022-09-23
Payer: COMMERCIAL

## 2022-09-23 VITALS
WEIGHT: 189 LBS | OXYGEN SATURATION: 98 % | HEART RATE: 112 BPM | DIASTOLIC BLOOD PRESSURE: 64 MMHG | SYSTOLIC BLOOD PRESSURE: 120 MMHG | BODY MASS INDEX: 33.48 KG/M2

## 2022-09-23 DIAGNOSIS — H65.01 NON-RECURRENT ACUTE SEROUS OTITIS MEDIA OF RIGHT EAR: Primary | ICD-10-CM

## 2022-09-23 DIAGNOSIS — Z3A.22 22 WEEKS GESTATION OF PREGNANCY: ICD-10-CM

## 2022-09-23 PROCEDURE — 99214 OFFICE O/P EST MOD 30 MIN: CPT | Performed by: NURSE PRACTITIONER

## 2022-09-23 RX ORDER — PROGESTERONE 200 MG/1
200 CAPSULE ORAL NIGHTLY
COMMUNITY
Start: 2022-09-15

## 2022-09-23 ASSESSMENT — ENCOUNTER SYMPTOMS
RHINORRHEA: 0
COUGH: 0
WHEEZING: 0
ABDOMINAL PAIN: 0
APNEA: 0
TROUBLE SWALLOWING: 0
SORE THROAT: 0
SHORTNESS OF BREATH: 0
SINUS PRESSURE: 0

## 2022-09-23 ASSESSMENT — PATIENT HEALTH QUESTIONNAIRE - PHQ9
SUM OF ALL RESPONSES TO PHQ QUESTIONS 1-9: 0
2. FEELING DOWN, DEPRESSED OR HOPELESS: 0
SUM OF ALL RESPONSES TO PHQ QUESTIONS 1-9: 0
SUM OF ALL RESPONSES TO PHQ QUESTIONS 1-9: 0
1. LITTLE INTEREST OR PLEASURE IN DOING THINGS: 0
SUM OF ALL RESPONSES TO PHQ9 QUESTIONS 1 & 2: 0
SUM OF ALL RESPONSES TO PHQ QUESTIONS 1-9: 0

## 2022-09-23 NOTE — PROGRESS NOTES
9/23/2022    Chief Complaint   Patient presents with    Otalgia     C/o moderate to severe pain in the right ear x 1 day. Pt is pregnant         Edward Jenkins is a 33 y.o. female, presents today for evaluation of ear pain    HPI  Otalgia   There is pain in the right ear. This is a new problem. Episode onset: yesterday morning. The problem occurs constantly. The problem has been gradually improving (ear is painful, but not as painful as yesterday). There has been no fever. The pain is at a severity of 6/10. The pain is mild. Pertinent negatives include no abdominal pain, coughing, ear discharge, headaches, hearing loss, rash, rhinorrhea or sore throat. She has tried nothing (allergy to Tylenol, NSAIDS contraindicated with pregnancy) for the symptoms. There is no history of a chronic ear infection, hearing loss or a tympanostomy tube. Pregnancy  Patient is 22 weeks pregnant. Due date is 1/22/23  She is seeing OB/GYN weekly with ultrasounds. She reports pregnancy is progressing well, however cervix was 1 cm dilated at 20 week ultrasound/appointment. She was hospitalized from 9/7-9/9 at [facility] DR ANGELINE AGRAWAL in high risk unit and cerclage done. She is inserting progesterone vagianally every night as prescribed. She is off work until Nov. Patient reports she is feeling good, other than current ear pain. Blood pressure has been excellent throughout pregnancy. Baby is active to baseline. She denies abdominal pain, abdominal cramping, contractions, vaginal bleeding. Review of Systems   Constitutional:  Negative for activity change, appetite change, fatigue and unexpected weight change. HENT:  Positive for ear pain (right). Negative for congestion, ear discharge, hearing loss, postnasal drip, rhinorrhea, sinus pressure, sneezing, sore throat and trouble swallowing. Respiratory:  Negative for apnea, cough, shortness of breath and wheezing. Cardiovascular: Negative.     Gastrointestinal:  Negative for abdominal pain. Genitourinary:  Negative for pelvic pain, vaginal bleeding, vaginal discharge and vaginal pain. Musculoskeletal: Negative. Skin: Negative. Negative for rash. Neurological:  Negative for headaches. Current Outpatient Medications on File Prior to Visit   Medication Sig Dispense Refill    ondansetron (ZOFRAN) 4 MG tablet Take 2 tablets by mouth daily as needed for Nausea or Vomiting 30 tablet 0    albuterol sulfate HFA (VENTOLIN HFA) 108 (90 Base) MCG/ACT inhaler Inhale 2 puffs into the lungs 4 times daily as needed for Wheezing 18 g 5    pantoprazole (PROTONIX) 40 MG tablet Take 1 tablet in the morning before first meal 90 tablet 0    metoprolol tartrate (LOPRESSOR) 50 MG tablet Take 1 tablet by mouth in the morning and 1 tablet before bedtime. 180 tablet 0    sertraline (ZOLOFT) 50 MG tablet TAKE 1 TABLET BY MOUTH DAILY 90 tablet 0    Prenatal Vit-Fe Fumarate-FA (PRENATAL VITAMIN PO) Take 1 tablet by mouth daily       No current facility-administered medications on file prior to visit. Allergies   Allergen Reactions    Penicillins Shortness Of Breath, Hives and Rash    Tylenol [Acetaminophen] Hives     Past Medical History:   Diagnosis Date    Polycystic ovarian syndrome 12/01/2019     History reviewed. No pertinent surgical history.    Social History     Tobacco Use    Smoking status: Never    Smokeless tobacco: Never   Substance Use Topics    Alcohol use: Not Currently      Family History   Problem Relation Age of Onset    High Blood Pressure Mother     Heart Disease Father     High Blood Pressure Father     High Cholesterol Father     Other Father     Migraines Sister     Diabetes Maternal Grandmother     Diabetes Paternal Grandmother     Hypertension Paternal Grandmother     Diabetes Maternal Grandfather     Hypertension Maternal Grandfather     Cancer Maternal Grandfather         Bladder    Lung Cancer Paternal Grandfather         Vitals:    09/23/22 1017   BP: 120/64   Pulse: (!) 112   SpO2: 98%   Weight: 189 lb (85.7 kg)     Estimated body mass index is 33.48 kg/m² as calculated from the following:    Height as of 8/1/22: 5' 3\" (1.6 m). Weight as of this encounter: 189 lb (85.7 kg). Physical Exam  Vitals and nursing note reviewed. Constitutional:       General: She is not in acute distress. Appearance: Normal appearance. HENT:      Head: Normocephalic. Right Ear: Hearing, ear canal and external ear normal. Tenderness present. No swelling. A middle ear effusion is present. There is no impacted cerumen. No mastoid tenderness. Tympanic membrane is not injected, scarred, perforated, erythematous, retracted or bulging. Left Ear: Hearing, tympanic membrane, ear canal and external ear normal.      Nose: Nose normal.      Mouth/Throat:      Mouth: Mucous membranes are moist.      Pharynx: Oropharynx is clear. Cardiovascular:      Rate and Rhythm: Normal rate and regular rhythm. Pulses: Normal pulses. Heart sounds: Normal heart sounds, S1 normal and S2 normal.   Pulmonary:      Effort: Pulmonary effort is normal.      Breath sounds: Normal breath sounds. Musculoskeletal:         General: Normal range of motion. Cervical back: Normal range of motion and neck supple. Right lower leg: No edema. Left lower leg: No edema. Lymphadenopathy:      Cervical: No cervical adenopathy. Skin:     General: Skin is warm. Neurological:      General: No focal deficit present. Mental Status: She is alert and oriented to person, place, and time. Psychiatric:         Mood and Affect: Mood normal.         Behavior: Behavior normal.         Thought Content: Thought content normal.       ASSESSMENT/PLAN:  1. Non-recurrent acute serous otitis media of right ear  - Acute  - Avoid NSAIDS, contraindicated in pregnancy after 20 weeks. - Heating pad for discomfort.        2. 22 weeks gestation of pregnancy  - Closely followed by OB/GYN  - Taking prenatal vitamins  - Weekly ultrasounds  - Off work until Nov with cerclage  - Immediately reports urgent/emergent pregnancy symptoms to OB/GYN      Return if symptoms worsen or fail to improve. Discussed use, benefit, and side effects of prescribed medications. Patient's questions answered and concerns addressed. Patient agrees to plan of care. My scheduled days in the office reviewed with patient, and same day appointments available. Encouraged to use [employer] for communication as needed. Electronically signed by MARTA Unger CNP on 9/23/2022 at 10:44 AM       This dictation was generated by voice recognition computer software. Although all attempts are made to edit the dictation for accuracy, there may be errors in the transcription that are not intended.

## 2022-11-04 ENCOUNTER — OFFICE VISIT (OUTPATIENT)
Dept: PRIMARY CARE CLINIC | Age: 27
End: 2022-11-04
Payer: COMMERCIAL

## 2022-11-04 VITALS
SYSTOLIC BLOOD PRESSURE: 132 MMHG | WEIGHT: 202 LBS | DIASTOLIC BLOOD PRESSURE: 74 MMHG | OXYGEN SATURATION: 97 % | HEIGHT: 63 IN | HEART RATE: 90 BPM | BODY MASS INDEX: 35.79 KG/M2 | TEMPERATURE: 97.2 F

## 2022-11-04 DIAGNOSIS — K21.9 GASTROESOPHAGEAL REFLUX DISEASE WITHOUT ESOPHAGITIS: ICD-10-CM

## 2022-11-04 DIAGNOSIS — Z3A.28 28 WEEKS GESTATION OF PREGNANCY: ICD-10-CM

## 2022-11-04 DIAGNOSIS — G43.109 MIGRAINE WITH AURA AND WITHOUT STATUS MIGRAINOSUS, NOT INTRACTABLE: ICD-10-CM

## 2022-11-04 DIAGNOSIS — F41.9 ANXIETY: Primary | ICD-10-CM

## 2022-11-04 PROCEDURE — 99214 OFFICE O/P EST MOD 30 MIN: CPT | Performed by: NURSE PRACTITIONER

## 2022-11-04 RX ORDER — METOPROLOL TARTRATE 50 MG/1
50 TABLET, FILM COATED ORAL 2 TIMES DAILY
Qty: 180 TABLET | Refills: 0 | Status: CANCELLED | OUTPATIENT
Start: 2022-11-04 | End: 2023-02-02

## 2022-11-04 RX ORDER — PANTOPRAZOLE SODIUM 40 MG/1
TABLET, DELAYED RELEASE ORAL
Qty: 90 TABLET | Refills: 0 | Status: CANCELLED | OUTPATIENT
Start: 2022-11-04

## 2022-11-04 ASSESSMENT — ANXIETY QUESTIONNAIRES
4. TROUBLE RELAXING: 1
7. FEELING AFRAID AS IF SOMETHING AWFUL MIGHT HAPPEN: 0
2. NOT BEING ABLE TO STOP OR CONTROL WORRYING: 0
IF YOU CHECKED OFF ANY PROBLEMS ON THIS QUESTIONNAIRE, HOW DIFFICULT HAVE THESE PROBLEMS MADE IT FOR YOU TO DO YOUR WORK, TAKE CARE OF THINGS AT HOME, OR GET ALONG WITH OTHER PEOPLE: NOT DIFFICULT AT ALL
6. BECOMING EASILY ANNOYED OR IRRITABLE: 0
3. WORRYING TOO MUCH ABOUT DIFFERENT THINGS: 1
GAD7 TOTAL SCORE: 2
5. BEING SO RESTLESS THAT IT IS HARD TO SIT STILL: 0
1. FEELING NERVOUS, ANXIOUS, OR ON EDGE: 0

## 2022-11-04 ASSESSMENT — PATIENT HEALTH QUESTIONNAIRE - PHQ9
3. TROUBLE FALLING OR STAYING ASLEEP: 2
6. FEELING BAD ABOUT YOURSELF - OR THAT YOU ARE A FAILURE OR HAVE LET YOURSELF OR YOUR FAMILY DOWN: 1
10. IF YOU CHECKED OFF ANY PROBLEMS, HOW DIFFICULT HAVE THESE PROBLEMS MADE IT FOR YOU TO DO YOUR WORK, TAKE CARE OF THINGS AT HOME, OR GET ALONG WITH OTHER PEOPLE: 0
8. MOVING OR SPEAKING SO SLOWLY THAT OTHER PEOPLE COULD HAVE NOTICED. OR THE OPPOSITE, BEING SO FIGETY OR RESTLESS THAT YOU HAVE BEEN MOVING AROUND A LOT MORE THAN USUAL: 0
4. FEELING TIRED OR HAVING LITTLE ENERGY: 1
1. LITTLE INTEREST OR PLEASURE IN DOING THINGS: 1
SUM OF ALL RESPONSES TO PHQ QUESTIONS 1-9: 6
SUM OF ALL RESPONSES TO PHQ QUESTIONS 1-9: 6
9. THOUGHTS THAT YOU WOULD BE BETTER OFF DEAD, OR OF HURTING YOURSELF: 0
5. POOR APPETITE OR OVEREATING: 0
SUM OF ALL RESPONSES TO PHQ9 QUESTIONS 1 & 2: 2
SUM OF ALL RESPONSES TO PHQ QUESTIONS 1-9: 6
2. FEELING DOWN, DEPRESSED OR HOPELESS: 1
7. TROUBLE CONCENTRATING ON THINGS, SUCH AS READING THE NEWSPAPER OR WATCHING TELEVISION: 0
SUM OF ALL RESPONSES TO PHQ QUESTIONS 1-9: 6

## 2022-11-04 ASSESSMENT — ENCOUNTER SYMPTOMS
ABDOMINAL DISTENTION: 0
NAUSEA: 0
DIARRHEA: 0
VOMITING: 0
CHEST TIGHTNESS: 0
ABDOMINAL PAIN: 0
GASTROINTESTINAL NEGATIVE: 1
CONSTIPATION: 0
SHORTNESS OF BREATH: 0

## 2022-11-04 NOTE — PROGRESS NOTES
11/4/2022    Chief Complaint   Patient presents with    Follow-up     3 moths for Anxiety, Migraine Headaches, GERD. Barbara Woodruff is a 33 y.o. female, presents today for 3 month follow up of Anxiety, Migraine Headaches, GERD. HPI   Anxiety  Current medication: Zoloft 50 mg daily. Greatly improved with medication. Denies anxiety. Taking medications as prescribed and tolerating well. She denies medication side effects. She reports reports feeling \"good\". Admits being off work has affected her mood and has been a little anxiety and occasionally \"down from extra hormonal\" She denies homicidal and suicidal ideation and intent. Note:  Zoloft approved by OB/GYN to take during pregnancy. CARLOS 7 SCORE 11/4/2022 8/1/2022   CARLOS-7 Total Score 2 0     Interpretation of CARLOS-7 score: 5-9 = mild anxiety, 10-14 = moderate anxiety, 15+ = severe anxiety. Recommend referral to behavioral health for scores 10 or greater. PHQ Scores 11/4/2022 9/23/2022 8/1/2022 3/10/2022 12/17/2021 2/10/2021   PHQ2 Score 2 0 0 0 0 0   PHQ9 Score 6 0 0 0 0 0     Interpretation of Total Score Depression Severity: 1-4 = Minimal depression, 5-9 = Mild depression, 10-14 = Moderate depression, 15-19 = Moderately severe depression, 20-27 = Severe depression     Migraine headaches  Headaches have decreased in frequency with taking metoprolol (Lopressor) 50 mg twice daily. She is taking as prescribed and tolerating well. Denies side effects from medication. She has not had a migraine headache in over a month. She's  had 3 \"mild headaches in the past month\". She is no longer taking Imitrex and ibuprofen at onset of headaches due to pregnancy. GERD  Patient is taking Protonix 40 mg daily. Denies heartburn with pregnancy. No longer eating spicy foods and BBQ sauce due to heartburn. - Previous PPI includes Omeprazole. Pregnancy  Patient is 28 weeks pregnant. Due date is 1/22/23  She is seeing OB/GYN every other week. She reports pregnancy is progressing well, however cervix was 1 cm dilated at 20 week ultrasound / appointment. She was hospitalized from 9/7-9/9 at [facility] DR ANGELINE AGRAWAL in high risk unit and cerclage done. OB plans to remove cerclage at 36 weeks. She is inserting progesterone vaginally every night as prescribed. She is returning to work in 3 days on very light duty. Patient reports she is feeling good. Blood pressure has been excellent throughout pregnancy. Baby is \"very active\" to baseline. She denies abdominal pain, abdominal cramping, contractions, vaginal bleeding. Current Outpatient Medications on File Prior to Visit   Medication Sig Dispense Refill    progesterone (PROMETRIUM) 200 MG CAPS capsule Place 200 mg vaginally at bedtime      ondansetron (ZOFRAN) 4 MG tablet Take 2 tablets by mouth daily as needed for Nausea or Vomiting 30 tablet 0    albuterol sulfate HFA (VENTOLIN HFA) 108 (90 Base) MCG/ACT inhaler Inhale 2 puffs into the lungs 4 times daily as needed for Wheezing 18 g 5    pantoprazole (PROTONIX) 40 MG tablet Take 1 tablet in the morning before first meal 90 tablet 0    metoprolol tartrate (LOPRESSOR) 50 MG tablet Take 1 tablet by mouth in the morning and 1 tablet before bedtime. 180 tablet 0    sertraline (ZOLOFT) 50 MG tablet TAKE 1 TABLET BY MOUTH DAILY 90 tablet 0    Prenatal Vit-Fe Fumarate-FA (PRENATAL VITAMIN PO) Take 1 tablet by mouth daily       No current facility-administered medications on file prior to visit. Review of Systems   Constitutional:  Negative for diaphoresis and fatigue. Respiratory:  Negative for chest tightness and shortness of breath. Cardiovascular:  Negative for chest pain, palpitations and leg swelling. Gastrointestinal: Negative. Negative for abdominal distention, abdominal pain, constipation, diarrhea, nausea and vomiting. Musculoskeletal:  Negative for arthralgias, gait problem and myalgias. Skin: Negative.     Neurological:  Positive for headaches (improved). Negative for dizziness, tremors, seizures, weakness and light-headedness. Psychiatric/Behavioral:  Negative for confusion, decreased concentration, dysphoric mood, hallucinations, self-injury, sleep disturbance and suicidal ideas. The patient is nervous/anxious (improved). Allergies   Allergen Reactions    Penicillins Shortness Of Breath, Hives and Rash    Tylenol [Acetaminophen] Hives     Past Medical History:   Diagnosis Date    Asthma     Mental disorder     Migraine     Polycystic ovarian syndrome 12/01/2019     Past Surgical History:   Procedure Laterality Date    CERVICAL CERCLAGE        Social History     Tobacco Use    Smoking status: Never    Smokeless tobacco: Never   Substance Use Topics    Alcohol use: Not Currently      Family History   Problem Relation Age of Onset    High Blood Pressure Mother     Heart Disease Father     High Blood Pressure Father     High Cholesterol Father     Other Father     Migraines Sister     Diabetes Maternal Grandmother     Miscarriages / Stillbirths Maternal Grandmother     Vision Loss Maternal Grandmother         Caused by her diabetes. Diabetes Maternal Grandfather     Hypertension Maternal Grandfather     Cancer Maternal Grandfather         Bladder Cancer    Heart Disease Paternal Grandmother     Diabetes Paternal Grandmother     Hypertension Paternal Grandmother     Miscarriages / Stillbirths Paternal Grandmother     Lung Cancer Paternal Grandfather     Cancer Paternal Grandfather         Lung Cancer. Stroke Paternal Grandfather         Vitals:    11/04/22 1255   BP: 132/74   Site: Left Upper Arm   Position: Sitting   Cuff Size: Medium Adult   Pulse: 90   Temp: 97.2 °F (36.2 °C)   SpO2: 97%   Weight: 202 lb (91.6 kg)   Height: 5' 3\" (1.6 m)         Estimated body mass index is 35.78 kg/m² as calculated from the following:    Height as of this encounter: 5' 3\" (1.6 m). Weight as of this encounter: 202 lb (91.6 kg).     Physical Exam  Vitals and nursing note reviewed. Constitutional:       General: She is not in acute distress. Appearance: Normal appearance. She is well-developed. Neck:      Vascular: No carotid bruit. Cardiovascular:      Rate and Rhythm: Normal rate and regular rhythm. Pulses: Normal pulses. Heart sounds: Normal heart sounds, S1 normal and S2 normal.   Pulmonary:      Effort: Pulmonary effort is normal.      Breath sounds: Normal breath sounds. Musculoskeletal:         General: Normal range of motion. Cervical back: Normal range of motion and neck supple. Right lower leg: No edema. Left lower leg: No edema. Lymphadenopathy:      Cervical: No cervical adenopathy. Skin:     General: Skin is warm. Neurological:      General: No focal deficit present. Mental Status: She is alert and oriented to person, place, and time. Psychiatric:         Attention and Perception: Attention normal.         Mood and Affect: Mood and affect normal.         Speech: Speech normal.         Behavior: Behavior normal.         Thought Content: Thought content normal. Thought content is not paranoid or delusional. Thought content does not include homicidal or suicidal ideation. Thought content does not include homicidal or suicidal plan. Cognition and Memory: Cognition normal.         Assessment and Plan:  1. Anxiety  - Stable  -CARLOS-7 score: 2 (0)  -Continue Zoloft 50 mg daily    2. Migraine with aura and without status migrainosus, not intractable  -Stable    3. Gastroesophageal reflux disease without esophagitis  -Stable  -Continue Protonix 40 mg daily    4. 28 weeks gestation of pregnancy  -Follows OB/GYN  -Due date: 1/22/23      Return in about 3 months (around 2/4/2023) for 3 month follow up of anxiety, migraine headaches, GERD. Discussed use, benefit, and side effects of prescribed medications. Patient's questions answered and concerns addressed. Patient agrees to plan of care.      My scheduled days in the office reviewed with patient, and same day appointments available. Encouraged to use [employer] for communication as needed. Electronically signed by MARTA Beasley CNP on 12/18/2022 at 6:59 PM       This dictation was generated by voice recognition computer software. Although all attempts are made to edit the dictation for accuracy, there may be errors in the transcription that are not intended.

## 2022-11-12 NOTE — TELEPHONE ENCOUNTER
Will you change p/a to [place]?      Scheduled patient for MRI at [facility] 10-27-21
generalized

## 2022-11-21 ENCOUNTER — HOSPITAL ENCOUNTER (OUTPATIENT)
Age: 27
Discharge: HOME | End: 2022-11-21
Attending: OBSTETRICS & GYNECOLOGY | Admitting: OBSTETRICS & GYNECOLOGY
Payer: COMMERCIAL

## 2022-11-21 VITALS
DIASTOLIC BLOOD PRESSURE: 86 MMHG | TEMPERATURE: 98.4 F | HEART RATE: 91 BPM | OXYGEN SATURATION: 97 % | RESPIRATION RATE: 20 BRPM | SYSTOLIC BLOOD PRESSURE: 125 MMHG

## 2022-11-21 DIAGNOSIS — F41.9 ANXIETY: ICD-10-CM

## 2022-11-21 PROBLEM — O47.9 THREATENED LABOR: Status: ACTIVE | Noted: 2022-11-21

## 2022-11-21 LAB
BACTERIA: ABNORMAL /HPF
BILIRUBIN URINE: NEGATIVE
BLOOD, URINE: NEGATIVE
CLARITY: CLEAR
COLOR: YELLOW
EPITHELIAL CELLS, UA: 8 /HPF (ref 0–5)
GLUCOSE URINE: NEGATIVE MG/DL
HYALINE CASTS: 0 /LPF (ref 0–8)
KETONES, URINE: NEGATIVE MG/DL
LEUKOCYTE ESTERASE, URINE: ABNORMAL
MICROSCOPIC EXAMINATION: YES
NITRITE, URINE: NEGATIVE
PH UA: 6.5 (ref 5–8)
PROTEIN UA: ABNORMAL MG/DL
RBC UA: 2 /HPF (ref 0–4)
SPECIFIC GRAVITY UA: 1.01 (ref 1–1.03)
URINE TYPE: ABNORMAL
UROBILINOGEN, URINE: 1 E.U./DL
WBC UA: 75 /HPF (ref 0–5)

## 2022-11-21 PROCEDURE — 81001 URINALYSIS AUTO W/SCOPE: CPT

## 2022-11-21 PROCEDURE — 87086 URINE CULTURE/COLONY COUNT: CPT

## 2022-11-21 PROCEDURE — 59025 FETAL NON-STRESS TEST: CPT

## 2022-11-21 RX ORDER — NITROFURANTOIN 25; 75 MG/1; MG/1
100 CAPSULE ORAL 2 TIMES DAILY
Qty: 20 CAPSULE | Refills: 0 | Status: SHIPPED | OUTPATIENT
Start: 2022-11-21 | End: 2022-12-01

## 2022-11-21 NOTE — FLOWSHEET NOTE
Pt presents to triage with complaints of lower back and abdominal pain that radiates to vagina. EFM placed per protocol.

## 2022-11-21 NOTE — FLOWSHEET NOTE
Dr Mary Guardado called and notified pt in triage for back and abdominal pain that radiates to vagina. MD notified UA sent and pt has history of UTI's with this pregnancy.   MD requests RN to do vaginal exam.

## 2022-11-21 NOTE — TELEPHONE ENCOUNTER
Requested Prescriptions     Pending Prescriptions Disp Refills    sertraline (ZOLOFT) 50 MG tablet 90 tablet 0     Sig: TAKE 1 TABLET BY MOUTH DAILY       Last OV - 11/4/22. Next OV - 2/3/23. Last refill - 8/1/22. Last labs - 12/29/21.

## 2022-11-21 NOTE — FLOWSHEET NOTE
Labor precautions and UTI precautions reviewed with pt and significant other. Pt verbalized understanding. Prescription for Macrobid given to pt with instructions. Pt discharged to home ambulatory as ordered.

## 2022-11-21 NOTE — H&P
Department of Obstetrics and Gynecology  Labor and Delivery Triage Note        CHIEF COMPLAINT:  abdominal pain    HISTORY OF PRESENT ILLNESS:      The patient is a 33 y.o. female 31w1d. OB History          3    Para        Term                AB   2    Living             SAB   2    IAB        Ectopic        Molar        Multiple        Live Births                  Patient presents with a chief complaint as above. Her pain is located in low abdomen and low back. Pt denies vaginal bleeding, dysuria or urinary frequency    Estimated Due Date:  Estimated Date of Delivery: 23    PRENATAL CARE:    Complicated by: cerclage, was 1-1.5cm dilated at 20wks    REVIEW OF SYSTEMS:    CONSTITUTIONAL:  negative  EYES:  negative  HEENT:  negative  RESPIRATORY:  negative  CARDIOVASCULAR:  negative  GASTROINTESTINAL:  negative  GENITOURINARY:  negative  INTEGUMENT/BREAST:  negative  MUSCULOSKELETAL:  negative  BEHAVIOR/PSYCH:  negative    PHYSICAL EXAM:    Vitals:    22 0019   Resp: 20   Temp: 98.4 °F (36.9 °C)   TempSrc: Oral       Physical Examination: General appearance - alert, well appearing, and in no distress    Dilation (cm): Closed       Cervical Characteristics: Anterior      Mild right CVAT       Contraction frequency:  0 minutes    Membranes:  Intact    Lab Results   Component Value Date    WBC 6.9 2021    HGB 13.7 2021    HCT 41.8 2021     2021    ALT 31 2021    AST 34 2021     2021    K 4.6 2021     2021    CREATININE 0.6 2021    BUN 10 2021    CO2 24 2021    LABMICR YES 2022       ASSESSMENT AND PLAN:  Discharge Dx: The patient is a 33 y.o. female 31w1d.     OB History          3    Para        Term                AB   2    Living             SAB   2    IAB        Ectopic        Molar        Multiple        Live Births                 UTI    Disposition:  Patient discharged home and

## 2022-11-21 NOTE — DISCHARGE INSTRUCTIONS
Home Undelivered Discharge Instructions    After Discharge Orders:    Future Appointments   Date Time Provider Jovon Garcia   2/3/2023  1:15 PM Hyla Crista, APRN - CNP [facility] - [facility]       Call physician or midwife's office on [phone] for instructions. Diet:  normal diet as tolerated    Rest: normal activity as tolerated    Other instructions: Do kick counts once a day on your baby. Choose the time of day your baby is most active. Get in a comfortable lying or sitting position and time how long it takes to feel 10 kicks, twists, turns, swishes, or rolls. Call your physician or midwife if there have not been 10 kicks in 1 hours    Call physician or midwife, return to Labor and Delivery, call [phone], or go to the nearest Emergency Room if: increased leakage or fluid, contractions more than  6 per  1 hour, decreased fetal movement, persistent low back pain or cramping, bleeding from vaginal area, difficulty urinating, pain with urination, difficulty breathing, new calf pain, persistent headache, or vision change     Urinary Tract Infection (UTI) in Women: Care Instructions  Overview     A urinary tract infection, or UTI, is a general term for an infection anywhere between the kidneys and the urethra (where urine comes out). Most UTIs are bladder infections. They often cause pain or burning when you urinate. UTIs are caused by bacteria and can be cured with antibiotics. Be sure to complete your treatment so that the infection does not get worse. Follow-up care is a key part of your treatment and safety. Be sure to make and go to all appointments, and call your doctor if you are having problems. It's also a good idea to know your test results and keep a list of the medicines you take. How can you care for yourself at home? Take your antibiotics as directed. Do not stop taking them just because you feel better. You need to take the full course of antibiotics.   Drink extra water and other fluids for the next day or two. This will help make the urine less concentrated and help wash out the bacteria that are causing the infection. (If you have kidney, heart, or liver disease and have to limit fluids, talk with your doctor before you increase the amount of fluids you drink.)  Avoid drinks that are carbonated or have caffeine. They can irritate the bladder. Urinate often. Try to empty your bladder each time. To relieve pain, take a hot bath or lay a heating pad set on low over your lower belly or genital area. Never go to sleep with a heating pad in place. To prevent UTIs  Drink plenty of water each day. This helps you urinate often, which clears bacteria from your system. (If you have kidney, heart, or liver disease and have to limit fluids, talk with your doctor before you increase the amount of fluids you drink.)  Urinate when you need to. If you are sexually active, urinate right after you have sex. Change sanitary pads often. Avoid douches, bubble baths, feminine hygiene sprays, and other feminine hygiene products that have deodorants. After going to the bathroom, wipe from front to back. When should you call for help? Call your doctor now or seek immediate medical care if:    Symptoms such as fever, chills, nausea, or vomiting get worse or appear for the first time. You have new pain in your back just below your rib cage. This is called flank pain. There is new blood or pus in your urine. You have any problems with your antibiotic medicine. Watch closely for changes in your health, and be sure to contact your doctor if:    You are not getting better after taking an antibiotic for 2 days. Your symptoms go away but then come back. Where can you learn more? Go to [email or web address] and sign in to your theeventwall account. Enter J830 in the Rapportive box to learn more about \"Urinary Tract Infection (UTI) in Women: Care Instructions. \"     If you do not have an account, please click on the \"Sign Up Now\" link. Current as of: June 16, 2022               Content Version: 13.4  © 2006-2022 [employer]. Care instructions adapted under license by [facility] [place] ([facility]). If you have questions about a medical condition or this instruction, always ask your healthcare professional. [address] any warranty or liability for your use of this information.

## 2022-11-22 LAB — URINE CULTURE, ROUTINE: NORMAL

## 2022-11-30 ENCOUNTER — HOSPITAL ENCOUNTER (OUTPATIENT)
Age: 27
Discharge: HOME | End: 2022-11-30
Attending: OBSTETRICS & GYNECOLOGY | Admitting: OBSTETRICS & GYNECOLOGY
Payer: COMMERCIAL

## 2022-11-30 VITALS
SYSTOLIC BLOOD PRESSURE: 133 MMHG | BODY MASS INDEX: 36.14 KG/M2 | TEMPERATURE: 98.9 F | DIASTOLIC BLOOD PRESSURE: 86 MMHG | WEIGHT: 204 LBS | RESPIRATION RATE: 20 BRPM | HEART RATE: 82 BPM

## 2022-11-30 PROCEDURE — 99211 OFF/OP EST MAY X REQ PHY/QHP: CPT

## 2022-11-30 PROCEDURE — 59025 FETAL NON-STRESS TEST: CPT

## 2022-11-30 PROCEDURE — 6360000002 HC RX W HCPCS: Performed by: OBSTETRICS & GYNECOLOGY

## 2022-11-30 RX ORDER — BETAMETHASONE SODIUM PHOSPHATE AND BETAMETHASONE ACETATE 3; 3 MG/ML; MG/ML
12 INJECTION, SUSPENSION INTRA-ARTICULAR; INTRALESIONAL; INTRAMUSCULAR; SOFT TISSUE ONCE
Status: COMPLETED | OUTPATIENT
Start: 2022-11-30 | End: 2022-11-30

## 2022-11-30 RX ADMIN — BETAMETHASONE SODIUM PHOSPHATE AND BETAMETHASONE ACETATE 12 MG: 3; 3 INJECTION, SUSPENSION INTRA-ARTICULAR; INTRALESIONAL; INTRAMUSCULAR at 19:16

## 2022-12-01 ENCOUNTER — HOSPITAL ENCOUNTER (OUTPATIENT)
Age: 27
Discharge: HOME | End: 2022-12-01
Attending: OBSTETRICS & GYNECOLOGY | Admitting: OBSTETRICS & GYNECOLOGY
Payer: COMMERCIAL

## 2022-12-01 VITALS
HEIGHT: 63 IN | WEIGHT: 204 LBS | HEART RATE: 84 BPM | BODY MASS INDEX: 36.14 KG/M2 | RESPIRATION RATE: 18 BRPM | DIASTOLIC BLOOD PRESSURE: 65 MMHG | SYSTOLIC BLOOD PRESSURE: 128 MMHG

## 2022-12-01 PROBLEM — O47.02 THREATENED PREMATURE LABOR AFFECTING PREGNANCY, LESS THAN 37 WEEKS IN SECOND TRIMESTER, ANTEPARTUM: Status: ACTIVE | Noted: 2022-12-01

## 2022-12-01 PROCEDURE — 6360000002 HC RX W HCPCS: Performed by: OBSTETRICS & GYNECOLOGY

## 2022-12-01 PROCEDURE — 99211 OFF/OP EST MAY X REQ PHY/QHP: CPT

## 2022-12-01 PROCEDURE — 96372 THER/PROPH/DIAG INJ SC/IM: CPT

## 2022-12-01 RX ORDER — BETAMETHASONE SODIUM PHOSPHATE AND BETAMETHASONE ACETATE 3; 3 MG/ML; MG/ML
12 INJECTION, SUSPENSION INTRA-ARTICULAR; INTRALESIONAL; INTRAMUSCULAR; SOFT TISSUE ONCE
Status: COMPLETED | OUTPATIENT
Start: 2022-12-01 | End: 2022-12-01

## 2022-12-01 RX ADMIN — BETAMETHASONE SODIUM PHOSPHATE AND BETAMETHASONE ACETATE 12 MG: 3; 3 INJECTION, SUSPENSION INTRA-ARTICULAR; INTRALESIONAL; INTRAMUSCULAR at 20:00

## 2022-12-01 NOTE — H&P
Department of Obstetrics and Gynecology  Labor and Delivery  Attending Triage Note      SUBJECTIVE:  Patient has a cerclage    OBJECTIVE    Vitals:  /86   Pulse 82   Temp 98.9 °F (37.2 °C) (Oral)   Resp 20   Wt 204 lb (92.5 kg)   LMP 2021   BMI 36.14 kg/m²     CONSTITUTIONAL:  awake, alert, cooperative, no apparent distress, and appears stated age  NEUROLOGIC:  Awake, alert, oriented to name, place and time. Cranial nerves II-XII are grossly intact. Motor is 5 out of 5 bilaterally. Cerebellar finger to nose, heel to shin intact. Sensory is intact. Babinski down going, Romberg negative, and gait is normal.    Cervix:             Closed visually and by exam, cerclage still in place, bleeding noted from the cervical os. Fetal Position:      Membranes:  Intact    Fetal heart rate:         Baseline Heart Rate:  135        Accelerations:  present       Variability:  moderate    Contraction frequency: none        DATA:      ASSESSMENT & PLAN:      34 yo  @ 32w4d  Vaginal bleeding - cerclage intact and cervix still closed. No contractions noted on EFM and patient not feeling any. NST reactive. Betamethasone given in triage, offered observation in the hospital vs d/c home with precautions to return tomorrow for her second dose of betamethasone. Active Problems:    * No active hospital problems.  *

## 2022-12-01 NOTE — DISCHARGE INSTRUCTIONS
Home Undelivered Discharge Instructions    After Discharge Orders:    Future Appointments   Date Time Provider Jovon Micheli   2/3/2023  1:15 PM MARTA Tyson - JESSI BARAJAS       Call physician or midwife's office on *** for instructions. Diet:  normal diet as tolerated    Rest: normal activity as tolerated    Other instructions: Do kick counts once a day on your baby. Choose the time of day your baby is most active. Get in a comfortable lying or sitting position and time how long it takes to feel 10 kicks, twists, turns, swishes, or rolls.  Call your physician or midwife if there have not been 10 kicks in 1 hours    Call physician or midwife, return to Labor and Delivery, call 911, or go to the nearest Emergency Room if: increased leakage or fluid, contractions more than  4 per  1 hour, decreased fetal movement, persistent low back pain or cramping, bleeding from vaginal area, difficulty urinating, pain with urination, difficulty breathing, new calf pain, persistent headache, or vision change    Keep regularly scheduled appointments  Return to triage 12/1/22 for second celetone injection around 7pm    No work tomorrow

## 2022-12-01 NOTE — PROCEDURES
FETAL SURVEILLANCE TESTING SUMMARY    INDICATIONS:  vaginal bleeding    OBJECTIVE RESULTS:  Fetal heart variability: moderate  Fetal Heart Rate decelerations: none  Fetal Heart Rate accelerations: yes  Baseline FHR: 130 per minute  Fetal Non-stress Test: reactive    Fetal surveillance: reassuring    Radha Hodgson MD

## 2022-12-01 NOTE — FLOWSHEET NOTE
Pt discharged to home after evaluation for vaginal bleeding. SVE and SSE per Dr Meghann Mack revealed closed cervix. and intact cerclage. Scant amount of light red bleeding noted on panty liner on admission to triage. Pt denies any ctx and abdomen soft at rest. Denies any vaginal leaking. Pt given celestone injection #1. Pt will return to triage tomorrow evening around 7pm for second injection. Labor precautions reviewed with pt. Pt has appointment with Dr Ezekiel Lopez tomorrow morning. States she will return to triage before then if bleeding worsens or cramping or ctx occur. Pt states vaginal bleeding lighter now than when she came in , just spotting at time of discharge.

## 2022-12-02 NOTE — DISCHARGE INSTRUCTIONS
Home Undelivered Discharge Instructions    After Discharge Orders:    Future Appointments   Date Time Provider Jovon Garcia   2/3/2023  1:15 PM Adrianna Laurent, APRN - CNP [facility]       Keep next scheduled doctors appointment. Diet:  normal diet as tolerated    Rest: normal activity as tolerated    Other instructions: Do kick counts once a day on your baby. Choose the time of day your baby is most active. Get in a comfortable lying or sitting position and time how long it takes to feel 10 kicks, twists, turns, swishes, or rolls.  Call your physician or midwife if there have not been 10 kicks in 1 hours    Call physician or midwife, return to Labor and Delivery, call 911, or go to the nearest Emergency Room if: increased leakage or fluid, contractions more than  6 per  1 hour, decreased fetal movement, persistent low back pain or cramping, bleeding from vaginal area, difficulty urinating, pain with urination, difficulty breathing, new calf pain, persistent headache, or vision change

## 2022-12-02 NOTE — FLOWSHEET NOTE
Received pt in triage for 2nd celestone injection. Pt received 1st dose in triage yesterday. Reports feeling fetal movement, no loss of fluid or any other complaints at this time.

## 2022-12-02 NOTE — FLOWSHEET NOTE
Discharged home after receiving 2nd celestone injection. Discharge instructions given and reviewed. Verbalized understanding.

## 2022-12-05 ENCOUNTER — PATIENT MESSAGE (OUTPATIENT)
Dept: PRIMARY CARE CLINIC | Age: 27
End: 2022-12-05

## 2022-12-05 NOTE — TELEPHONE ENCOUNTER
Please instruct patient to schedule an appointment at The [address] [place] [address] to be evaluated.

## 2022-12-05 NOTE — TELEPHONE ENCOUNTER
From: [employer]   To: Connor Olmos  Sent: 12/5/2022 10:21 AM EST  Subject: Kei Dias. Im sick, its not covid or the flu but I feel like something is sitting on my chest and Im very wheezy. Should I make an appointment?

## 2022-12-05 NOTE — TELEPHONE ENCOUNTER
Pt was advise to the [facility] clinic today and schedule on 12/9/22, because provider does not have no opening till Friday afternoon, provider notify, pt expressed understanding.

## 2022-12-07 ENCOUNTER — HOSPITAL ENCOUNTER (INPATIENT)
Age: 27
LOS: 1 days | Discharge: HOME | End: 2022-12-07
Attending: OBSTETRICS & GYNECOLOGY | Admitting: OBSTETRICS & GYNECOLOGY
Payer: COMMERCIAL

## 2022-12-07 VITALS
TEMPERATURE: 98.2 F | SYSTOLIC BLOOD PRESSURE: 133 MMHG | HEART RATE: 100 BPM | OXYGEN SATURATION: 98 % | RESPIRATION RATE: 18 BRPM | DIASTOLIC BLOOD PRESSURE: 84 MMHG

## 2022-12-07 PROBLEM — O47.00 PRETERM UTERINE CONTRACTIONS: Status: ACTIVE | Noted: 2022-12-07

## 2022-12-07 PROCEDURE — 1220000000 HC SEMI PRIVATE OB R&B

## 2022-12-07 PROCEDURE — 59025 FETAL NON-STRESS TEST: CPT

## 2022-12-07 PROCEDURE — 99211 OFF/OP EST MAY X REQ PHY/QHP: CPT

## 2022-12-07 PROCEDURE — 4A0HXCZ MEASUREMENT OF PRODUCTS OF CONCEPTION, CARDIAC RATE, EXTERNAL APPROACH: ICD-10-PCS | Performed by: OBSTETRICS & GYNECOLOGY

## 2022-12-07 PROCEDURE — 2580000003 HC RX 258: Performed by: OBSTETRICS & GYNECOLOGY

## 2022-12-07 RX ORDER — SODIUM CHLORIDE, SODIUM LACTATE, POTASSIUM CHLORIDE, CALCIUM CHLORIDE 600; 310; 30; 20 MG/100ML; MG/100ML; MG/100ML; MG/100ML
INJECTION, SOLUTION INTRAVENOUS CONTINUOUS
Status: DISCONTINUED | OUTPATIENT
Start: 2022-12-07 | End: 2022-12-07 | Stop reason: HOSPADM

## 2022-12-07 RX ORDER — ONDANSETRON 4 MG/1
4 TABLET, ORALLY DISINTEGRATING ORAL EVERY 8 HOURS PRN
Status: DISCONTINUED | OUTPATIENT
Start: 2022-12-07 | End: 2022-12-07 | Stop reason: HOSPADM

## 2022-12-07 RX ORDER — ACETAMINOPHEN 325 MG/1
650 TABLET ORAL EVERY 4 HOURS PRN
Status: DISCONTINUED | OUTPATIENT
Start: 2022-12-07 | End: 2022-12-07 | Stop reason: HOSPADM

## 2022-12-07 RX ORDER — SODIUM CHLORIDE, SODIUM LACTATE, POTASSIUM CHLORIDE, AND CALCIUM CHLORIDE .6; .31; .03; .02 G/100ML; G/100ML; G/100ML; G/100ML
1000 INJECTION, SOLUTION INTRAVENOUS PRN
Status: DISCONTINUED | OUTPATIENT
Start: 2022-12-07 | End: 2022-12-07

## 2022-12-07 RX ORDER — SODIUM CHLORIDE, SODIUM LACTATE, POTASSIUM CHLORIDE, AND CALCIUM CHLORIDE .6; .31; .03; .02 G/100ML; G/100ML; G/100ML; G/100ML
500 INJECTION, SOLUTION INTRAVENOUS PRN
Status: DISCONTINUED | OUTPATIENT
Start: 2022-12-07 | End: 2022-12-07 | Stop reason: HOSPADM

## 2022-12-07 RX ORDER — ONDANSETRON 2 MG/ML
4 INJECTION INTRAMUSCULAR; INTRAVENOUS EVERY 6 HOURS PRN
Status: DISCONTINUED | OUTPATIENT
Start: 2022-12-07 | End: 2022-12-07 | Stop reason: HOSPADM

## 2022-12-07 RX ADMIN — SODIUM CHLORIDE, POTASSIUM CHLORIDE, SODIUM LACTATE AND CALCIUM CHLORIDE 500 ML: 600; 310; 30; 20 INJECTION, SOLUTION INTRAVENOUS at 16:10

## 2022-12-07 NOTE — H&P
Department of Obstetrics and Gynecology   Obstetrics History and Physical        CHIEF COMPLAINT:  cramping with cervical cerclage in place    HISTORY OF PRESENT ILLNESS:      The patient is a 33 y.o.  female with IUP at 33w3d. OB History          3    Para        Term                AB   2    Living             SAB   2    IAB        Ectopic        Molar        Multiple        Live Births                Patient presents with a chief complaint as above and is being admitted for observation and  contractions. Patient was seen in the office and complained of LOF and uterine cramping. She had negative nitrazine test but on cervical exam by Dr. Ramonita Sneed, patient's cervix was found to be 1 cm and effaced which was a change from last week. Patient sent for observation. Estimated Due Date: Estimated Date of Delivery: 23    PRENATAL CARE:    Complicated by:  Anxiety on Zoloft, Cervical incompetence- cerclage in place, History of bleeding last week and received celestone x 2    PAST OB HISTORY:  OB History          3    Para        Term                AB   2    Living             SAB   2    IAB        Ectopic        Molar        Multiple        Live Births                    Past Medical History:        Diagnosis Date    Asthma     Mental disorder     Migraine     Polycystic ovarian syndrome 2019     Past Surgical History:        Procedure Laterality Date    CERVICAL CERCLAGE       Allergies:  Penicillins and Tylenol [acetaminophen]    Social History:    Social History     Socioeconomic History    Marital status: Single     Spouse name: Not on file    Number of children: Not on file    Years of education: Not on file    Highest education level: Not on file   Occupational History    Not on file   Tobacco Use    Smoking status: Never    Smokeless tobacco: Never   Vaping Use    Vaping Use: Never used   Substance and Sexual Activity    Alcohol use: Not Currently    Drug use: Never    Sexual activity: Yes     Partners: Male   Other Topics Concern    Not on file   Social History Narrative    Not on file     Social Determinants of Health     Financial Resource Strain: Not on file   Food Insecurity: Not on file   Transportation Needs: Not on file   Physical Activity: Not on file   Stress: Not on file   Social Connections: Not on file   Intimate Partner Violence: Not on file   Housing Stability: Not on file     Family History:       Problem Relation Age of Onset    High Blood Pressure Mother     Heart Disease Father     High Blood Pressure Father     High Cholesterol Father     Other Father     Migraines Sister     Diabetes Maternal Grandmother     Miscarriages / Stillbirths Maternal Grandmother     Vision Loss Maternal Grandmother         Caused by her diabetes. Diabetes Maternal Grandfather     Hypertension Maternal Grandfather     Cancer Maternal Grandfather         Bladder Cancer    Heart Disease Paternal Grandmother     Diabetes Paternal Grandmother     Hypertension Paternal Grandmother     Miscarriages / Stillbirths Paternal Grandmother     Lung Cancer Paternal Grandfather     Cancer Paternal Grandfather         Lung Cancer. Stroke Paternal Grandfather      Medications Prior to Admission:  Medications Prior to Admission: sertraline (ZOLOFT) 50 MG tablet, TAKE 1 TABLET BY MOUTH DAILY  progesterone (PROMETRIUM) 200 MG CAPS capsule, Place 200 mg vaginally at bedtime  albuterol sulfate HFA (VENTOLIN HFA) 108 (90 Base) MCG/ACT inhaler, Inhale 2 puffs into the lungs 4 times daily as needed for Wheezing  pantoprazole (PROTONIX) 40 MG tablet, Take 1 tablet in the morning before first meal  metoprolol tartrate (LOPRESSOR) 50 MG tablet, Take 1 tablet by mouth in the morning and 1 tablet before bedtime.   Prenatal Vit-Fe Fumarate-FA (PRENATAL VITAMIN PO), Take 1 tablet by mouth daily    REVIEW OF SYSTEMS:    See above    PHYSICAL EXAM:  Vitals:    12/07/22 1348   BP: 133/84   Pulse: 100 Resp: 18   Temp: 98.2 °F (36.8 °C)   TempSrc: Oral   SpO2: 98%     General appearance:  awake, alert, cooperative, no apparent distress, and appears stated age  Neurologic:  Awake, alert, oriented to name, place and time. Lungs:  No increased work of breathing, good air exchange  Abdomen:  Soft, non tender, gravid, consistent with her gestational age,   Fetal heart rate:  Reassuring.   Pelvis:  Adequate pelvis  Cervix: 1/70/-3  Contraction frequency:  irregular  Membranes:  Intact    Labs: CBC with Differential:    Lab Results   Component Value Date/Time    WBC 6.9 12/29/2021 08:16 PM    RBC 4.83 12/29/2021 08:16 PM    HGB 13.7 12/29/2021 08:16 PM    HCT 41.8 12/29/2021 08:16 PM     12/29/2021 08:16 PM    MCV 86.6 12/29/2021 08:16 PM    MCH 28.3 12/29/2021 08:16 PM    MCHC 32.8 12/29/2021 08:16 PM    RDW 13.6 12/29/2021 08:16 PM    LYMPHOPCT 31.5 12/29/2021 08:16 PM    MONOPCT 13.7 12/29/2021 08:16 PM    BASOPCT 0.7 12/29/2021 08:16 PM    MONOSABS 0.9 12/29/2021 08:16 PM    LYMPHSABS 2.2 12/29/2021 08:16 PM    EOSABS 0.3 12/29/2021 08:16 PM    BASOSABS 0.1 12/29/2021 08:16 PM     CMP:    Lab Results   Component Value Date/Time     12/29/2021 08:16 PM    K 4.6 12/29/2021 08:16 PM     12/29/2021 08:16 PM    CO2 24 12/29/2021 08:16 PM    BUN 10 12/29/2021 08:16 PM    CREATININE 0.6 12/29/2021 08:16 PM    GFRAA >60 12/29/2021 08:16 PM    AGRATIO 1.3 12/29/2021 08:16 PM    LABGLOM >60 12/29/2021 08:16 PM    GLUCOSE 96 12/29/2021 08:16 PM    PROT 7.9 12/29/2021 08:16 PM    LABALBU 4.5 12/29/2021 08:16 PM    CALCIUM 9.4 12/29/2021 08:16 PM    BILITOT <0.2 12/29/2021 08:16 PM    ALKPHOS 124 12/29/2021 08:16 PM    AST 34 12/29/2021 08:16 PM    ALT 31 12/29/2021 08:16 PM     U/A:    Lab Results   Component Value Date/Time    COLORU Yellow 11/21/2022 12:25 AM    PROTEINU TRACE 11/21/2022 12:25 AM    PHUR 6.5 11/21/2022 12:25 AM    WBCUA 75 11/21/2022 12:25 AM    RBCUA 2 11/21/2022 12:25 AM    BACTERIA Rare 2022 12:25 AM    CLARITYU Clear 2022 12:25 AM    SPECGRAV 1.010 2022 12:25 AM    LEUKOCYTESUR LARGE 2022 12:25 AM    UROBILINOGEN 1.0 2022 12:25 AM    BILIRUBINUR Negative 2022 12:25 AM    BLOODU Negative 2022 12:25 AM    GLUCOSEU Negative 2022 12:25 AM       ASSESSMENT AND PLAN:  34 y/o  with cervical incontinence with cerclage in place with complaints of cramping. Plan: Admit for observation, monitor and rule out  labor  Fetus: Reassuring  GBS: unknown  Other: IV hydration   Spoke to MFM and they recommended that she be monitored for several hours and if she is not having consistent contractions and her cervix is not changing then they would not recommend removing the cerclage.

## 2022-12-07 NOTE — FLOWSHEET NOTE
Pt states she is not leaking any fluid at this time. IVF bolus infusing well. Pt ate dinner and has no requests at this time.

## 2022-12-08 ENCOUNTER — HOSPITAL ENCOUNTER (OUTPATIENT)
Age: 27
Setting detail: OBSERVATION
Discharge: HOME | End: 2022-12-09
Attending: OBSTETRICS & GYNECOLOGY | Admitting: OBSTETRICS & GYNECOLOGY
Payer: COMMERCIAL

## 2022-12-08 PROBLEM — O46.90 VAGINAL BLEEDING DURING PREGNANCY: Status: ACTIVE | Noted: 2022-12-08

## 2022-12-08 LAB
BACTERIA: ABNORMAL /HPF
BASOPHILS ABSOLUTE: 0.1 K/UL (ref 0–0.2)
BASOPHILS RELATIVE PERCENT: 0.5 %
BILIRUBIN URINE: NEGATIVE
BLOOD, URINE: ABNORMAL
CLARITY: ABNORMAL
COLOR: ABNORMAL
EOSINOPHILS ABSOLUTE: 0.4 K/UL (ref 0–0.6)
EOSINOPHILS RELATIVE PERCENT: 3.2 %
EPITHELIAL CELLS, UA: 3 /HPF (ref 0–5)
GLUCOSE URINE: NEGATIVE MG/DL
HCT VFR BLD CALC: 33.4 % (ref 36–48)
HEMOGLOBIN: 11.2 G/DL (ref 12–16)
HYALINE CASTS: 1 /LPF (ref 0–8)
KETONES, URINE: NEGATIVE MG/DL
LEUKOCYTE ESTERASE, URINE: ABNORMAL
LYMPHOCYTES ABSOLUTE: 2.2 K/UL (ref 1–5.1)
LYMPHOCYTES RELATIVE PERCENT: 18.5 %
MCH RBC QN AUTO: 27.9 PG (ref 26–34)
MCHC RBC AUTO-ENTMCNC: 33.5 G/DL (ref 31–36)
MCV RBC AUTO: 83.2 FL (ref 80–100)
MICROSCOPIC EXAMINATION: YES
MONOCYTES ABSOLUTE: 1.1 K/UL (ref 0–1.3)
MONOCYTES RELATIVE PERCENT: 9.7 %
NEUTROPHILS ABSOLUTE: 8.1 K/UL (ref 1.7–7.7)
NEUTROPHILS RELATIVE PERCENT: 68.1 %
NITRITE, URINE: NEGATIVE
PDW BLD-RTO: 13.7 % (ref 12.4–15.4)
PH UA: 6.5 (ref 5–8)
PLATELET # BLD: 352 K/UL (ref 135–450)
PMV BLD AUTO: 8.1 FL (ref 5–10.5)
PROTEIN UA: 100 MG/DL
RBC # BLD: 4.01 M/UL (ref 4–5.2)
RBC UA: 1513 /HPF (ref 0–4)
SPECIFIC GRAVITY UA: 1.01 (ref 1–1.03)
URINE TYPE: ABNORMAL
UROBILINOGEN, URINE: 1 E.U./DL
WBC # BLD: 11.8 K/UL (ref 4–11)
WBC UA: 27 /HPF (ref 0–5)

## 2022-12-08 PROCEDURE — 80307 DRUG TEST PRSMV CHEM ANLYZR: CPT

## 2022-12-08 PROCEDURE — G0379 DIRECT REFER HOSPITAL OBSERV: HCPCS

## 2022-12-08 PROCEDURE — G0378 HOSPITAL OBSERVATION PER HR: HCPCS

## 2022-12-08 PROCEDURE — 86901 BLOOD TYPING SEROLOGIC RH(D): CPT

## 2022-12-08 PROCEDURE — 96360 HYDRATION IV INFUSION INIT: CPT

## 2022-12-08 PROCEDURE — 86780 TREPONEMA PALLIDUM: CPT

## 2022-12-08 PROCEDURE — 86850 RBC ANTIBODY SCREEN: CPT

## 2022-12-08 PROCEDURE — 86900 BLOOD TYPING SEROLOGIC ABO: CPT

## 2022-12-08 PROCEDURE — 81001 URINALYSIS AUTO W/SCOPE: CPT

## 2022-12-08 PROCEDURE — 87081 CULTURE SCREEN ONLY: CPT

## 2022-12-08 PROCEDURE — 2580000003 HC RX 258: Performed by: OBSTETRICS & GYNECOLOGY

## 2022-12-08 PROCEDURE — 85025 COMPLETE CBC W/AUTO DIFF WBC: CPT

## 2022-12-08 RX ORDER — SODIUM CHLORIDE 0.9 % (FLUSH) 0.9 %
5-40 SYRINGE (ML) INJECTION EVERY 12 HOURS SCHEDULED
Status: DISCONTINUED | OUTPATIENT
Start: 2022-12-08 | End: 2022-12-09 | Stop reason: HOSPADM

## 2022-12-08 RX ORDER — SODIUM CHLORIDE 9 MG/ML
INJECTION, SOLUTION INTRAVENOUS PRN
Status: DISCONTINUED | OUTPATIENT
Start: 2022-12-08 | End: 2022-12-09 | Stop reason: HOSPADM

## 2022-12-08 RX ORDER — ONDANSETRON 2 MG/ML
4 INJECTION INTRAMUSCULAR; INTRAVENOUS EVERY 6 HOURS PRN
Status: DISCONTINUED | OUTPATIENT
Start: 2022-12-08 | End: 2022-12-09 | Stop reason: HOSPADM

## 2022-12-08 RX ORDER — SODIUM CHLORIDE, SODIUM LACTATE, POTASSIUM CHLORIDE, CALCIUM CHLORIDE 600; 310; 30; 20 MG/100ML; MG/100ML; MG/100ML; MG/100ML
INJECTION, SOLUTION INTRAVENOUS CONTINUOUS
Status: DISCONTINUED | OUTPATIENT
Start: 2022-12-08 | End: 2022-12-09 | Stop reason: HOSPADM

## 2022-12-08 RX ORDER — ONDANSETRON 4 MG/1
4 TABLET, ORALLY DISINTEGRATING ORAL EVERY 8 HOURS PRN
Status: DISCONTINUED | OUTPATIENT
Start: 2022-12-08 | End: 2022-12-09 | Stop reason: HOSPADM

## 2022-12-08 RX ORDER — SODIUM CHLORIDE 0.9 % (FLUSH) 0.9 %
5-40 SYRINGE (ML) INJECTION PRN
Status: DISCONTINUED | OUTPATIENT
Start: 2022-12-08 | End: 2022-12-09 | Stop reason: HOSPADM

## 2022-12-08 RX ADMIN — SODIUM CHLORIDE, POTASSIUM CHLORIDE, SODIUM LACTATE AND CALCIUM CHLORIDE: 600; 310; 30; 20 INJECTION, SOLUTION INTRAVENOUS at 23:56

## 2022-12-08 NOTE — DISCHARGE SUMMARY
Patient was admitted for observation, after consultation with MFM by phone by the previous physician on call, the decision was made to discharge the patient home as there was no cervical change and contractions had greatly decreased. The patient desired discharge home in stable condition to follow-up in the office as scheduled.

## 2022-12-08 NOTE — DISCHARGE INSTRUCTIONS
Home Undelivered Discharge Instructions    After Discharge Orders:    Future Appointments   Date Time Provider Jovon Garcia   12/9/2022  2:30 PM Delon Kalata, APRN - CNP [facility] - [facility]   2/3/2023  1:15 PM MARTA Hawkins CNP [place] [facility]       Call physician or [address] office on [phone] for instructions. Diet:  normal diet as tolerated    Rest: normal activity as tolerated    Other instructions: Do kick counts once a day on your baby. Choose the time of day your baby is most active. Get in a comfortable lying or sitting position and time how long it takes to feel 10 kicks, twists, turns, swishes, or rolls.  Call your physician or midwife if there have not been 10 kicks in 1 hours    Call physician or midwife, return to Labor and Delivery, call 911, or go to the nearest Emergency Room if: increased leakage or fluid, contractions more than  5 per  1 hour, decreased fetal movement, persistent low back pain or cramping, bleeding from vaginal area, difficulty urinating, pain with urination, difficulty breathing, new calf pain, persistent headache, or vision change

## 2022-12-08 NOTE — FLOWSHEET NOTE
Discharge teaching completed and forms signed by patient. Copy witnessed by RN and given to patient. Patient verbalized understanding of all teaching points. Prescriptions given if applicable. Patient plans to follow-up with [facility] Provider as instructed. Patient verbalizes understanding of discharge instructions and denies further questions. Patient discharged in stable condition accompanied by family/guardian. Discharged in wheelchair.

## 2022-12-08 NOTE — PROCEDURES
FETAL SURVEILLANCE TESTING SUMMARY    INDICATIONS:  rule out uterine contractions    OBJECTIVE RESULTS:  Fetal heart variability: moderate  Fetal Heart Rate decelerations: none  Fetal Heart Rate accelerations: yes  Baseline FHR: 135 per minute  Fetal Non-stress Test: reactive    Fetal surveillance: reassuring    Ravi Lee MD

## 2022-12-09 VITALS
OXYGEN SATURATION: 96 % | HEART RATE: 92 BPM | SYSTOLIC BLOOD PRESSURE: 134 MMHG | TEMPERATURE: 97.5 F | RESPIRATION RATE: 16 BRPM | DIASTOLIC BLOOD PRESSURE: 84 MMHG

## 2022-12-09 LAB
ABO/RH: NORMAL
AMPHETAMINE SCREEN, URINE: NORMAL
ANTIBODY SCREEN: NORMAL
BARBITURATE SCREEN URINE: NORMAL
BENZODIAZEPINE SCREEN, URINE: NORMAL
BUPRENORPHINE URINE: NORMAL
CANNABINOID SCREEN URINE: NORMAL
COCAINE METABOLITE SCREEN URINE: NORMAL
FENTANYL SCREEN, URINE: NORMAL
Lab: NORMAL
METHADONE SCREEN, URINE: NORMAL
OPIATE SCREEN URINE: NORMAL
OXYCODONE URINE: NORMAL
PH UA: 6.5
PHENCYCLIDINE SCREEN URINE: NORMAL
TOTAL SYPHILLIS IGG/IGM: NORMAL

## 2022-12-09 PROCEDURE — 96361 HYDRATE IV INFUSION ADD-ON: CPT

## 2022-12-09 PROCEDURE — 59025 FETAL NON-STRESS TEST: CPT

## 2022-12-09 PROCEDURE — G0378 HOSPITAL OBSERVATION PER HR: HCPCS

## 2022-12-09 PROCEDURE — 6370000000 HC RX 637 (ALT 250 FOR IP): Performed by: OBSTETRICS & GYNECOLOGY

## 2022-12-09 RX ORDER — PROGESTERONE 100 MG/1
200 CAPSULE ORAL NIGHTLY
Status: DISCONTINUED | OUTPATIENT
Start: 2022-12-09 | End: 2022-12-09 | Stop reason: HOSPADM

## 2022-12-09 RX ORDER — PANTOPRAZOLE SODIUM 40 MG/1
40 TABLET, DELAYED RELEASE ORAL
Status: DISCONTINUED | OUTPATIENT
Start: 2022-12-09 | End: 2022-12-09 | Stop reason: HOSPADM

## 2022-12-09 RX ADMIN — SERTRALINE 50 MG: 50 TABLET, FILM COATED ORAL at 08:41

## 2022-12-09 RX ADMIN — PANTOPRAZOLE SODIUM 40 MG: 40 TABLET, DELAYED RELEASE ORAL at 07:04

## 2022-12-09 ASSESSMENT — PAIN SCALES - GENERAL: PAINLEVEL_OUTOF10: 0

## 2022-12-09 NOTE — DISCHARGE INSTRUCTIONS
Home Undelivered Discharge Instructions    After Discharge Orders:    Future Appointments   Date Time Provider Jovon Garcia   2/3/2023  1:15 PM MARTA Renteria - JESSI BARAJAS     Follow up with OB provider on 12/9/2022 at 1015 as scheduled. Diet:  normal diet as tolerated    Rest: normal activity as tolerated    Other instructions: Do kick counts once a day on your baby. Choose the time of day your baby is most active. Get in a comfortable lying or sitting position and time how long it takes to feel 10 kicks, twists, turns, swishes, or rolls.  Call your physician or midwife if there have not been 10 kicks in 1 hours    Call physician or midwife, return to Labor and Delivery, call 911, or go to the nearest Emergency Room if: increased leakage or fluid, contractions more than  6 per  1 hour, decreased fetal movement, persistent low back pain or cramping, bleeding from vaginal area, difficulty urinating, pain with urination, difficulty breathing, new calf pain, persistent headache, or vision change

## 2022-12-09 NOTE — FLOWSHEET NOTE
Pt presents to triage for Vaginal bleeding that started today around 4 pm per pt. Pt denies any abdominal pain or cramping. Pt states + fetal movement.

## 2022-12-09 NOTE — FLOWSHEET NOTE
Dr Mckayla Mora at bedside. Speculum exam done per MD.  Bleeding coming from cerclage per MD.  MD Discussed with pt the need to remove cerclage. Pt verbalized understanding.

## 2022-12-09 NOTE — FLOWSHEET NOTE
[id number]: Dr Sri Condon at bedside. SVE performed. [id number] [address] for patient to come off monitor and be discharged home. POC discussed with patient. Patient verbalized understanding.

## 2022-12-09 NOTE — FLOWSHEET NOTE
Discharge instructions reviewed with patient. Patient verbalized understanding of all teaching points. Patient plans to follow up with [facility] provider today as scheduled. Patient discharged ambulatory and undelivered at 0910. Patient has an appointment scheduled for the date of 8-.  Requesting lab orders related to upcoming visit. States patient normally has edema to lower extremities, but seems to be more edema than normal. Denies any difficulty breathing. Please place lab orders/advise.

## 2022-12-09 NOTE — H&P
Department of Obstetrics and Gynecology   Obstetrics History and Physical        CHIEF COMPLAINT:  vaginal bleeding    HISTORY OF PRESENT ILLNESS:      The patient is a 33 y.o. female at 26w1d. OB History          3    Para        Term                AB   2    Living             SAB   2    IAB        Ectopic        Molar        Multiple        Live Births                Patient presents with a chief complaint as above and is being admitted for vaginal bleeding with known cerclage. Denies contractions, leaking of fluid. Bleeding began earlier this evening. Started as spotting then increased. Currently having to wear a pad. Pt had contractions 2d ago and was admitted for observation and given a course of celestone. Estimated Due Date: Estimated Date of Delivery: 23    PRENATAL CARE:    Complicated by: short cervix s/p rescue cerclage, third trimester bleeding.      PAST OB HISTORY:  OB History          3    Para        Term                AB   2    Living             SAB   2    IAB        Ectopic        Molar        Multiple        Live Births                    Past Medical History:        Diagnosis Date    Asthma     Mental disorder     Migraine     Polycystic ovarian syndrome 2019     Past Surgical History:        Procedure Laterality Date    CERVICAL CERCLAGE       Allergies:  Penicillins and Tylenol [acetaminophen]    Social History:    Social History     Socioeconomic History    Marital status: Single     Spouse name: Not on file    Number of children: Not on file    Years of education: Not on file    Highest education level: Not on file   Occupational History    Not on file   Tobacco Use    Smoking status: Never    Smokeless tobacco: Never   Vaping Use    Vaping Use: Never used   Substance and Sexual Activity    Alcohol use: Not Currently    Drug use: Never    Sexual activity: Yes     Partners: Male   Other Topics Concern    Not on file   Social History Narrative Not on file     Social Determinants of Health     Financial Resource Strain: Not on file   Food Insecurity: Not on file   Transportation Needs: Not on file   Physical Activity: Not on file   Stress: Not on file   Social Connections: Not on file   Intimate Partner Violence: Not on file   Housing Stability: Not on file     Family History:       Problem Relation Age of Onset    High Blood Pressure Mother     Heart Disease Father     High Blood Pressure Father     High Cholesterol Father     Other Father     Migraines Sister     Diabetes Maternal Grandmother     Miscarriages / Stillbirths Maternal Grandmother     Vision Loss Maternal Grandmother         Caused by her diabetes. Diabetes Maternal Grandfather     Hypertension Maternal Grandfather     Cancer Maternal Grandfather         Bladder Cancer    Heart Disease Paternal Grandmother     Diabetes Paternal Grandmother     Hypertension Paternal Grandmother     Miscarriages / Stillbirths Paternal Grandmother     Lung Cancer Paternal Grandfather     Cancer Paternal Grandfather         Lung Cancer. Stroke Paternal Grandfather      Medications Prior to Admission:  Medications Prior to Admission: sertraline (ZOLOFT) 50 MG tablet, TAKE 1 TABLET BY MOUTH DAILY  progesterone (PROMETRIUM) 200 MG CAPS capsule, Place 200 mg vaginally at bedtime  albuterol sulfate HFA (VENTOLIN HFA) 108 (90 Base) MCG/ACT inhaler, Inhale 2 puffs into the lungs 4 times daily as needed for Wheezing  pantoprazole (PROTONIX) 40 MG tablet, Take 1 tablet in the morning before first meal  metoprolol tartrate (LOPRESSOR) 50 MG tablet, Take 1 tablet by mouth in the morning and 1 tablet before bedtime.   Prenatal Vit-Fe Fumarate-FA (PRENATAL VITAMIN PO), Take 1 tablet by mouth daily    REVIEW OF SYSTEMS:    CONSTITUTIONAL:  negative  RESPIRATORY:  negative  CARDIOVASCULAR:  negative  GASTROINTESTINAL:  negative  ALLERGIC/IMMUNOLOGIC:  negative  NEUROLOGICAL:  negative  BEHAVIOR/PSYCH: negative    PHYSICAL EXAM:  Vitals:    12/08/22 2226   Resp: 20   Temp: 98.8 °F (37.1 °C)   TempSrc: Oral     General appearance:  awake, alert, cooperative, no apparent distress, and appears stated age  Neurologic:  Awake, alert, oriented to name, place and time. Lungs:  No increased work of breathing, good air exchange  Abdomen:  Soft, non tender, gravid, consistent with her gestational age   Fetal heart rate:  Reassuring.   Pelvis:  Adequate pelvis  Cervix: 1 cm 50% soft -3  Contraction frequency:  none    Membranes:  Intact    GBS cultrure done    ASSESSMENT AND PLAN: 33+4 wks, Short cervix, Vaginal Bleeding    Plan admit for observation s/p cerclage removal.     Fetus: Reassuring  GBS: unknown

## 2022-12-09 NOTE — FLOWSHEET NOTE
Dr Sri Condon at bedside.   Pt legs in stirrups and speculum placed per MD for cerclage removal.  Cerclage removed without difficulty per MD.

## 2022-12-11 LAB — GROUP B STREP CULTURE: NORMAL

## 2022-12-12 LAB — GROUP B STREP CULTURE: NORMAL

## 2023-01-06 ENCOUNTER — HOSPITAL ENCOUNTER (OUTPATIENT)
Age: 28
Discharge: HOME | End: 2023-01-06
Attending: OBSTETRICS & GYNECOLOGY | Admitting: OBSTETRICS & GYNECOLOGY
Payer: COMMERCIAL

## 2023-01-06 VITALS
HEIGHT: 63 IN | BODY MASS INDEX: 37.03 KG/M2 | TEMPERATURE: 98.1 F | DIASTOLIC BLOOD PRESSURE: 89 MMHG | RESPIRATION RATE: 16 BRPM | HEART RATE: 100 BPM | SYSTOLIC BLOOD PRESSURE: 136 MMHG | WEIGHT: 209 LBS

## 2023-01-06 PROBLEM — O47.9 THREATENED LABOR AT TERM: Status: ACTIVE | Noted: 2023-01-06

## 2023-01-06 PROCEDURE — 59025 FETAL NON-STRESS TEST: CPT

## 2023-01-06 PROCEDURE — 99211 OFF/OP EST MAY X REQ PHY/QHP: CPT

## 2023-01-06 NOTE — FLOWSHEET NOTE
Dr. Castillo Form at bedside. Discharge instructions given and explained. Orders received to discharge home. AVS signed. All questions answered. Discharged home in stable condition.

## 2023-01-06 NOTE — H&P
Department of Obstetrics and Gynecology  Labor and Delivery Triage Note        CHIEF COMPLAINT:  leakage of amniotic fluid    HISTORY OF PRESENT ILLNESS:      The patient is a 33 y.o. female 37w6d. OB History          3    Para        Term                AB   2    Living             SAB   2    IAB        Ectopic        Molar        Multiple        Live Births                  Patient presents with a chief complaint as above. She is also endy all day every few minutes. Estimated Due Date:  Estimated Date of Delivery: 23    PAST MEDICAL HISTORY:   Past Medical History:   Diagnosis Date    Asthma     Mental disorder     Migraine     Polycystic ovarian syndrome 2019       PAST  SURGICAL HISTORY:   Past Surgical History:   Procedure Laterality Date    CERVICAL CERCLAGE         SOCIAL HISTORY:     reports that she has never smoked. She has never used smokeless tobacco. She reports that she does not currently use alcohol. She reports that she does not use drugs. MEDICATIONS:    Prior to Admission medications    Medication Sig Start Date End Date Taking?  Authorizing Provider   sertraline (ZOLOFT) 50 MG tablet TAKE 1 TABLET BY MOUTH DAILY 22   MARTA Jay CNP   progesterone (PROMETRIUM) 200 MG CAPS capsule Place 200 mg vaginally at bedtime  Patient not taking: Reported on 2023 9/15/22   Historical Provider, MD   albuterol sulfate HFA (VENTOLIN HFA) 108 (90 Base) MCG/ACT inhaler Inhale 2 puffs into the lungs 4 times daily as needed for Wheezing 22   MARTA Jay CNP   pantoprazole (PROTONIX) 40 MG tablet Take 1 tablet in the morning before first meal 22   MARTA Jay CNP   Prenatal Vit-Fe Fumarate-FA (PRENATAL VITAMIN PO) Take 1 tablet by mouth daily    Historical Provider, MD        PRENATAL CARE:    Complicated by: polyhydramnios, S/P cerclage    REVIEW OF SYSTEMS:     Constitutional: positive for none  Gastrointestinal: positive for abdominal pain  Genitourinary:positive for leaking vaginal fluid    PHYSICAL EXAM:    Vital Signs: Blood pressure 136/89, pulse 100, temperature 98.1 °F (36.7 °C), temperature source Oral, resp. rate 16, height 5' 3\" (1.6 m), weight 209 lb (94.8 kg), last menstrual period 2021, not currently breastfeeding. Abdomen soft, non tender, consistent with her EGA. Fetal heart rate:  Baseline Heart Rate 130, accelerations:  present  Cervix:  3 cm 100% soft -2  Spec exam: no pooling, cervix friable- contact bleeding present, nitrazine neg, fern neg. Vertex  Contraction frequency:  not seen on monitor minutes    Membranes:  Intact        ASSESSMENT:    37w5d. No evidence of SROM, may be latent labor  Patient Active Problem List   Diagnosis    Migraine with aura and without status migrainosus, not intractable    Right wrist pain    Anxiety    Infection of umbilical cord    Abscess    Gastroesophageal reflux disease without esophagitis    Threatened labor    Threatened premature labor affecting pregnancy, less than 37 weeks in second trimester, antepartum     uterine contractions    Vaginal bleeding during pregnancy    Threatened labor at term          PLAN: Given options of observation vs discharge to home. Requests discharge.    Disposition:  Patient discharged home and instructed on symptoms of labor, rupture of membranes, vaginal bleeding, and fetal movement  F/U Instructions: as needed    Estrellita Herman MD  2023

## 2023-01-06 NOTE — DISCHARGE SUMMARY
Obstetrical Discharge Form    Gestational Age: 37w6d    Antepartum complications: Third trimester bleeding, Cervical insufficiencey s/p Cerclage placement    Hospital course: Pt presented with 3rd trimester bleeding with cerclage in place. Cerclage was removed and patient was observed overnight. Cx exam was stable /-3 and patient was discharged home with PTL precautions. Discharge Medication:      Medication List        CONTINUE taking these medications      albuterol sulfate  (90 Base) MCG/ACT inhaler  Commonly known as: Ventolin HFA  Inhale 2 puffs into the lungs 4 times daily as needed for Wheezing     pantoprazole 40 MG tablet  Commonly known as: Protonix  Take 1 tablet in the morning before first meal     PRENATAL VITAMIN PO     progesterone 200 MG Caps capsule  Commonly known as: PROMETRIUM     sertraline 50 MG tablet  Commonly known as: Zoloft  TAKE 1 TABLET BY MOUTH DAILY            STOP taking these medications      metoprolol tartrate 50 MG tablet  Commonly known as: LOPRESSOR              Discharge Condition:  good    Discharge Date: 22    PLAN:  Follow up in 6 weeks for routine PP visit  All questions answered  D/C summary begun at delivery for D/C planning purposes, any delay in discharge from ordered D/C date due to  factors.

## 2023-01-06 NOTE — PROCEDURES
FETAL SURVEILLANCE TESTING SUMMARY    INDICATIONS:  R/o SROM and labor, 37 week    OBJECTIVE RESULTS:  Fetal heart variability: moderate  Fetal Heart Rate decelerations: none  Fetal Heart Rate accelerations: yes  Baseline FHR: 130 per minute  Fetal Non-stress Test: reactive    Fetal surveillance: reassuring

## 2023-01-06 NOTE — DISCHARGE INSTRUCTIONS
Home Undelivered Discharge Instructions    After Discharge Orders:    Future Appointments   Date Time Provider Jovon Radha   2/3/2023  1:15 PM MATRA Fleming CNP       Keep next ob doctor appointment on Friday or call with questions or concerns. Diet:  normal diet as tolerated    Rest: normal activity as tolerated    Other instructions: Do kick counts once a day on your baby. Choose the time of day your baby is most active. Get in a comfortable lying or sitting position and time how long it takes to feel 10 kicks, twists, turns, swishes, or rolls.  Call your physician or midwife if there have not been 10 kicks in 1 hours    Call physician or midwife, return to Labor and Delivery, call 911, or go to the nearest Emergency Room if: increased leakage or fluid, contractions more than  3 per  15 minutes, decreased fetal movement, persistent low back pain or cramping, bleeding from vaginal area, difficulty urinating, pain with urination, difficulty breathing, new calf pain, persistent headache, or vision change

## 2023-01-10 ENCOUNTER — ANESTHESIA EVENT (OUTPATIENT)
Dept: LABOR AND DELIVERY | Age: 28
End: 2023-01-10
Payer: COMMERCIAL

## 2023-01-10 ENCOUNTER — HOSPITAL ENCOUNTER (INPATIENT)
Age: 28
LOS: 1 days | Discharge: HOME | End: 2023-01-11
Attending: OBSTETRICS & GYNECOLOGY | Admitting: OBSTETRICS & GYNECOLOGY
Payer: COMMERCIAL

## 2023-01-10 ENCOUNTER — ANESTHESIA (OUTPATIENT)
Dept: LABOR AND DELIVERY | Age: 28
End: 2023-01-10
Payer: COMMERCIAL

## 2023-01-10 LAB
ABO/RH: NORMAL
AMPHETAMINE SCREEN, URINE: NORMAL
ANTIBODY SCREEN: NORMAL
BARBITURATE SCREEN URINE: NORMAL
BASOPHILS ABSOLUTE: 0 K/UL (ref 0–0.2)
BASOPHILS RELATIVE PERCENT: 0.4 %
BENZODIAZEPINE SCREEN, URINE: NORMAL
BUPRENORPHINE URINE: NORMAL
CANNABINOID SCREEN URINE: NORMAL
COCAINE METABOLITE SCREEN URINE: NORMAL
EOSINOPHILS ABSOLUTE: 0.1 K/UL (ref 0–0.6)
EOSINOPHILS RELATIVE PERCENT: 1 %
FENTANYL SCREEN, URINE: NORMAL
HCT VFR BLD CALC: 33.5 % (ref 36–48)
HEMOGLOBIN: 11 G/DL (ref 12–16)
LYMPHOCYTES ABSOLUTE: 1.8 K/UL (ref 1–5.1)
LYMPHOCYTES RELATIVE PERCENT: 15.4 %
Lab: NORMAL
MCH RBC QN AUTO: 27 PG (ref 26–34)
MCHC RBC AUTO-ENTMCNC: 32.7 G/DL (ref 31–36)
MCV RBC AUTO: 82.5 FL (ref 80–100)
METHADONE SCREEN, URINE: NORMAL
MONOCYTES ABSOLUTE: 0.9 K/UL (ref 0–1.3)
MONOCYTES RELATIVE PERCENT: 7.9 %
NEUTROPHILS ABSOLUTE: 8.7 K/UL (ref 1.7–7.7)
NEUTROPHILS RELATIVE PERCENT: 75.3 %
OPIATE SCREEN URINE: NORMAL
OXYCODONE URINE: NORMAL
PDW BLD-RTO: 14.7 % (ref 12.4–15.4)
PH UA: 7
PHENCYCLIDINE SCREEN URINE: NORMAL
PLATELET # BLD: 301 K/UL (ref 135–450)
PMV BLD AUTO: 8.8 FL (ref 5–10.5)
RBC # BLD: 4.06 M/UL (ref 4–5.2)
TOTAL SYPHILLIS IGG/IGM: NORMAL
WBC # BLD: 11.6 K/UL (ref 4–11)

## 2023-01-10 PROCEDURE — 1220000000 HC SEMI PRIVATE OB R&B

## 2023-01-10 PROCEDURE — 7200000001 HC VAGINAL DELIVERY

## 2023-01-10 PROCEDURE — 2580000003 HC RX 258: Performed by: OBSTETRICS & GYNECOLOGY

## 2023-01-10 PROCEDURE — 6360000002 HC RX W HCPCS: Performed by: ANESTHESIOLOGY

## 2023-01-10 PROCEDURE — 85025 COMPLETE CBC W/AUTO DIFF WBC: CPT

## 2023-01-10 PROCEDURE — 86900 BLOOD TYPING SEROLOGIC ABO: CPT

## 2023-01-10 PROCEDURE — 86780 TREPONEMA PALLIDUM: CPT

## 2023-01-10 PROCEDURE — 6360000002 HC RX W HCPCS: Performed by: OBSTETRICS & GYNECOLOGY

## 2023-01-10 PROCEDURE — 80307 DRUG TEST PRSMV CHEM ANLYZR: CPT

## 2023-01-10 PROCEDURE — 6370000000 HC RX 637 (ALT 250 FOR IP): Performed by: OBSTETRICS & GYNECOLOGY

## 2023-01-10 PROCEDURE — 0UQC7ZZ REPAIR CERVIX, VIA NATURAL OR ARTIFICIAL OPENING: ICD-10-PCS | Performed by: OBSTETRICS & GYNECOLOGY

## 2023-01-10 PROCEDURE — 0UQMXZZ REPAIR VULVA, EXTERNAL APPROACH: ICD-10-PCS | Performed by: OBSTETRICS & GYNECOLOGY

## 2023-01-10 PROCEDURE — 2500000003 HC RX 250 WO HCPCS: Performed by: NURSE ANESTHETIST, CERTIFIED REGISTERED

## 2023-01-10 PROCEDURE — 86850 RBC ANTIBODY SCREEN: CPT

## 2023-01-10 PROCEDURE — 3700000025 EPIDURAL BLOCK: Performed by: ANESTHESIOLOGY

## 2023-01-10 PROCEDURE — 86901 BLOOD TYPING SEROLOGIC RH(D): CPT

## 2023-01-10 RX ORDER — ONDANSETRON 4 MG/1
4 TABLET, ORALLY DISINTEGRATING ORAL EVERY 6 HOURS PRN
Status: DISCONTINUED | OUTPATIENT
Start: 2023-01-10 | End: 2023-01-11 | Stop reason: HOSPADM

## 2023-01-10 RX ORDER — NALOXONE HYDROCHLORIDE 0.4 MG/ML
INJECTION, SOLUTION INTRAMUSCULAR; INTRAVENOUS; SUBCUTANEOUS PRN
Status: DISCONTINUED | OUTPATIENT
Start: 2023-01-10 | End: 2023-01-10

## 2023-01-10 RX ORDER — METHYLERGONOVINE MALEATE 0.2 MG/ML
200 INJECTION INTRAVENOUS PRN
Status: DISCONTINUED | OUTPATIENT
Start: 2023-01-10 | End: 2023-01-10

## 2023-01-10 RX ORDER — ACETAMINOPHEN 325 MG/1
650 TABLET ORAL EVERY 4 HOURS PRN
Status: DISCONTINUED | OUTPATIENT
Start: 2023-01-10 | End: 2023-01-10

## 2023-01-10 RX ORDER — SIMETHICONE 80 MG
80 TABLET,CHEWABLE ORAL EVERY 6 HOURS PRN
Status: DISCONTINUED | OUTPATIENT
Start: 2023-01-10 | End: 2023-01-11 | Stop reason: HOSPADM

## 2023-01-10 RX ORDER — LIDOCAINE HYDROCHLORIDE 10 MG/ML
30 INJECTION, SOLUTION EPIDURAL; INFILTRATION; INTRACAUDAL; PERINEURAL PRN
Status: DISCONTINUED | OUTPATIENT
Start: 2023-01-10 | End: 2023-01-10

## 2023-01-10 RX ORDER — SODIUM CHLORIDE, SODIUM LACTATE, POTASSIUM CHLORIDE, AND CALCIUM CHLORIDE .6; .31; .03; .02 G/100ML; G/100ML; G/100ML; G/100ML
500 INJECTION, SOLUTION INTRAVENOUS PRN
Status: DISCONTINUED | OUTPATIENT
Start: 2023-01-10 | End: 2023-01-10

## 2023-01-10 RX ORDER — OXYCODONE HYDROCHLORIDE 5 MG/1
5 TABLET ORAL EVERY 6 HOURS PRN
Qty: 12 TABLET | Refills: 0 | Status: SHIPPED | OUTPATIENT
Start: 2023-01-10 | End: 2023-01-13

## 2023-01-10 RX ORDER — DOCUSATE SODIUM 100 MG/1
100 CAPSULE, LIQUID FILLED ORAL 2 TIMES DAILY
Status: DISCONTINUED | OUTPATIENT
Start: 2023-01-10 | End: 2023-01-11 | Stop reason: HOSPADM

## 2023-01-10 RX ORDER — IBUPROFEN 800 MG/1
800 TABLET ORAL EVERY 8 HOURS PRN
Qty: 30 TABLET | Refills: 0 | Status: SHIPPED | OUTPATIENT
Start: 2023-01-10

## 2023-01-10 RX ORDER — FERROUS SULFATE 325(65) MG
325 TABLET ORAL DAILY
Status: DISCONTINUED | OUTPATIENT
Start: 2023-01-10 | End: 2023-01-11 | Stop reason: HOSPADM

## 2023-01-10 RX ORDER — SODIUM CHLORIDE 9 MG/ML
INJECTION, SOLUTION INTRAVENOUS PRN
Status: DISCONTINUED | OUTPATIENT
Start: 2023-01-10 | End: 2023-01-11 | Stop reason: HOSPADM

## 2023-01-10 RX ORDER — MISOPROSTOL 100 UG/1
800 TABLET ORAL PRN
Status: DISCONTINUED | OUTPATIENT
Start: 2023-01-10 | End: 2023-01-10

## 2023-01-10 RX ORDER — FAMOTIDINE 20 MG/1
20 TABLET, FILM COATED ORAL 2 TIMES DAILY PRN
Status: DISCONTINUED | OUTPATIENT
Start: 2023-01-10 | End: 2023-01-11 | Stop reason: HOSPADM

## 2023-01-10 RX ORDER — OXYCODONE HYDROCHLORIDE 5 MG/1
5 TABLET ORAL EVERY 4 HOURS PRN
Status: DISCONTINUED | OUTPATIENT
Start: 2023-01-10 | End: 2023-01-11 | Stop reason: HOSPADM

## 2023-01-10 RX ORDER — DOCUSATE SODIUM 100 MG/1
100 CAPSULE, LIQUID FILLED ORAL 2 TIMES DAILY
Status: DISCONTINUED | OUTPATIENT
Start: 2023-01-10 | End: 2023-01-10

## 2023-01-10 RX ORDER — SODIUM CHLORIDE 0.9 % (FLUSH) 0.9 %
5-40 SYRINGE (ML) INJECTION PRN
Status: DISCONTINUED | OUTPATIENT
Start: 2023-01-10 | End: 2023-01-10

## 2023-01-10 RX ORDER — SODIUM CHLORIDE 0.9 % (FLUSH) 0.9 %
5-40 SYRINGE (ML) INJECTION EVERY 12 HOURS SCHEDULED
Status: DISCONTINUED | OUTPATIENT
Start: 2023-01-10 | End: 2023-01-10

## 2023-01-10 RX ORDER — SODIUM CHLORIDE 0.9 % (FLUSH) 0.9 %
5-40 SYRINGE (ML) INJECTION EVERY 12 HOURS SCHEDULED
Status: DISCONTINUED | OUTPATIENT
Start: 2023-01-10 | End: 2023-01-11 | Stop reason: HOSPADM

## 2023-01-10 RX ORDER — SENNA AND DOCUSATE SODIUM 50; 8.6 MG/1; MG/1
1 TABLET, FILM COATED ORAL DAILY PRN
Status: DISCONTINUED | OUTPATIENT
Start: 2023-01-10 | End: 2023-01-11 | Stop reason: HOSPADM

## 2023-01-10 RX ORDER — ONDANSETRON 2 MG/ML
4 INJECTION INTRAMUSCULAR; INTRAVENOUS EVERY 6 HOURS PRN
Status: DISCONTINUED | OUTPATIENT
Start: 2023-01-10 | End: 2023-01-11 | Stop reason: HOSPADM

## 2023-01-10 RX ORDER — FENTANYL/BUPIVACAINE/NS/PF 2-1250MCG
12 PLASTIC BAG, INJECTION (ML) INJECTION CONTINUOUS
Status: DISCONTINUED | OUTPATIENT
Start: 2023-01-10 | End: 2023-01-10

## 2023-01-10 RX ORDER — KETOROLAC TROMETHAMINE 30 MG/ML
30 INJECTION, SOLUTION INTRAMUSCULAR; INTRAVENOUS EVERY 6 HOURS PRN
Status: DISCONTINUED | OUTPATIENT
Start: 2023-01-10 | End: 2023-01-11 | Stop reason: HOSPADM

## 2023-01-10 RX ORDER — OXYCODONE HYDROCHLORIDE 5 MG/1
10 TABLET ORAL EVERY 4 HOURS PRN
Status: DISCONTINUED | OUTPATIENT
Start: 2023-01-10 | End: 2023-01-11 | Stop reason: HOSPADM

## 2023-01-10 RX ORDER — SODIUM CHLORIDE, SODIUM LACTATE, POTASSIUM CHLORIDE, CALCIUM CHLORIDE 600; 310; 30; 20 MG/100ML; MG/100ML; MG/100ML; MG/100ML
INJECTION, SOLUTION INTRAVENOUS CONTINUOUS
Status: DISCONTINUED | OUTPATIENT
Start: 2023-01-10 | End: 2023-01-10

## 2023-01-10 RX ORDER — ONDANSETRON 2 MG/ML
4 INJECTION INTRAMUSCULAR; INTRAVENOUS EVERY 6 HOURS PRN
Status: DISCONTINUED | OUTPATIENT
Start: 2023-01-10 | End: 2023-01-10

## 2023-01-10 RX ORDER — SODIUM CHLORIDE, SODIUM LACTATE, POTASSIUM CHLORIDE, AND CALCIUM CHLORIDE .6; .31; .03; .02 G/100ML; G/100ML; G/100ML; G/100ML
1000 INJECTION, SOLUTION INTRAVENOUS PRN
Status: DISCONTINUED | OUTPATIENT
Start: 2023-01-10 | End: 2023-01-10

## 2023-01-10 RX ORDER — LIDOCAINE HYDROCHLORIDE AND EPINEPHRINE 15; 5 MG/ML; UG/ML
INJECTION, SOLUTION EPIDURAL PRN
Status: DISCONTINUED | OUTPATIENT
Start: 2023-01-10 | End: 2023-01-10 | Stop reason: SDUPTHER

## 2023-01-10 RX ORDER — MODIFIED LANOLIN
OINTMENT (GRAM) TOPICAL PRN
Status: DISCONTINUED | OUTPATIENT
Start: 2023-01-10 | End: 2023-01-11 | Stop reason: HOSPADM

## 2023-01-10 RX ORDER — CARBOPROST TROMETHAMINE 250 UG/ML
250 INJECTION, SOLUTION INTRAMUSCULAR PRN
Status: DISCONTINUED | OUTPATIENT
Start: 2023-01-10 | End: 2023-01-10

## 2023-01-10 RX ORDER — SODIUM CHLORIDE 0.9 % (FLUSH) 0.9 %
5-40 SYRINGE (ML) INJECTION PRN
Status: DISCONTINUED | OUTPATIENT
Start: 2023-01-10 | End: 2023-01-11 | Stop reason: HOSPADM

## 2023-01-10 RX ORDER — SODIUM CHLORIDE 9 MG/ML
25 INJECTION, SOLUTION INTRAVENOUS PRN
Status: DISCONTINUED | OUTPATIENT
Start: 2023-01-10 | End: 2023-01-10

## 2023-01-10 RX ORDER — TERBUTALINE SULFATE 1 MG/ML
0.25 INJECTION, SOLUTION SUBCUTANEOUS
Status: DISCONTINUED | OUTPATIENT
Start: 2023-01-10 | End: 2023-01-10

## 2023-01-10 RX ORDER — IBUPROFEN 800 MG/1
800 TABLET ORAL EVERY 8 HOURS PRN
Status: DISCONTINUED | OUTPATIENT
Start: 2023-01-10 | End: 2023-01-11 | Stop reason: HOSPADM

## 2023-01-10 RX ADMIN — SODIUM CHLORIDE, POTASSIUM CHLORIDE, SODIUM LACTATE AND CALCIUM CHLORIDE: 600; 310; 30; 20 INJECTION, SOLUTION INTRAVENOUS at 10:26

## 2023-01-10 RX ADMIN — Medication 12 ML/HR: at 10:11

## 2023-01-10 RX ADMIN — IBUPROFEN 800 MG: 800 TABLET, FILM COATED ORAL at 21:43

## 2023-01-10 RX ADMIN — Medication 87.3 MILLI-UNITS/MIN: at 13:15

## 2023-01-10 RX ADMIN — SODIUM CHLORIDE, POTASSIUM CHLORIDE, SODIUM LACTATE AND CALCIUM CHLORIDE: 600; 310; 30; 20 INJECTION, SOLUTION INTRAVENOUS at 09:13

## 2023-01-10 RX ADMIN — LIDOCAINE HYDROCHLORIDE AND EPINEPHRINE 5 ML: 15; 5 INJECTION, SOLUTION EPIDURAL at 10:08

## 2023-01-10 RX ADMIN — DOCUSATE SODIUM 100 MG: 100 CAPSULE, LIQUID FILLED ORAL at 21:43

## 2023-01-10 RX ADMIN — ONDANSETRON 4 MG: 2 INJECTION INTRAMUSCULAR; INTRAVENOUS at 09:19

## 2023-01-10 ASSESSMENT — PAIN SCALES - GENERAL: PAINLEVEL_OUTOF10: 4

## 2023-01-10 ASSESSMENT — PAIN - FUNCTIONAL ASSESSMENT: PAIN_FUNCTIONAL_ASSESSMENT: ACTIVITIES ARE NOT PREVENTED

## 2023-01-10 ASSESSMENT — PAIN DESCRIPTION - LOCATION: LOCATION: ABDOMEN

## 2023-01-10 ASSESSMENT — PAIN DESCRIPTION - DESCRIPTORS: DESCRIPTORS: CRAMPING

## 2023-01-10 NOTE — ANESTHESIA PROCEDURE NOTES
Epidural Block    Patient location during procedure: OB  Start time: 1/10/2023 9:50 AM  End time: 1/10/2023 10:08 AM  Reason for block: labor epidural  Staffing  Performed: resident/CRNA   Anesthesiologist: Sarah Ambriz MD  Resident/CRNA: MARTA Blood CRNA  Epidural  Patient position: sitting  Prep: site prepped and draped  Patient monitoring: continuous pulse ox and frequent blood pressure checks  Approach: midline  Location: L3-4  Injection technique: PERCY saline  Provider prep: mask and sterile gloves  Needle  Needle type: Tuohy   Needle gauge: 17 G  Needle length: 3.5 in  Needle insertion depth: 7 cm  Catheter type: multi-orifice  Catheter size: 23 G  Catheter at skin depth: 12 cmCatheter Secured: tegaderm and tape  Assessment  Sensory level: T10  Hemodynamics: stable  Attempts: 1  Outcomes: uncomplicated and patient tolerated procedure well  Additional Notes  Procedure(labor epidural):    Called at 0950 for labor epidural analgesia request. Patient identified, informed consent obtained, and timeout performed. Medical and Surgical history reviewed with pt. Risks/benefits/alternatives of epidural discussed including allergic reaction, infection, bleeding, hypotension, headache, back pain, nerve damage, failed or one-sided block. Also discussed anesthesia options and associated risks in the event of . All questions answered. Verbalizes understanding and requests to proceed. VSS:  Stable throughout      Pt in sitting position. Labor epidural placed using PERCY sterile technique (donned mask, hat, and sterile gloves). Back prepped with Chloraprep x 2. Sterile drape applied. Site: L3-4  PERCY:  7cm. Attempts:  1  Re-directs: 0  Site infiltrated with 3ml 1%Lidocaine(25g). 17G Tuohy needle inserted, PERCY technique with saline. Epidural space dilated with saline. Threaded spring wound epidural catheter through Tuohy needle easily.  No heme, CSF, pain with injection, or paresthesias noted.Tuohy needle withdrawn. Test Dose: 1008 Negative aspiration or pain with injection. 5ml of 1.5% Lido with epi 1:200,000 test dose given. Negative test dose. Skin:  12cm catheter taped at the skin. Secured with steri strips, tegaderm, and tape. Bolus Dose: 10ml of 0.125% bupivacaine with Fentanyl (2ug/ml). Negative aspiration or pain with injection. Given in 2ml increments. Infusion: 0.125% bupivacaine with Fentanyl (2ug/ml)  Auto bolus 4ml every 20 minutes. (Max. Dose- 40 ml/hr.)      Sensory Level:  R:  T10  L:  T10    Motor: 4/5  VAS: start 10/10, end 0/10. Stated comfort and acceptable to patient. Patient in supine/HOB position with left uterine displacement. VSS. Procedure un complicated and patient tolerated it without complaints.       Preanesthetic Checklist  Completed: patient identified, IV checked, site marked, risks and benefits discussed, surgical/procedural consents, equipment checked, pre-op evaluation, timeout performed, anesthesia consent given, oxygen available, monitors applied/VS acknowledged, fire risk safety assessment completed and verbalized and blood product R/B/A discussed and consented

## 2023-01-10 NOTE — H&P
Department of Obstetrics and Gynecology   Obstetrics History and Physical        CHIEF COMPLAINT:  leaking fluid    HISTORY OF PRESENT ILLNESS:      The patient is a 33 y.o. female at 36w4d. OB History          3    Para        Term                AB   2    Living             SAB   2    IAB        Ectopic        Molar        Multiple        Live Births                Patient presents with a chief complaint as above and is being admitted for rupture of membranes.      Estimated Due Date: Estimated Date of Delivery: 23    PRENATAL CARE:    Complicated by: Short cervix - cerclage at 20 wks, removed  at 33 wks due to 3rd trimester bleeding, Anxiety, Polyhydramnios    PAST OB HISTORY:  OB History          3    Para        Term                AB   2    Living             SAB   2    IAB        Ectopic        Molar        Multiple        Live Births                    Past Medical History:        Diagnosis Date    Asthma     Mental disorder     Migraine     Polycystic ovarian syndrome 2019     Past Surgical History:        Procedure Laterality Date    CERVICAL CERCLAGE       Allergies:  Penicillins and Tylenol [acetaminophen]    Social History:    Social History     Socioeconomic History    Marital status: Single     Spouse name: Not on file    Number of children: Not on file    Years of education: Not on file    Highest education level: Not on file   Occupational History    Not on file   Tobacco Use    Smoking status: Never    Smokeless tobacco: Never   Vaping Use    Vaping Use: Never used   Substance and Sexual Activity    Alcohol use: Not Currently    Drug use: Never    Sexual activity: Yes     Partners: Male   Other Topics Concern    Not on file   Social History Narrative    Not on file     Social Determinants of Health     Financial Resource Strain: Not on file   Food Insecurity: Not on file   Transportation Needs: Not on file   Physical Activity: Not on file   Stress: Not on file   Social Connections: Not on file   Intimate Partner Violence: Not on file   Housing Stability: Not on file     Family History:       Problem Relation Age of Onset    High Blood Pressure Mother     Heart Disease Father     High Blood Pressure Father     High Cholesterol Father     Other Father     Migraines Sister     Diabetes Maternal Grandmother     Miscarriages / Stillbirths Maternal Grandmother     Vision Loss Maternal Grandmother         Caused by her diabetes. Diabetes Maternal Grandfather     Hypertension Maternal Grandfather     Cancer Maternal Grandfather         Bladder Cancer    Heart Disease Paternal Grandmother     Diabetes Paternal Grandmother     Hypertension Paternal Grandmother     Miscarriages / Stillbirths Paternal Grandmother     Lung Cancer Paternal Grandfather     Cancer Paternal Grandfather         Lung Cancer.     Stroke Paternal Grandfather      Medications Prior to Admission:  Medications Prior to Admission: sertraline (ZOLOFT) 50 MG tablet, TAKE 1 TABLET BY MOUTH DAILY  progesterone (PROMETRIUM) 200 MG CAPS capsule, Place 200 mg vaginally at bedtime (Patient not taking: Reported on 1/6/2023)  albuterol sulfate HFA (VENTOLIN HFA) 108 (90 Base) MCG/ACT inhaler, Inhale 2 puffs into the lungs 4 times daily as needed for Wheezing  pantoprazole (PROTONIX) 40 MG tablet, Take 1 tablet in the morning before first meal  Prenatal Vit-Fe Fumarate-FA (PRENATAL VITAMIN PO), Take 1 tablet by mouth daily    REVIEW OF SYSTEMS:    CONSTITUTIONAL:  negative  RESPIRATORY:  negative  CARDIOVASCULAR:  negative  GASTROINTESTINAL:  negative  ALLERGIC/IMMUNOLOGIC:  negative  NEUROLOGICAL:  negative  BEHAVIOR/PSYCH:  negative    PHYSICAL EXAM:  Vitals:    01/10/23 0803 01/10/23 0806   BP:  (!) 139/95   Pulse:  97   Resp: 18    Temp: 98 °F (36.7 °C)    TempSrc: Oral    Weight: 209 lb (94.8 kg)      General appearance:  awake, alert, cooperative, no apparent distress, and appears stated age  Neurologic: Awake, alert, oriented to name, place and time. Lungs:  No increased work of breathing, good air exchange  Abdomen:  Soft, non tender, gravid, consistent with her gestational age, EFW by Herman's manouever was 3500 gms   Fetal heart rate:  Reassuring.   Pelvis:  Adequate pelvis  Cervix: 3 cm 100% soft -2  Contraction frequency:  3 minutes    Membranes:  Ruptured clear fluid    ASSESSMENT AND PLAN:    Labor: Admit, anticipate normal delivery, routine labor orders  Fetus: Reassuring  GBS: No  Other: pitocin

## 2023-01-10 NOTE — FLOWSHEET NOTE
Dr Sri Condon at bedside for SVE. Pt complete at 1230. After exam, prolonged fetal decel, down to 90 from baseline 120-125. Pt turned from left to right with return to baseline at this time. MD remains at bedside. Decision for delivery.

## 2023-01-10 NOTE — ANESTHESIA PRE PROCEDURE
Department of Anesthesiology  Preprocedure Note       Name:  Sky Tejada   Age:  33 y.o.  :  1995                                          MRN:  [id number]         Date:  1/10/2023      Surgeon: * No surgeons listed *    Procedure: * No procedures listed *    Medications prior to admission:   Prior to Admission medications    Medication Sig Start Date End Date Taking?  Authorizing Provider   sertraline (ZOLOFT) 50 MG tablet TAKE 1 TABLET BY MOUTH DAILY 22   MARTA Reyes CNP   progesterone (PROMETRIUM) 200 MG CAPS capsule Place 200 mg vaginally at bedtime  Patient not taking: Reported on 2023 9/15/22   Historical Provider, MD   albuterol sulfate HFA (VENTOLIN HFA) 108 (90 Base) MCG/ACT inhaler Inhale 2 puffs into the lungs 4 times daily as needed for Wheezing 22   MARTA Reyes CNP   pantoprazole (PROTONIX) 40 MG tablet Take 1 tablet in the morning before first meal 22   MARTA Reyes CNP   Prenatal Vit-Fe Fumarate-FA (PRENATAL VITAMIN PO) Take 1 tablet by mouth daily    Historical Provider, MD       Current medications:    Current Facility-Administered Medications   Medication Dose Route Frequency Provider Last Rate Last Admin    lactated ringers infusion   IntraVENous Continuous Oral MD Binu 125 mL/hr at 01/10/23 0913 New Bag at 01/10/23 0913    lactated ringers bolus  500 mL IntraVENous PRN Adrián Schmid MD        Or    lactated ringers bolus  1,000 mL IntraVENous PRN Oral MD Binu 495.9 mL/hr at 01/10/23 0913 Rate Verify at 01/10/23 0913    sodium chloride flush 0.9 % injection 5-40 mL  5-40 mL IntraVENous PRN Adrián Schmid MD        0.9 % sodium chloride infusion  25 mL IntraVENous PRN Oral MD Binu        ondansetron [facility]) injection 4 mg  4 mg IntraVENous Q6H PRN Adrián Schmid MD   4 mg at 01/10/23 0919    oxytocin (PITOCIN) 30 units in 500 mL infusion  87.3 andres-units/min IntraVENous Continuous PRN Adrián Schmid MD And    oxytocin (PITOCIN) 10 unit bolus from the bag  10 Units IntraVENous PRN Crispin Romano MD        methylergonovine (METHERGINE) injection 200 mcg  200 mcg IntraMUSCular PRN Crispin Romano MD        carboprost (HEMABATE) injection 250 mcg  250 mcg IntraMUSCular PRN Crispin Romano MD        miSOPROStol (CYTOTEC) tablet 800 mcg  800 mcg Rectal PRN Crispin Romano MD        tranexamic acid (CYKLOKAPRON) 1,000 mg in sodium chloride 0.9 % 60 mL IVPB  1,000 mg IntraVENous Once PRN Crispin Romano MD        acetaminophen (TYLENOL) tablet 650 mg  650 mg Oral Q4H PRN Crispin Romano MD        benzocaine-menthol (DERMOPLAST) 20-0.5 % spray   Topical PRN Crispin Romano MD        lidocaine PF 1 % injection 30 mL  30 mL Other PRN Crispin Romano MD        terbutaline (BRETHINE) injection 0.25 mg  0.25 mg SubCUTAneous Once PRN Crispin Romano MD           Allergies: Allergies   Allergen Reactions    Penicillins Shortness Of Breath, Hives and Rash    Tylenol [Acetaminophen] Hives       Problem List:    Patient Active Problem List   Diagnosis Code    Migraine with aura and without status migrainosus, not intractable G43. 109    Right wrist pain M25.531    Anxiety F41.9    Infection of umbilical cord E10.2    Abscess L02.91    Gastroesophageal reflux disease without esophagitis K21.9    Threatened labor O47.9    Threatened premature labor affecting pregnancy, less than 37 weeks in second trimester, antepartum O47.02     uterine contractions O47.00    Vaginal bleeding during pregnancy O46.90    Threatened labor at term O50.10       Past Medical History:        Diagnosis Date    Asthma     Mental disorder     Migraine     Polycystic ovarian syndrome 2019       Past Surgical History:        Procedure Laterality Date    CERVICAL CERCLAGE         Social History:    Social History     Tobacco Use    Smoking status: Never    Smokeless tobacco: Never   Substance Use Topics    Alcohol use: Not Currently                                Counseling given: Not Answered      Vital Signs (Current):   Vitals:    01/10/23 0803 01/10/23 0806 01/10/23 0917   BP:  (!) 139/95 126/60   Pulse:  97 93   Resp: 18  18   Temp: 36.7 °C (98 °F)     TempSrc: Oral     Weight: 209 lb (94.8 kg)                                                BP Readings from Last 3 Encounters:   01/10/23 126/60   01/06/23 136/89   12/09/22 134/84       NPO Status:                                                                                 BMI:   Wt Readings from Last 3 Encounters:   01/10/23 209 lb (94.8 kg)   01/06/23 209 lb (94.8 kg)   12/01/22 204 lb (92.5 kg)     Body mass index is 37.02 kg/m². CBC:   Lab Results   Component Value Date/Time    WBC 11.6 01/10/2023 09:10 AM    RBC 4.06 01/10/2023 09:10 AM    HGB 11.0 01/10/2023 09:10 AM    HCT 33.5 01/10/2023 09:10 AM    MCV 82.5 01/10/2023 09:10 AM    RDW 14.7 01/10/2023 09:10 AM     01/10/2023 09:10 AM       CMP:   Lab Results   Component Value Date/Time     12/29/2021 08:16 PM    K 4.6 12/29/2021 08:16 PM     12/29/2021 08:16 PM    CO2 24 12/29/2021 08:16 PM    BUN 10 12/29/2021 08:16 PM    CREATININE 0.6 12/29/2021 08:16 PM    GFRAA >60 12/29/2021 08:16 PM    AGRATIO 1.3 12/29/2021 08:16 PM    LABGLOM >60 12/29/2021 08:16 PM    GLUCOSE 96 12/29/2021 08:16 PM    PROT 7.9 12/29/2021 08:16 PM    CALCIUM 9.4 12/29/2021 08:16 PM    BILITOT <0.2 12/29/2021 08:16 PM    ALKPHOS 124 12/29/2021 08:16 PM    AST 34 12/29/2021 08:16 PM    ALT 31 12/29/2021 08:16 PM       POC Tests: No results for input(s): POCGLU, POCNA, POCK, POCCL, POCBUN, POCHEMO, POCHCT in the last 72 hours.     Coags: No results found for: PROTIME, INR, APTT    HCG (If Applicable): No results found for: PREGTESTUR, PREGSERUM, HCG, HCGQUANT     ABGs: No results found for: PHART, PO2ART, IDE1GFD, MGO2KJW, BEART, A2MEZGPU     Type & Screen (If Applicable):  No results found for: LABABO, [facility]    Drug/Infectious Status (If Applicable):  No results found for: HIV, HEPCAB    COVID-19 Screening (If Applicable): No results found for: COVID19        Anesthesia Evaluation  Patient summary reviewed and Nursing notes reviewed  Airway: Mallampati: II  TM distance: >3 FB   Neck ROM: full  Mouth opening: > = 3 FB   Dental: normal exam         Pulmonary:normal exam  breath sounds clear to auscultation  (+) asthma: allergic asthma,           Patient did not smoke on day of surgery. Cardiovascular:Negative CV ROS  Exercise tolerance: good (>4 METS),         NYHA Classification: I    Rhythm: regular  Rate: normal           Beta Blocker:  Not on Beta Blocker         Neuro/Psych:   (+) headaches: migraine headaches, psychiatric history: stable with treatmentdepression/anxiety             GI/Hepatic/Renal:   (+) GERD: no interval change,           Endo/Other: Negative Endo/Other ROS             Pt had no PAT visit       Abdominal:             Vascular: negative vascular ROS. Other Findings:           Anesthesia Plan      epidural     ASA 2             Anesthetic plan and risks discussed with patient. Use of blood products discussed with patient whom consented to blood products. Plan discussed with attending. Patient request pain control for labor and delivery. Discussed procedure (epidural,spinal, general and non regional options) and  options. Alternatives, benefits, risks, including but not limited to changes in VSS, allergic reaction, infection, intravascular injection, paresthesia, n/v, severe headache, temporary or permanent rare neurologic sequelae, and failed block. All questions answered and states understanding. Patient agrees to proceed. Choice of anesthetic will be determined by clinical condition at the time of anesthesia initiation.         Efraín Butts, APRN - CRNA   1/10/2023

## 2023-01-10 NOTE — FLOWSHEET NOTE
Assisted pt to bathroom. Gait steady with stand by assist. Pt unable to void. Pt instructed on perineal care and self administration of perineal meds. Pt verbalized understanding and may self medicate. Pt instructed to keep medications out of the reach of children.

## 2023-01-10 NOTE — DISCHARGE SUMMARY
Obstetrical Discharge Form    Gestational Age: 36w4d    Antepartum complications: Anxiety, Short cervix s/p cerclage, polyhydramnios    Date of Delivery: 1/10/23      Type of Delivery: vaginal, spontaneous    Delivered By: [facility]     Baby:      Information for the patient's :  Charisma Bravo [[id number]]   APGAR One: 9   Information for the patient's :  Nurysbrett Nelson [[id number]]   APGAR Five: 9   Information for the patient's :  Nurys Nelson [[id number]]   Birth Weight: N/A     Anesthesia: Epidural    Intrapartum complications: None    Postpartum complications: none    Discharge Medication:      Medication List        START taking these medications      ibuprofen 800 MG tablet  Commonly known as: ADVIL;MOTRIN  Take 1 tablet by mouth every 8 hours as needed for Pain     oxyCODONE 5 MG immediate release tablet  Commonly known as: Roxicodone  Take 1 tablet by mouth every 6 hours as needed for Pain for up to 3 days. Intended supply: 3 days. Take lowest dose possible to manage pain Max Daily Amount: 20 mg            STOP taking these medications      albuterol sulfate  (90 Base) MCG/ACT inhaler  Commonly known as: Ventolin HFA     pantoprazole 40 MG tablet  Commonly known as: Protonix     PRENATAL VITAMIN PO     progesterone 200 MG Caps capsule  Commonly known as: PROMETRIUM     sertraline 50 MG tablet  Commonly known as: Zoloft               Where to Get Your Medications        You can get these medications from any pharmacy    Bring a paper prescription for each of these medications  ibuprofen 800 MG tablet  oxyCODONE 5 MG immediate release tablet         Discharge Condition:  stable    Discharge Date: 2023    PLAN:  Follow up in 6 weeks for routine PP visit  All questions answered  D/C summary begun at delivery for D/C planning purposes, any delay in discharge from ordered D/C date due to  factors.

## 2023-01-10 NOTE — FLOWSHEET NOTE
Patient actively pushing starting at 1236. RN and MD remain in continuous attendance at the bedside. Assessment & evaluation of fetal heart rate ongoing via continuous EFM. Delivery of viable male infant at 56 via  per Dr Jarrod Abraham and medical student. Infant with tight nuchal around neck, reduced then body delivered. Infant with lusty cry. Dried and stimulated, stopped crying so taken to radiant warmer for further assessment at 1305. Placenta delivered spontaneously per Dr Jarrod Abraham. Repair of bilateral labial and cervical tear performed per MD  Final count done per MD and RN, and verified as correct.  Vaginal sweep performed per MD

## 2023-01-10 NOTE — PROCEDURES
Department of Obstetrics and Gynecology  Spontaneous Vaginal Delivery Note         Pre-operative Diagnosis:  Term pregnancy    Post-operative Diagnosis:  Same, delivered    Procedure:  Spontaneous vaginal delivery    Surgeon:  Cb Swanson MD    Information for the patient's :  Pauly Lopez [[id number]]     Kwadwo cantrellto, Baby Boy Jasmeet Byrd [[id number]]      Apgars    Living status: Living  Apgars   1 Minute:  5 Minute:  10 Minute 15 Minute 20 Minute   Skin Color: 1  1       Heart Rate: 2  2       Reflex Irritability: 2  2       Muscle Tone: 2  2       Respiratory Effort: 2  2       Total: 9  9               Apgars Assigned By: Michael Marquez RN               Information for the patient's :  Pauly Lopez [[id number]]   Birth Weight: N/A     Anesthesia:  epidural anesthesia    Estimated blood loss:  300ml    Specimen:  Placenta not sent to pathology     Cord gas sent No    Complications:  none    Condition:  infant stable to general nursery and mother stable    Details of Procedure: The patient is a 33 y.o. female at 36w4d   OB History          3    Para        Term                AB   2    Living             SAB   2    IAB        Ectopic        Molar        Multiple        Live Births                 who was admitted for PROM. She received the following interventions: IV Pitocin augmentation. The patient progressed  did receive an epidural, became complete and started to push. Previously noted cervical band spontaneously lacerated. After pushing for 30  min the fetal head was at the perineum, the nose and mouth suctioned with bulb suction and the rest of the infant delivered atraumatically, and was placed on the mother's abdomen. The cord was clamped and cut after 1 minute delay. The delivery of the placenta was spontaneous. The perineum and vagina were explored and a bilateral labial and cervical laceration was repaired in standard fashion.

## 2023-01-11 VITALS
BODY MASS INDEX: 37.02 KG/M2 | SYSTOLIC BLOOD PRESSURE: 134 MMHG | HEART RATE: 93 BPM | DIASTOLIC BLOOD PRESSURE: 88 MMHG | RESPIRATION RATE: 16 BRPM | WEIGHT: 209 LBS | OXYGEN SATURATION: 97 % | TEMPERATURE: 97.8 F

## 2023-01-11 PROBLEM — O47.00 PRETERM UTERINE CONTRACTIONS: Status: RESOLVED | Noted: 2022-12-07 | Resolved: 2023-01-11

## 2023-01-11 PROBLEM — O47.9 THREATENED LABOR: Status: RESOLVED | Noted: 2022-11-21 | Resolved: 2023-01-11

## 2023-01-11 PROBLEM — O46.90 VAGINAL BLEEDING DURING PREGNANCY: Status: RESOLVED | Noted: 2022-12-08 | Resolved: 2023-01-11

## 2023-01-11 PROBLEM — O47.02 THREATENED PREMATURE LABOR AFFECTING PREGNANCY, LESS THAN 37 WEEKS IN SECOND TRIMESTER, ANTEPARTUM: Status: RESOLVED | Noted: 2022-12-01 | Resolved: 2023-01-11

## 2023-01-11 PROCEDURE — 6370000000 HC RX 637 (ALT 250 FOR IP): Performed by: OBSTETRICS & GYNECOLOGY

## 2023-01-11 RX ADMIN — IBUPROFEN 800 MG: 800 TABLET, FILM COATED ORAL at 08:56

## 2023-01-11 RX ADMIN — DOCUSATE SODIUM 100 MG: 100 CAPSULE, LIQUID FILLED ORAL at 08:56

## 2023-01-11 ASSESSMENT — PAIN SCALES - GENERAL: PAINLEVEL_OUTOF10: 2

## 2023-01-11 NOTE — DISCHARGE INSTRUCTIONS
Thank you for the opportunity to care for you and your family. We hope that you are happy with the care we provided during your stay in the [facility]. We want to ensure that you have the help you need when you leave the hospital. If there is anything we can assist you with, please let us know. Breastfeeding mothers may contact our lactation specialists with any problems or questions. [facility] lactation services phone number is [phone]. Leave a message and your call will be returned. Please refer to the information provided in the \"Caring for Yourself\" tab in your discharge binder (Guidelines for [employer]). The following are warning signs to remember. Call 911 if you have:    Chest pain or pressure  Shortness of breath, even at rest  Thoughts of harming yourself or your baby  Seizures    Call your healthcare provider if you have:    Temperature of 100.4 degrees or higher  Stitches that are not healing        -- Swelling, bleeding, drainage, foul odor, redness or warmth in/around your           stitches, staples, or incision (scar)        -- Bad smelling blood or discharge from the vagina  Vaginal bleeding that has increased         -- Soaking through one pad in an hour        -- You are passing clots larger than the size of a lemon  Red, warm tender area(s) in your breast or calf  Headache that does not get better, even after taking medicine; or headache with vision changes    Remember to notify all healthcare providers from your date of delivery to up to one year after giving birth! CARING FOR YOURSELF        DIET/ACTIVITY    Eat a well balanced diet focusing on foods high in fiber and protein. Drink plenty of fluids, especially water. To avoid constipation you may take a mild stool softener as recommended by your doctor or midwife. Gradually increase your activity. Resume an exercise regime only after being advised by your doctor or midwife.   When sitting or lying down, keep your legs elevated to reduce swelling. Avoid lifting anything heavier than your baby or a gallon of milk. Avoid driving for two weeks or while taking narcotics. No sexual intercourse for 6 weeks, or until advised by your OB provider. Nothing in the vagina: intercourse, tampons or douching. Be prepared to discuss family planning at your follow up OB visit. If your feel tired and have a lack of energy, you may continue to take your prenatal vitamins. Nap when your baby naps to catch on sleep. EMOTIONS    You may feel childress, sad, teary and overwhelmed. Contact your OB provider if you think you may be showing signs of postpartum depression. Please refer to the [place] [address] tab in your discharge folder. If your baby will not stop crying, contact another adult to help or place the baby in its crib on its back and take a break. Never shake your baby! IRIS CARE     Vaginal bleeding will decrease in amount over the next few weeks. Cleanse your perineum from front to back using the iris-bottle after toileting until bleeding stops. You will notice that as your activity increases, your flow may also increase. This is a sign that you need to rest more often. Call your OB provider if you are saturating more than 1 maxi pad an hour and resting does not help. If used, stiches will dissolve in 4-6 weeks. To ease pain or swelling, use prescribed medications properly or use a sitz bath, if ordered. Kegel exercises will help to restore bladder control. BREAST CARE    FOR BREASTFEEDING MOMS:    If you become engorged, feeding may be more difficult or painful in 1 to 2 days. You may find it helpful to hand express some milk so that the infant can latch on more easily. While breastfeeding continue to take your prenatal vitamins as directed. Refer to the breastfeeding information in the discharge folder.       FOR NON-BREASTFEEDING MOMS:    You may apply ice packs to your breasts over your bra for 20 minutes at a time for comfort. Do not express breast milk. Avoid stimulation to your breasts. When showering, allow the water to strike only your back. Wear a good fitting bra, such as a sports bra, until your milk dries up    [id number]    Your abdomen is tender to the touch or you have pain that cannot be relieved. Flu-like symptoms such as achy muscles and joints or you are experiencing extreme weakness or dizziness. Persistent burning or increased urgency in urination.

## 2023-01-11 NOTE — ANESTHESIA POSTPROCEDURE EVALUATION
Department of Anesthesiology  Postprocedure Note    Patient: Nuria Neil  MRN: [id number]  YOB: 1995  Date of evaluation: 1/11/2023      Procedure Summary     Date: 01/10/23 Room / Location:     Anesthesia Start: 0950 Anesthesia Stop: 6536    Procedure: Labor Analgesia Diagnosis:     Scheduled Providers:  Responsible Provider: Eliot Christina MD    Anesthesia Type: epidural ASA Status: 2          Anesthesia Type: No value filed.     Carolina Phase I: Carolina Score: 9    Carolina Phase II: Carolina Score: 10      Anesthesia Post Evaluation    Patient location during evaluation: bedside  Patient participation: complete - patient participated  Level of consciousness: awake and alert  Airway patency: patent  Nausea & Vomiting: no nausea and no vomiting  Complications: no  Cardiovascular status: blood pressure returned to baseline  Respiratory status: acceptable  Hydration status: stable

## 2023-01-11 NOTE — LACTATION NOTE
This note was copied from a baby's chart. Lactation Progress Note      Data:   Consult reason states first baby. LC received permission to enter. FOB holding sleeping NB. Mother states they have decided to exclusive feed formula. Mother states she has never taken a breastfeeding class. Action: LC offered to answer any breastfeeding or pumping questions. LC discussed the benefits of breastfeeding or pumping. LC also discussed and provided the following:  Normal NB first 24 hrs  Hunger Cues  Five Keys  [employer] handout  [employer] flyer  [facility] all-inclusive booklet  [employer] card  Protecting Breastfeeding  Exclusive Pumping    Mother informed that she can call [employer] for breastfeeding assistance if she desires. LC dicussed many NB's do not feed well on the first day and to not base her decision to stop breastfeeding based on what NB is doing when NB is less than 24 hrs old. Response: Mother did not state whether she will or will not call [employer] for next feeding. NB remains asleep in FOB arms. FOB did not speak during consultation. Mother looking away as [employer] speaking.

## 2023-01-11 NOTE — PLAN OF CARE
Problem: Pain  Goal: Verbalizes/displays adequate comfort level or baseline comfort level  Outcome: Progressing  Flowsheets (Taken 1/10/2023 1517 by Eloy Cunningham RN)  Verbalizes/displays adequate comfort level or baseline comfort level: Encourage patient to monitor pain and request assistance     Problem: Postpartum  Goal: Experiences normal postpartum course  Outcome: Progressing  Goal: Appropriate maternal -  bonding  Outcome: Progressing  Goal: Establishment of infant feeding pattern  Outcome: Progressing

## 2023-01-11 NOTE — PROGRESS NOTES
[facility] post-partum note    Post-partum Day #1    Subjective:    Pt is doing well. Pain well controlled. Tolerating normal diet. Passing gas. Ambulating well. Bottle feeding. Would like baby to get circumcised. Lochia: Normal    Objective:  Vitals:    01/10/23 1517 01/10/23 1736 01/10/23 2130 01/11/23 0130   BP: 136/83 (!) 143/88 136/87 109/64   Pulse: 84 96 (!) 102 93   Resp: 16 16 16 16   Temp: 98.2 °F (36.8 °C) 98 °F (36.7 °C) 98.8 °F (37.1 °C) 98.6 °F (37 °C)   TempSrc: Oral Oral Oral Oral   SpO2:   96% 97%   Weight:           Physical Examination:  Appears well  Uterus: Firm, NT  Calves: NT    Labs:    Recent Labs     01/10/23  0910   WBC 11.6*   HGB 11.0*   HCT 33.5*        No results for input(s): NA, K, CL, CO2, BUN, CREATININE, CALCIUM, AST, ALT in the last 72 hours.     Invalid input(s): LOPEZ      Current Facility-Administered Medications:     sodium chloride flush 0.9 % injection 5-40 mL, 5-40 mL, IntraVENous, 2 times per day, Sp Emmanuel MD    sodium chloride flush 0.9 % injection 5-40 mL, 5-40 mL, IntraVENous, PRN, Sp Emmanuel MD    0.9 % sodium chloride infusion, , IntraVENous, PRN, Sp Emmanuel MD    ibuprofen (ADVIL;MOTRIN) tablet 800 mg, 800 mg, Oral, Q8H PRN, Sp Emmanuel MD, 800 mg at 01/10/23 2143    ketorolac (TORADOL) injection 30 mg, 30 mg, IntraVENous, Q6H PRN, Sp Emmanuel MD    oxyCODONE (ROXICODONE) immediate release tablet 5 mg, 5 mg, Oral, Q4H PRN **OR** oxyCODONE (ROXICODONE) immediate release tablet 10 mg, 10 mg, Oral, Q4H PRN, Sp Emmanuel MD    ondansetron (ZOFRAN) injection 4 mg, 4 mg, IntraVENous, Q6H PRN, Sp Emmanuel MD    ondansetron (ZOFRAN-ODT) disintegrating tablet 4 mg, 4 mg, Oral, Q6H PRN, Sp Emmanuel MD    famotidine (PEPCID) tablet 20 mg, 20 mg, Oral, BID PRN, Sp Emmanuel MD    simethicone (MYLICON) chewable tablet 80 mg, 80 mg, Oral, Q6H PRN, Sp Emmanuel MD    docusate sodium (COLACE) capsule 100 mg, 100 mg, Oral, BID, Isidra Anderson MD, 100 mg at 01/10/23 2143    magnesium hydroxide (MILK OF MAGNESIA) 400 MG/5ML suspension 30 mL, 30 mL, Oral, Daily PRN, Isidra Anderson MD    sennosides-docusate sodium (SENOKOT-S) 8.6-50 MG tablet 1 tablet, 1 tablet, Oral, Daily PRN, Isidra Anderson MD    lansinoh lanolin ointment, , Topical, PRN, Georganne Ebbs, MD    witch hazel-glycerin (TUCKS) pad, , Topical, PRN, Isidra Anderson MD    hydrocortisone 2.5 % cream, , Topical, Q2H PRN, Isidra Anderson MD    benzocaine-menthol (DERMOPLAST) 20-0.5 % spray, , Topical, PRN, Isidra Anderson MD    ferrous sulfate (IRON 325) tablet 325 mg, 325 mg, Oral, Daily, Isidra Anderson MD     Assessment/Plan:      Post Partum: advance Postpartum care  Discharge today

## 2023-02-01 SDOH — ECONOMIC STABILITY: INCOME INSECURITY: HOW HARD IS IT FOR YOU TO PAY FOR THE VERY BASICS LIKE FOOD, HOUSING, MEDICAL CARE, AND HEATING?: SOMEWHAT HARD

## 2023-02-01 SDOH — ECONOMIC STABILITY: HOUSING INSECURITY
IN THE LAST 12 MONTHS, WAS THERE A TIME WHEN YOU DID NOT HAVE A STEADY PLACE TO SLEEP OR SLEPT IN A SHELTER (INCLUDING NOW)?: NO

## 2023-02-01 SDOH — ECONOMIC STABILITY: FOOD INSECURITY: WITHIN THE PAST 12 MONTHS, THE FOOD YOU BOUGHT JUST DIDN'T LAST AND YOU DIDN'T HAVE MONEY TO GET MORE.: NEVER TRUE

## 2023-02-01 SDOH — ECONOMIC STABILITY: FOOD INSECURITY: WITHIN THE PAST 12 MONTHS, YOU WORRIED THAT YOUR FOOD WOULD RUN OUT BEFORE YOU GOT MONEY TO BUY MORE.: NEVER TRUE

## 2023-02-03 ENCOUNTER — OFFICE VISIT (OUTPATIENT)
Dept: PRIMARY CARE CLINIC | Age: 28
End: 2023-02-03
Payer: COMMERCIAL

## 2023-02-03 VITALS
WEIGHT: 188 LBS | DIASTOLIC BLOOD PRESSURE: 84 MMHG | HEIGHT: 63 IN | TEMPERATURE: 97.2 F | HEART RATE: 105 BPM | OXYGEN SATURATION: 98 % | SYSTOLIC BLOOD PRESSURE: 120 MMHG | BODY MASS INDEX: 33.31 KG/M2

## 2023-02-03 DIAGNOSIS — F41.9 ANXIETY: Primary | ICD-10-CM

## 2023-02-03 DIAGNOSIS — K21.9 GASTROESOPHAGEAL REFLUX DISEASE WITHOUT ESOPHAGITIS: ICD-10-CM

## 2023-02-03 DIAGNOSIS — F32.1 CURRENT MODERATE EPISODE OF MAJOR DEPRESSIVE DISORDER WITHOUT PRIOR EPISODE (HCC): ICD-10-CM

## 2023-02-03 DIAGNOSIS — G43.109 MIGRAINE WITH AURA AND WITHOUT STATUS MIGRAINOSUS, NOT INTRACTABLE: ICD-10-CM

## 2023-02-03 PROCEDURE — 99214 OFFICE O/P EST MOD 30 MIN: CPT | Performed by: NURSE PRACTITIONER

## 2023-02-03 RX ORDER — PANTOPRAZOLE SODIUM 40 MG/1
40 TABLET, DELAYED RELEASE ORAL DAILY
COMMUNITY
End: 2023-02-03 | Stop reason: SDUPTHER

## 2023-02-03 RX ORDER — IBUPROFEN 800 MG/1
800 TABLET ORAL EVERY 8 HOURS PRN
Qty: 90 TABLET | Refills: 0 | Status: SHIPPED | OUTPATIENT
Start: 2023-02-03 | End: 2023-03-05

## 2023-02-03 RX ORDER — PANTOPRAZOLE SODIUM 40 MG/1
40 TABLET, DELAYED RELEASE ORAL DAILY
Qty: 90 TABLET | Refills: 0 | Status: SHIPPED | OUTPATIENT
Start: 2023-02-03 | End: 2023-05-04

## 2023-02-03 SDOH — ECONOMIC STABILITY: INCOME INSECURITY: HOW HARD IS IT FOR YOU TO PAY FOR THE VERY BASICS LIKE FOOD, HOUSING, MEDICAL CARE, AND HEATING?: NOT HARD AT ALL

## 2023-02-03 SDOH — ECONOMIC STABILITY: FOOD INSECURITY: WITHIN THE PAST 12 MONTHS, YOU WORRIED THAT YOUR FOOD WOULD RUN OUT BEFORE YOU GOT MONEY TO BUY MORE.: NEVER TRUE

## 2023-02-03 SDOH — ECONOMIC STABILITY: FOOD INSECURITY: WITHIN THE PAST 12 MONTHS, THE FOOD YOU BOUGHT JUST DIDN'T LAST AND YOU DIDN'T HAVE MONEY TO GET MORE.: NEVER TRUE

## 2023-02-03 ASSESSMENT — PATIENT HEALTH QUESTIONNAIRE - PHQ9
6. FEELING BAD ABOUT YOURSELF - OR THAT YOU ARE A FAILURE OR HAVE LET YOURSELF OR YOUR FAMILY DOWN: 1
SUM OF ALL RESPONSES TO PHQ QUESTIONS 1-9: 4
7. TROUBLE CONCENTRATING ON THINGS, SUCH AS READING THE NEWSPAPER OR WATCHING TELEVISION: 0
SUM OF ALL RESPONSES TO PHQ9 QUESTIONS 1 & 2: 0
8. MOVING OR SPEAKING SO SLOWLY THAT OTHER PEOPLE COULD HAVE NOTICED. OR THE OPPOSITE, BEING SO FIGETY OR RESTLESS THAT YOU HAVE BEEN MOVING AROUND A LOT MORE THAN USUAL: 0
4. FEELING TIRED OR HAVING LITTLE ENERGY: 1
3. TROUBLE FALLING OR STAYING ASLEEP: 1
5. POOR APPETITE OR OVEREATING: 1
1. LITTLE INTEREST OR PLEASURE IN DOING THINGS: 0
SUM OF ALL RESPONSES TO PHQ QUESTIONS 1-9: 4
2. FEELING DOWN, DEPRESSED OR HOPELESS: 0
SUM OF ALL RESPONSES TO PHQ QUESTIONS 1-9: 4
9. THOUGHTS THAT YOU WOULD BE BETTER OFF DEAD, OR OF HURTING YOURSELF: 0
10. IF YOU CHECKED OFF ANY PROBLEMS, HOW DIFFICULT HAVE THESE PROBLEMS MADE IT FOR YOU TO DO YOUR WORK, TAKE CARE OF THINGS AT HOME, OR GET ALONG WITH OTHER PEOPLE: 1
SUM OF ALL RESPONSES TO PHQ QUESTIONS 1-9: 4

## 2023-02-03 ASSESSMENT — ANXIETY QUESTIONNAIRES
7. FEELING AFRAID AS IF SOMETHING AWFUL MIGHT HAPPEN: 0
5. BEING SO RESTLESS THAT IT IS HARD TO SIT STILL: 0
3. WORRYING TOO MUCH ABOUT DIFFERENT THINGS: 0
GAD7 TOTAL SCORE: 3
IF YOU CHECKED OFF ANY PROBLEMS ON THIS QUESTIONNAIRE, HOW DIFFICULT HAVE THESE PROBLEMS MADE IT FOR YOU TO DO YOUR WORK, TAKE CARE OF THINGS AT HOME, OR GET ALONG WITH OTHER PEOPLE: SOMEWHAT DIFFICULT
1. FEELING NERVOUS, ANXIOUS, OR ON EDGE: 1
4. TROUBLE RELAXING: 0
2. NOT BEING ABLE TO STOP OR CONTROL WORRYING: 1
6. BECOMING EASILY ANNOYED OR IRRITABLE: 1

## 2023-02-03 ASSESSMENT — ENCOUNTER SYMPTOMS
VOMITING: 0
NAUSEA: 0
SHORTNESS OF BREATH: 0
DIARRHEA: 0
CONSTIPATION: 0
GASTROINTESTINAL NEGATIVE: 1
CHEST TIGHTNESS: 0
ABDOMINAL DISTENTION: 0
ABDOMINAL PAIN: 0

## 2023-02-03 NOTE — PROGRESS NOTES
2/3/2023    Chief Complaint   Patient presents with    Follow-up     3 months for  anxiety, migraine headaches, GERD. Siddhartha Forde is a 33 y.o. female, presents today for 3 month follow up of Anxiety, Migraine Headaches, GERD. HPI   Anxiety and Depression  Current medication: Zoloft 50 mg daily. Greatly improved with medication. Denies anxiety and depression symptoms. She is taking Zoloft as prescribed and tolerating well. She denies medication side effects. She reports reports feeling \"good\". She is enjoying her 2 month old , Diana Schuster and has adjusted well to being a new mom, however reports \"a little mom anxiety\". She would like to increase Zoloft today. - Will increase Zoloft from 50 mg daily to 75 mg daily. She denies homicidal and suicidal ideation and intent. Note:  Zoloft approved by OB/GYN to take during pregnancy. CARLOS 7 SCORE 2/3/2023 2022 2022   CARLOS-7 Total Score 3 2 0       PHQ Scores 2/3/2023 2022 2022 2022 3/10/2022 2021 2/10/2021   PHQ2 Score 0 2 0 0 0 0 0   PHQ9 Score 4 6 0 0 0 0 0         Migraine headaches  Patient has not taken metoprolol (Lopressor) or Imitrex since pregnancy. She reports a couple of migraine headaches in the past 1 month due to lack of sleep. She takes Ibuprofen 800 mg at onset of headache, which helps dull pain. Reports second dose 8 hours later resolves headache. GERD  Patient is taking Protonix 40 mg daily. No longer eating spicy foods and BBQ sauce due to heartburn. - Previous PPI includes Omeprazole- ineffective.          Current Outpatient Medications on File Prior to Visit   Medication Sig Dispense Refill    progesterone (PROMETRIUM) 200 MG CAPS capsule Place 200 mg vaginally at bedtime      ondansetron (ZOFRAN) 4 MG tablet Take 2 tablets by mouth daily as needed for Nausea or Vomiting 30 tablet 0    albuterol sulfate HFA (VENTOLIN HFA) 108 (90 Base) MCG/ACT inhaler Inhale 2 puffs into the lungs 4 times daily as needed for Wheezing 18 g 5    pantoprazole (PROTONIX) 40 MG tablet Take 1 tablet in the morning before first meal 90 tablet 0    metoprolol tartrate (LOPRESSOR) 50 MG tablet Take 1 tablet by mouth in the morning and 1 tablet before bedtime. 180 tablet 0    sertraline (ZOLOFT) 50 MG tablet TAKE 1 TABLET BY MOUTH DAILY 90 tablet 0    Prenatal Vit-Fe Fumarate-FA (PRENATAL VITAMIN PO) Take 1 tablet by mouth daily       No current facility-administered medications on file prior to visit. Review of Systems   Constitutional:  Negative for diaphoresis and fatigue. Respiratory:  Negative for chest tightness and shortness of breath. Cardiovascular:  Negative for chest pain, palpitations and leg swelling. Gastrointestinal: Negative. Negative for abdominal distention, abdominal pain, constipation, diarrhea, nausea and vomiting. Musculoskeletal:  Negative for arthralgias, gait problem and myalgias. Skin: Negative. Neurological:  Positive for headaches (\"a couple\" migraine headaches in past 1 month). Negative for dizziness, tremors, seizures, weakness and light-headedness. Psychiatric/Behavioral:  Negative for confusion, decreased concentration, dysphoric mood, hallucinations, self-injury, sleep disturbance and suicidal ideas. The patient is nervous/anxious.          Allergies   Allergen Reactions    Penicillins Shortness Of Breath, Hives and Rash    Tylenol [Acetaminophen] Hives     Past Medical History:   Diagnosis Date    Asthma     Mental disorder     Migraine     Polycystic ovarian syndrome 12/01/2019     Past Surgical History:   Procedure Laterality Date    CERVICAL CERCLAGE        Social History     Tobacco Use    Smoking status: Never    Smokeless tobacco: Never   Substance Use Topics    Alcohol use: Not Currently      Family History   Problem Relation Age of Onset    High Blood Pressure Mother     Heart Disease Father     High Blood Pressure Father     High Cholesterol Father Other Father     Migraines Sister     Diabetes Maternal Grandmother     Miscarriages / Stillbirths Maternal Grandmother     Vision Loss Maternal Grandmother         Caused by her diabetes. Diabetes Maternal Grandfather     Hypertension Maternal Grandfather     Cancer Maternal Grandfather         Bladder Cancer    Heart Disease Paternal Grandmother     Diabetes Paternal Grandmother     Hypertension Paternal Grandmother     Miscarriages / Stillbirths Paternal Grandmother     Lung Cancer Paternal Grandfather     Cancer Paternal Grandfather         Lung Cancer. Stroke Paternal Grandfather         Vitals:    02/03/23 1328   BP: 120/84   Site: Left Upper Arm   Position: Sitting   Cuff Size: Medium Adult   Pulse: (!) 105   Temp: 97.2 °F (36.2 °C)   SpO2: 98%   Weight: 188 lb (85.3 kg)   Height: 5' 3\" (1.6 m)         Estimated body mass index is 33.3 kg/m² as calculated from the following:    Height as of this encounter: 5' 3\" (1.6 m). Weight as of this encounter: 188 lb (85.3 kg). Physical Exam  Vitals and nursing note reviewed. Constitutional:       General: She is not in acute distress. Appearance: Normal appearance. She is well-developed. Neck:      Vascular: No carotid bruit. Cardiovascular:      Rate and Rhythm: Normal rate and regular rhythm. Pulses: Normal pulses. Heart sounds: Normal heart sounds, S1 normal and S2 normal.   Pulmonary:      Effort: Pulmonary effort is normal.      Breath sounds: Normal breath sounds. Musculoskeletal:         General: Normal range of motion. Cervical back: Normal range of motion and neck supple. Right lower leg: No edema. Left lower leg: No edema. Lymphadenopathy:      Cervical: No cervical adenopathy. Skin:     General: Skin is warm. Neurological:      General: No focal deficit present. Mental Status: She is alert and oriented to person, place, and time.    Psychiatric:         Attention and Perception: Attention normal. Mood and Affect: Mood and affect normal.         Speech: Speech normal.         Behavior: Behavior normal.         Thought Content: Thought content normal. Thought content is not paranoid or delusional. Thought content does not include homicidal or suicidal ideation. Thought content does not include homicidal or suicidal plan. Cognition and Memory: Cognition normal.         Assessment and Plan:  1. Anxiety  - Active  -CARLOS-7 score: 3 (up from 2, 0)  - Start sertraline (ZOLOFT) 50 MG tablet; Take 1.5 tablets by mouth daily  Dispense: 45 tablet; Refill: 1  -Increase Zoloft from 50 mg daily to 75 mg    2. Current moderate episode of major depressive disorder without prior episode (HCC)  - Stable  -PHQ-9 score: 4 (down from 6, 0)  - Start sertraline (ZOLOFT) 50 MG tablet; Take 1.5 tablets by mouth daily  Dispense: 45 tablet; Refill: 1  -Increase Zoloft from 50 mg daily to 75 mg    3. Migraine with aura and without status migrainosus, not intractable  - Stable   - Continue ibuprofen (ADVIL;MOTRIN) 800 MG tablet; Take 1 tablet by mouth every 8 hours as needed (headache)  Dispense: 90 tablet; Refill: 0    4. Gastroesophageal reflux disease without esophagitis  - Controlled  - Continued pantoprazole (PROTONIX) 40 MG tablet; Take 1 tablet by mouth daily  Dispense: 90 tablet; Refill: 0       Return in about 3 weeks (around 2/24/2023) for 3 week follow up of anxiety, depression (increased Zoloft) , migraine headaches. Discussed use, benefit, and side effects of prescribed medications. Patient's questions answered and concerns addressed. Patient agrees to plan of care. My scheduled days in the office reviewed with patient, and same day appointments available. Encouraged to use [employer] for communication as needed. Electronically signed by MARTA Nation CNP on 2/26/2023 at 10:09 AM       This dictation was generated by voice recognition computer software.  Although all attempts are made to edit the dictation for accuracy, there may be errors in the transcription that are not intended.

## 2023-02-17 DIAGNOSIS — F41.9 ANXIETY DISORDER, UNSPECIFIED: ICD-10-CM

## 2023-02-24 ENCOUNTER — TELEPHONE (OUTPATIENT)
Dept: PRIMARY CARE CLINIC | Age: 28
End: 2023-02-24

## 2023-02-24 ENCOUNTER — PATIENT MESSAGE (OUTPATIENT)
Dept: PRIMARY CARE CLINIC | Age: 28
End: 2023-02-24

## 2023-02-24 NOTE — TELEPHONE ENCOUNTER
1st call, no answer, a voice msg was left for pt to call back the office to get her ready for the Vv.

## 2023-02-24 NOTE — TELEPHONE ENCOUNTER
2nd call, no answer, a voice msg was left for pt to call back the office to get her ready for the Vv.

## 2023-02-26 PROBLEM — F32.1 CURRENT MODERATE EPISODE OF MAJOR DEPRESSIVE DISORDER WITHOUT PRIOR EPISODE (HCC): Status: ACTIVE | Noted: 2023-02-26

## 2023-03-31 ENCOUNTER — PATIENT MESSAGE (OUTPATIENT)
Dept: PRIMARY CARE CLINIC | Age: 28
End: 2023-03-31

## 2023-03-31 NOTE — TELEPHONE ENCOUNTER
From: Sindy Alas  To: Jo Ann Mg  Sent: 3/31/2023 6:33 AM EDT  Subject: Hand    I know before I got pregnant we were elizabeth looking into my hand and why its hurting but I really think we need to look at it again. Its gotten to the point where it hurt to write anything or to even drive or  my baby.

## 2023-03-31 NOTE — TELEPHONE ENCOUNTER
Left a voice msg for pt to call back the office to schedule and appointment for the pain on her hand.

## 2023-05-13 DIAGNOSIS — K21.9 GASTROESOPHAGEAL REFLUX DISEASE WITHOUT ESOPHAGITIS: ICD-10-CM

## 2023-05-15 RX ORDER — PANTOPRAZOLE SODIUM 40 MG/1
TABLET, DELAYED RELEASE ORAL
Qty: 90 TABLET | Refills: 0 | Status: SHIPPED | OUTPATIENT
Start: 2023-05-15

## 2023-05-15 NOTE — TELEPHONE ENCOUNTER
Recent Visits  Date Type Provider Dept   02/03/23 Office Visit MARTA Arce CNP [facility]   11/04/22 Office Visit MARTA Arce CNP [facility]   09/23/22 Office Visit MARTA Arce CNP [facility]   08/01/22 Office Visit MARTA Arce CNP [facility]   03/10/22 Office Visit MARTA Arce CNP [facility]   12/17/21 Office Visit MARTA Arce CNP [facility]   Showing recent visits within past 540 days with a meds authorizing provider and meeting all other requirements  Future Appointments  No visits were found meeting these conditions.   Showing future appointments within next 150 days with a meds authorizing provider and meeting all other requirements

## 2023-08-11 ENCOUNTER — TELEMEDICINE (OUTPATIENT)
Dept: PRIMARY CARE CLINIC | Age: 28
End: 2023-08-11

## 2023-08-11 DIAGNOSIS — G43.109 MIGRAINE WITH AURA AND WITHOUT STATUS MIGRAINOSUS, NOT INTRACTABLE: ICD-10-CM

## 2023-08-11 DIAGNOSIS — K21.9 GASTROESOPHAGEAL REFLUX DISEASE WITHOUT ESOPHAGITIS: ICD-10-CM

## 2023-08-11 DIAGNOSIS — F32.1 CURRENT MODERATE EPISODE OF MAJOR DEPRESSIVE DISORDER WITHOUT PRIOR EPISODE (HCC): ICD-10-CM

## 2023-08-11 DIAGNOSIS — F41.9 ANXIETY: ICD-10-CM

## 2023-08-11 PROBLEM — L02.91 ABSCESS: Status: RESOLVED | Noted: 2022-03-10 | Resolved: 2023-08-11

## 2023-08-11 RX ORDER — PANTOPRAZOLE SODIUM 40 MG/1
TABLET, DELAYED RELEASE ORAL
Qty: 180 TABLET | Refills: 0 | Status: SHIPPED | OUTPATIENT
Start: 2023-08-11

## 2023-08-11 RX ORDER — METOPROLOL TARTRATE 50 MG/1
TABLET, FILM COATED ORAL
Qty: 46 TABLET | Refills: 0 | Status: SHIPPED | OUTPATIENT
Start: 2023-08-11

## 2023-08-11 RX ORDER — BUSPIRONE HYDROCHLORIDE 5 MG/1
TABLET ORAL
Qty: 90 TABLET | Refills: 0 | Status: SHIPPED | OUTPATIENT
Start: 2023-08-11

## 2023-08-11 RX ORDER — SUMATRIPTAN 50 MG/1
TABLET, FILM COATED ORAL
Qty: 12 TABLET | Refills: 2 | Status: SHIPPED | OUTPATIENT
Start: 2023-08-11

## 2023-08-11 RX ORDER — SERTRALINE HYDROCHLORIDE 100 MG/1
100 TABLET, FILM COATED ORAL DAILY
Qty: 30 TABLET | Refills: 0 | Status: SHIPPED | OUTPATIENT
Start: 2023-08-11 | End: 2023-09-10

## 2023-08-11 RX ORDER — ONDANSETRON 4 MG/1
8 TABLET, FILM COATED ORAL DAILY PRN
Qty: 30 TABLET | Refills: 0 | Status: SHIPPED | OUTPATIENT
Start: 2023-08-11

## 2023-08-11 ASSESSMENT — ENCOUNTER SYMPTOMS
SHORTNESS OF BREATH: 0
CHEST TIGHTNESS: 0
COUGH: 0
VOMITING: 0
NAUSEA: 1
WHEEZING: 0

## 2023-08-11 NOTE — PATIENT INSTRUCTIONS
Anxiety and Depression  - Practice self care and daily fresh air for 20 minutes. - Regular exercise program (work up to 150 minutes per week)  - Practice self relaxation with music or meditation etc. Phone apps such as Share[employer]: Mediatation and Relaxation, or Calm. - The crisis number for [facility] is [phone]. You can use this number at any time to access emergency mental health services. - Text HOME to [phone] from anywhere in the [facility], anytime, about any type of crisis. Crisis Text Line serves anyone, in any type of crisis, providing access to free, 24/7. The first two responses are automated. They tell you that you're being connected with a Crisis Counselor, and invite you to share a bit more. The Crisis Counselor is a trained volunteer, not a professional.  It usually takes less than five minutes to connect you with a Crisis Counselor. The goal of any conversation is to get you to a calm, safe place.

## 2023-08-11 NOTE — PROGRESS NOTES
emergency medical treatment and/or call 911 if deemed necessary. Patient identification was verified at the start of the visit: Yes    Total time spent on this encounter:  15  minutes. Services were provided through a video synchronous discussion virtually to substitute for in-person clinic visit. Patient was located in their home. Provider was located in the office. --MARTA Barboza CNP on 8/11/2023 at 1:27 PM    An electronic signature was used to authenticate this note. This dictation was generated by voice recognition computer software. Although all attempts are made to edit the dictation for accuracy, there may be errors in the transcription that are not intended.

## 2023-08-14 DIAGNOSIS — K21.9 GASTROESOPHAGEAL REFLUX DISEASE WITHOUT ESOPHAGITIS: ICD-10-CM

## 2023-08-14 RX ORDER — PANTOPRAZOLE SODIUM 40 MG/1
TABLET, DELAYED RELEASE ORAL
Qty: 90 TABLET | OUTPATIENT
Start: 2023-08-14

## 2023-08-25 ENCOUNTER — PATIENT MESSAGE (OUTPATIENT)
Dept: PRIMARY CARE CLINIC | Age: 28
End: 2023-08-25

## 2023-08-31 DIAGNOSIS — G43.109 MIGRAINE WITH AURA AND WITHOUT STATUS MIGRAINOSUS, NOT INTRACTABLE: ICD-10-CM

## 2023-08-31 NOTE — TELEPHONE ENCOUNTER
Please ask patient if she is taking 1 or 2 of the 50 mg tablets twice daily. If she is taking 2 BID than I will change the tablet to 100 mg for her to take 1 tab BID.    Thank you

## 2023-08-31 NOTE — TELEPHONE ENCOUNTER
Recent Visits  Date Type Provider Dept   02/03/23 Office Visit Aundra Picking, APRN - CNP [facility]   11/04/22 Office Visit Aundra Picking, APRN - CNP [facility]   09/23/22 Office Visit Aundra Picking, APRN - CNP [facility]   08/01/22 Office Visit Aundra Picking, APRN - CNP [facility]   03/10/22 Office Visit Aundra Picking, APRN - CNP [facility]   Showing recent visits within past 540 days with a meds authorizing provider and meeting all other requirements  Future Appointments  No visits were found meeting these conditions.   Showing future appointments within next 150 days with a meds authorizing provider and meeting all other requirements

## 2023-09-01 RX ORDER — METOPROLOL TARTRATE 50 MG/1
TABLET, FILM COATED ORAL
Qty: 46 TABLET | Refills: 0 | Status: SHIPPED | OUTPATIENT
Start: 2023-09-01

## 2023-09-02 DIAGNOSIS — F32.1 CURRENT MODERATE EPISODE OF MAJOR DEPRESSIVE DISORDER WITHOUT PRIOR EPISODE (HCC): ICD-10-CM

## 2023-09-02 DIAGNOSIS — F41.9 ANXIETY: ICD-10-CM

## 2023-09-05 RX ORDER — SERTRALINE HYDROCHLORIDE 100 MG/1
TABLET, FILM COATED ORAL
Qty: 90 TABLET | Refills: 0 | Status: SHIPPED | OUTPATIENT
Start: 2023-09-05 | End: 2023-12-04

## 2023-09-05 RX ORDER — BUSPIRONE HYDROCHLORIDE 5 MG/1
TABLET ORAL
Qty: 90 TABLET | Refills: 2 | Status: SHIPPED | OUTPATIENT
Start: 2023-09-05

## 2023-09-05 NOTE — TELEPHONE ENCOUNTER
Recent Visits  Date Type Provider Dept   02/03/23 Office Visit MARTA Carr CNP [facility]   11/04/22 Office Visit MARTA Carr CNP [facility]   09/23/22 Office Visit MARTA Carr CNP [facility]   08/01/22 Office Visit Traci Lucero, APRN - CNP [facility]   Showing recent visits within past 540 days with a meds authorizing provider and meeting all other requirements  Future Appointments  No visits were found meeting these conditions.   Showing future appointments within next 150 days with a meds authorizing provider and meeting all other requirements

## 2023-10-03 DIAGNOSIS — G43.109 MIGRAINE WITH AURA AND WITHOUT STATUS MIGRAINOSUS, NOT INTRACTABLE: ICD-10-CM

## 2023-10-04 PROBLEM — O47.9 THREATENED LABOR AT TERM: Status: RESOLVED | Noted: 2023-01-06 | Resolved: 2023-10-04

## 2023-10-04 RX ORDER — METOPROLOL TARTRATE 100 MG/1
TABLET ORAL
Qty: 60 TABLET | Refills: 2 | Status: SHIPPED | OUTPATIENT
Start: 2023-10-04

## 2023-10-04 NOTE — TELEPHONE ENCOUNTER
1. Migraine with aura and without status migrainosus, not intractable  - Start metoprolol tartrate (LOPRESSOR) 100 MG tablet; Take 1 tablet (100 mg)  by mouth twice daily (morning and bedtime)  Dispense: 60 tablet; Refill: 2     Note: Tablet dosage was increased from 50 mg to 100 mg tablets to avoid patient having to take 2 tablets BID.

## 2023-10-04 NOTE — TELEPHONE ENCOUNTER
Recent Visits  Date Type Provider Dept   02/03/23 Office Visit Nikkie Earnest, APRN - CNP [facility]   11/04/22 Office Visit Nikkie Earnest, APRN - CNP [facility]   09/23/22 Office Visit Nikkie Earnest, APRN - CNP [facility]   08/01/22 Office Visit Nikkie Earnest, APRN - CNP [facility]   Showing recent visits within past 540 days with a meds authorizing provider and meeting all other requirements  Future Appointments  No visits were found meeting these conditions.   Showing future appointments within next 150 days with a meds authorizing provider and meeting all other requirements     8/11/2023

## 2023-11-06 DIAGNOSIS — K21.9 GASTROESOPHAGEAL REFLUX DISEASE WITHOUT ESOPHAGITIS: ICD-10-CM

## 2023-11-06 RX ORDER — PANTOPRAZOLE SODIUM 40 MG/1
TABLET, DELAYED RELEASE ORAL
Qty: 180 TABLET | Refills: 0 | Status: SHIPPED | OUTPATIENT
Start: 2023-11-06

## 2023-11-06 NOTE — TELEPHONE ENCOUNTER
Recent Visits  Date Type Provider Dept   02/03/23 Office Visit MARTA Alexander CNP [facility]   11/04/22 Office Visit MARTA Alexander CNP [facility]   09/23/22 Office Visit MARTA Alexander - CNP [facility]   08/01/22 Office Visit MARTA Alexander CNP [facility]   Showing recent visits within past 540 days with a meds authorizing provider and meeting all other requirements  Future Appointments  Date Type Provider Dept   11/08/23 Appointment MARTA Alexander CNP [facility]   Showing future appointments within next 150 days with a meds authorizing provider and meeting all other requirements

## 2023-11-08 ENCOUNTER — OFFICE VISIT (OUTPATIENT)
Dept: PRIMARY CARE CLINIC | Age: 28
End: 2023-11-08
Payer: COMMERCIAL

## 2023-11-08 VITALS
SYSTOLIC BLOOD PRESSURE: 120 MMHG | DIASTOLIC BLOOD PRESSURE: 80 MMHG | WEIGHT: 195 LBS | OXYGEN SATURATION: 97 % | BODY MASS INDEX: 34.55 KG/M2 | HEIGHT: 63 IN | HEART RATE: 85 BPM

## 2023-11-08 DIAGNOSIS — Z23 FLU VACCINE NEED: ICD-10-CM

## 2023-11-08 DIAGNOSIS — R11.0 NAUSEA: ICD-10-CM

## 2023-11-08 DIAGNOSIS — R10.11 ABDOMINAL PAIN, RIGHT UPPER QUADRANT: ICD-10-CM

## 2023-11-08 DIAGNOSIS — M25.531 RIGHT WRIST PAIN: Primary | ICD-10-CM

## 2023-11-08 LAB
ALBUMIN SERPL-MCNC: 4.7 G/DL (ref 3.4–5)
ALBUMIN/GLOB SERPL: 1.6 {RATIO} (ref 1.1–2.2)
ALP SERPL-CCNC: 126 U/L (ref 40–129)
ALT SERPL-CCNC: 19 U/L (ref 10–40)
ANION GAP SERPL CALCULATED.3IONS-SCNC: 12 MMOL/L (ref 3–16)
AST SERPL-CCNC: 23 U/L (ref 15–37)
BILIRUB SERPL-MCNC: 0.3 MG/DL (ref 0–1)
BUN SERPL-MCNC: 13 MG/DL (ref 7–20)
CALCIUM SERPL-MCNC: 9.5 MG/DL (ref 8.3–10.6)
CHLORIDE SERPL-SCNC: 103 MMOL/L (ref 99–110)
CO2 SERPL-SCNC: 22 MMOL/L (ref 21–32)
CREAT SERPL-MCNC: 0.6 MG/DL (ref 0.6–1.1)
GFR SERPLBLD CREATININE-BSD FMLA CKD-EPI: >60 ML/MIN/{1.73_M2}
GLUCOSE P FAST SERPL-MCNC: 84 MG/DL (ref 70–99)
LIPASE SERPL-CCNC: 40 U/L (ref 13–60)
POTASSIUM SERPL-SCNC: 4.4 MMOL/L (ref 3.5–5.1)
PROT SERPL-MCNC: 7.7 G/DL (ref 6.4–8.2)
SODIUM SERPL-SCNC: 137 MMOL/L (ref 136–145)

## 2023-11-08 PROCEDURE — 99214 OFFICE O/P EST MOD 30 MIN: CPT | Performed by: NURSE PRACTITIONER

## 2023-11-08 PROCEDURE — 90471 IMMUNIZATION ADMIN: CPT | Performed by: NURSE PRACTITIONER

## 2023-11-08 PROCEDURE — 90674 CCIIV4 VAC NO PRSV 0.5 ML IM: CPT | Performed by: NURSE PRACTITIONER

## 2023-11-08 RX ORDER — ONDANSETRON 4 MG/1
8 TABLET, FILM COATED ORAL DAILY PRN
Qty: 30 TABLET | Refills: 0 | Status: SHIPPED | OUTPATIENT
Start: 2023-11-08

## 2023-11-08 ASSESSMENT — PATIENT HEALTH QUESTIONNAIRE - PHQ9
5. POOR APPETITE OR OVEREATING: 2
3. TROUBLE FALLING OR STAYING ASLEEP: 1
SUM OF ALL RESPONSES TO PHQ9 QUESTIONS 1 & 2: 0
10. IF YOU CHECKED OFF ANY PROBLEMS, HOW DIFFICULT HAVE THESE PROBLEMS MADE IT FOR YOU TO DO YOUR WORK, TAKE CARE OF THINGS AT HOME, OR GET ALONG WITH OTHER PEOPLE: 1
2. FEELING DOWN, DEPRESSED OR HOPELESS: 0
1. LITTLE INTEREST OR PLEASURE IN DOING THINGS: 0
SUM OF ALL RESPONSES TO PHQ QUESTIONS 1-9: 5
6. FEELING BAD ABOUT YOURSELF - OR THAT YOU ARE A FAILURE OR HAVE LET YOURSELF OR YOUR FAMILY DOWN: 0
SUM OF ALL RESPONSES TO PHQ QUESTIONS 1-9: 5
8. MOVING OR SPEAKING SO SLOWLY THAT OTHER PEOPLE COULD HAVE NOTICED. OR THE OPPOSITE, BEING SO FIGETY OR RESTLESS THAT YOU HAVE BEEN MOVING AROUND A LOT MORE THAN USUAL: 0
7. TROUBLE CONCENTRATING ON THINGS, SUCH AS READING THE NEWSPAPER OR WATCHING TELEVISION: 0
SUM OF ALL RESPONSES TO PHQ QUESTIONS 1-9: 5
SUM OF ALL RESPONSES TO PHQ QUESTIONS 1-9: 5
4. FEELING TIRED OR HAVING LITTLE ENERGY: 2
9. THOUGHTS THAT YOU WOULD BE BETTER OFF DEAD, OR OF HURTING YOURSELF: 0

## 2023-11-08 ASSESSMENT — ENCOUNTER SYMPTOMS
CHEST TIGHTNESS: 0
ABDOMINAL DISTENTION: 0
ABDOMINAL PAIN: 0
CONSTIPATION: 0
RESPIRATORY NEGATIVE: 1
NAUSEA: 1
SORE THROAT: 0
SWOLLEN GLANDS: 0
DIARRHEA: 0
VOMITING: 0
SHORTNESS OF BREATH: 0

## 2023-11-08 ASSESSMENT — ANXIETY QUESTIONNAIRES
GAD7 TOTAL SCORE: 8
5. BEING SO RESTLESS THAT IT IS HARD TO SIT STILL: 0
1. FEELING NERVOUS, ANXIOUS, OR ON EDGE: 1
2. NOT BEING ABLE TO STOP OR CONTROL WORRYING: 2
3. WORRYING TOO MUCH ABOUT DIFFERENT THINGS: 1
6. BECOMING EASILY ANNOYED OR IRRITABLE: 2
4. TROUBLE RELAXING: 1
7. FEELING AFRAID AS IF SOMETHING AWFUL MIGHT HAPPEN: 1
IF YOU CHECKED OFF ANY PROBLEMS ON THIS QUESTIONNAIRE, HOW DIFFICULT HAVE THESE PROBLEMS MADE IT FOR YOU TO DO YOUR WORK, TAKE CARE OF THINGS AT HOME, OR GET ALONG WITH OTHER PEOPLE: SOMEWHAT DIFFICULT

## 2023-11-08 NOTE — PROGRESS NOTES
11/8/2023    Chief Complaint   Patient presents with    Nausea    Hand Pain     Right hand pain, hurts to bend     Flu Vaccine       Markie Walker is a 33 y.o. female, presents today for right wrist pain and nausea. HPI  Wrist Pain   The pain is present in the right wrist. This is a recurrent problem. The problem occurs constantly (currently constant, that will resolve for a couple weeks than resumes). The quality of the pain is described as pounding and dull (occassionally tingling. Sharp pain when patient attempts to use it). The pain is severe. Associated symptoms include a limited range of motion, stiffness (right thumb) and tingling (occassionally). Pertinent negatives include no joint locking, joint swelling or numbness. Associated symptoms comments: Pain makes it difficult to write and  9 month old son. The symptoms are aggravated by activity. Treatments tried: wrist splint when pain initally started in Feb 2023. Family history does not include rheumatoid arthritis. There is no history of osteoarthritis or rheumatoid arthritis. Nausea & Vomiting  This is a new (Nausea only) problem. Episode onset: 2 weeks ago. Episode frequency: every morning and occassionally random times during the day. Associated symptoms include anorexia and nausea. Pertinent negatives include no abdominal pain, chest pain, diaphoresis, fatigue, headaches, numbness, sore throat, swollen glands, vomiting or weakness. Associated symptoms comments: Right upper quadrant pain after eating greasy foods. Exacerbated by: greasy foods. Patient denies possibility of pregnancy. Review of Systems   Constitutional:  Negative for activity change, appetite change, diaphoresis and fatigue. HENT:  Negative for sore throat. Respiratory: Negative. Negative for chest tightness and shortness of breath. Cardiovascular: Negative. Negative for chest pain, palpitations and leg swelling.    Gastrointestinal:  Positive for

## 2023-11-09 ENCOUNTER — PROCEDURE VISIT (OUTPATIENT)
Dept: NEUROLOGY | Age: 28
End: 2023-11-09
Payer: COMMERCIAL

## 2023-11-09 DIAGNOSIS — M25.531 RIGHT WRIST PAIN: Primary | ICD-10-CM

## 2023-11-09 PROCEDURE — 95886 MUSC TEST DONE W/N TEST COMP: CPT | Performed by: PSYCHIATRY & NEUROLOGY

## 2023-11-09 PROCEDURE — 95909 NRV CNDJ TST 5-6 STUDIES: CPT | Performed by: PSYCHIATRY & NEUROLOGY

## 2023-11-09 NOTE — PROGRESS NOTES
Rocky Alfonso M.D. [facility]) Physicians/[facility]  Board Certified in [address]    EMG / NERVE CONDUCTION STUDY      PATIENT:  Rudolph Joshua       DATE OF EM23     YOB: 1995       REASON FOR EMG:   Right wrist pain, rule out carpal tunnel syndrome      REFERRING PHYSICIAN:  Glenny Bush, APRN - CNP  [id number] [address]     SUMMARY:   The right median motor and sensory nerve studies were normal.  The right ulnar motor and sensory nerve studies were normal.  The right radial sensory nerve study was normal.  Needle EMG of several muscles in the right upper extremity was normal.      CLINICAL DIAGNOSIS:  Carpal tunnel syndrome        EMG RESULTS:     This is a normal EMG nerve conduction study of the right upper extremity. There is no electrophysiological evidence for carpal tunnel syndrome in this study. ---------------------------------------------  Rocky Alfonso M.D.   Electromyographer / Neurologist

## 2023-11-15 ENCOUNTER — TELEPHONE (OUTPATIENT)
Dept: PRIMARY CARE CLINIC | Age: 28
End: 2023-11-15

## 2023-11-15 ENCOUNTER — HOSPITAL ENCOUNTER (OUTPATIENT)
Dept: ULTRASOUND IMAGING | Age: 28
Discharge: HOME | End: 2023-11-15
Payer: COMMERCIAL

## 2023-11-15 DIAGNOSIS — K76.0 FATTY LIVER: Primary | ICD-10-CM

## 2023-11-15 DIAGNOSIS — R10.11 ABDOMINAL PAIN, RIGHT UPPER QUADRANT: ICD-10-CM

## 2023-11-15 DIAGNOSIS — R11.0 NAUSEA: ICD-10-CM

## 2023-11-15 PROCEDURE — 76700 US EXAM ABDOM COMPLETE: CPT

## 2023-11-15 NOTE — TELEPHONE ENCOUNTER
Called and informed pt of EMG results ( The right median motor and sensory nerve studies were normal. Patient verbalized understanding, states she doesn't want a referral  to [facility] because they are going to tell her the same thing, informed pt that Kat Greco will give a call

## 2023-11-29 ENCOUNTER — OFFICE VISIT (OUTPATIENT)
Dept: PRIMARY CARE CLINIC | Age: 28
End: 2023-11-29
Payer: COMMERCIAL

## 2023-11-29 VITALS
HEART RATE: 79 BPM | HEIGHT: 63 IN | BODY MASS INDEX: 34.73 KG/M2 | WEIGHT: 196 LBS | SYSTOLIC BLOOD PRESSURE: 118 MMHG | OXYGEN SATURATION: 99 % | DIASTOLIC BLOOD PRESSURE: 80 MMHG

## 2023-11-29 DIAGNOSIS — K76.0 FATTY LIVER: ICD-10-CM

## 2023-11-29 DIAGNOSIS — R11.0 NAUSEA: ICD-10-CM

## 2023-11-29 DIAGNOSIS — G89.29 CHRONIC PAIN OF RIGHT WRIST: Primary | ICD-10-CM

## 2023-11-29 DIAGNOSIS — K21.9 GASTROESOPHAGEAL REFLUX DISEASE WITHOUT ESOPHAGITIS: ICD-10-CM

## 2023-11-29 DIAGNOSIS — E66.09 CLASS 1 OBESITY DUE TO EXCESS CALORIES WITHOUT SERIOUS COMORBIDITY WITH BODY MASS INDEX (BMI) OF 34.0 TO 34.9 IN ADULT: ICD-10-CM

## 2023-11-29 DIAGNOSIS — M25.531 CHRONIC PAIN OF RIGHT WRIST: Primary | ICD-10-CM

## 2023-11-29 PROBLEM — E66.811 CLASS 1 OBESITY DUE TO EXCESS CALORIES WITHOUT SERIOUS COMORBIDITY WITH BODY MASS INDEX (BMI) OF 34.0 TO 34.9 IN ADULT: Status: ACTIVE | Noted: 2023-11-29

## 2023-11-29 PROCEDURE — 99214 OFFICE O/P EST MOD 30 MIN: CPT | Performed by: NURSE PRACTITIONER

## 2023-11-29 ASSESSMENT — PATIENT HEALTH QUESTIONNAIRE - PHQ9
4. FEELING TIRED OR HAVING LITTLE ENERGY: 2
3. TROUBLE FALLING OR STAYING ASLEEP: 0
5. POOR APPETITE OR OVEREATING: 1
1. LITTLE INTEREST OR PLEASURE IN DOING THINGS: 1
SUM OF ALL RESPONSES TO PHQ QUESTIONS 1-9: 5
2. FEELING DOWN, DEPRESSED OR HOPELESS: 1
SUM OF ALL RESPONSES TO PHQ QUESTIONS 1-9: 5
SUM OF ALL RESPONSES TO PHQ9 QUESTIONS 1 & 2: 2
8. MOVING OR SPEAKING SO SLOWLY THAT OTHER PEOPLE COULD HAVE NOTICED. OR THE OPPOSITE, BEING SO FIGETY OR RESTLESS THAT YOU HAVE BEEN MOVING AROUND A LOT MORE THAN USUAL: 0
7. TROUBLE CONCENTRATING ON THINGS, SUCH AS READING THE NEWSPAPER OR WATCHING TELEVISION: 0
9. THOUGHTS THAT YOU WOULD BE BETTER OFF DEAD, OR OF HURTING YOURSELF: 0
10. IF YOU CHECKED OFF ANY PROBLEMS, HOW DIFFICULT HAVE THESE PROBLEMS MADE IT FOR YOU TO DO YOUR WORK, TAKE CARE OF THINGS AT HOME, OR GET ALONG WITH OTHER PEOPLE: 1

## 2023-11-29 ASSESSMENT — ENCOUNTER SYMPTOMS
NAUSEA: 1
DIARRHEA: 0
ABDOMINAL PAIN: 1
RESPIRATORY NEGATIVE: 1
VOMITING: 0
SHORTNESS OF BREATH: 0
ABDOMINAL DISTENTION: 0
CHEST TIGHTNESS: 0
CONSTIPATION: 0

## 2023-11-29 ASSESSMENT — ANXIETY QUESTIONNAIRES
7. FEELING AFRAID AS IF SOMETHING AWFUL MIGHT HAPPEN: 1
5. BEING SO RESTLESS THAT IT IS HARD TO SIT STILL: 0
GAD7 TOTAL SCORE: 7
3. WORRYING TOO MUCH ABOUT DIFFERENT THINGS: 1
1. FEELING NERVOUS, ANXIOUS, OR ON EDGE: 2
6. BECOMING EASILY ANNOYED OR IRRITABLE: 1
IF YOU CHECKED OFF ANY PROBLEMS ON THIS QUESTIONNAIRE, HOW DIFFICULT HAVE THESE PROBLEMS MADE IT FOR YOU TO DO YOUR WORK, TAKE CARE OF THINGS AT HOME, OR GET ALONG WITH OTHER PEOPLE: SOMEWHAT DIFFICULT
2. NOT BEING ABLE TO STOP OR CONTROL WORRYING: 1
4. TROUBLE RELAXING: 1

## 2023-11-29 NOTE — PROGRESS NOTES
11/29/2023    Chief Complaint   Patient presents with    Follow-up     Right wrist pain    Discuss Labs    Results     Abdominal ultrasound       Pool De Santiago is a 33 y.o. female, presents today for follow up for right wrist pain and abdominal pain/nausea and review lab results and abdominal ultrasound results. HPI  Wrist Pain   The pain is present in the right wrist. This is a recurrent problem. The problem occurs constantly (currently constant, that will resolve for a couple weeks than resumes). The quality of the pain is described as pounding and dull (occassionally tingling. Sharp pain when patient attempts to use it). The pain is severe. Associated symptoms include a limited range of motion, stiffness (right thumb) and tingling (occassionally). Pertinent negatives include no joint locking or joint swelling. Associated symptoms comments: Pain makes it difficult to write and  9 month old son. The symptoms are aggravated by activity. Treatments tried: wears wrist splint when not working or holding childn Feb 2023. Family history does not include rheumatoid arthritis. There is no history of osteoarthritis or rheumatoid arthritis. Nausea and abdominal pain  Episode onset: 4 weeks ago. Episode frequency: every morning and occassionally random times during the day. Associated symptoms include anorexia and nausea, epigastric pain. Pertinent negatives include no abdominal pain, chest pain, diaphoresis, fatigue, headaches, numbness, sore throat, swollen glands, vomiting or weakness. Associated symptoms comments: Right upper quadrant pain after eating greasy foods. Exacerbated by: greasy foods. Hx of GERD- takes Protonix 40 mg every morning before breakfast, occasionally at night time. No change in symptoms  when Protonix is taken twice daily. Patient denies possibility of pregnancy. Fatty Liver  Patient's alcohol. Occasionally takes NSAIDs for headache.   Diagnosis

## 2024-01-10 ENCOUNTER — OFFICE VISIT (OUTPATIENT)
Dept: ORTHOPEDIC SURGERY | Age: 29
End: 2024-01-10
Payer: COMMERCIAL

## 2024-01-10 VITALS — RESPIRATION RATE: 16 BRPM | HEIGHT: 63 IN | WEIGHT: 196 LBS | BODY MASS INDEX: 34.73 KG/M2

## 2024-01-10 DIAGNOSIS — M25.531 RIGHT WRIST PAIN: Primary | ICD-10-CM

## 2024-01-10 PROCEDURE — 99244 OFF/OP CNSLTJ NEW/EST MOD 40: CPT | Performed by: ORTHOPAEDIC SURGERY

## 2024-01-10 NOTE — PROGRESS NOTES
Ms. Mildred Beckford is a 28 y.o. right handed woman  who is seen today in Hand Surgical Consultation at the request of Afua Bush APRN - CNP.    She presents today regarding right wrist symptoms which have been present for approximately 3 years.  A history of antecedent trauma or injury is Absent.  She reports symptoms to include moderate pain along the  right  Radial and Volar wrist and distal forearm.  Wrist symptoms are worse with certain twisting or gripping activities.  Symptoms are  worse with use.  She reports no pain with thumb extension or pinch. Symptoms show no change over time.      Previous treatment has included conservative measures, splinting of the right wrist, and electrodiagnostic studies were ordered.  She does not claim relation of her symptoms to her required work activities.  She has undergone any form of testing.    I have today reviewed with Mildred Beckford the clinically relevant, past medical history, medications, allergies,  family history, social history, and Review Of Systems & I have documented any details relevant to today's presenting complaints in my history above.  Ms. Mildred Beckford's self-reported past medical history, medications, allergies,  family history, social history, and Review Of Systems have been scanned into the chart under the \"Media\" tab.      Physical Exam:  Ms. Mildred Beckford's most recent vitals:  Vitals  Respirations: 16  Height: 160 cm (5' 3\")  Weight - Scale: 88.9 kg (196 lb)    She is well nourished, oriented to person, place & time.  She demonstrates appropriate mood and affect as well as normal gait and station.    Skin: Normal in appearance, Normal Color, and Free of Lesions Bilaterally   Digital range of motion is without significant limitation bilaterally.  Pain does not accompany the flexion and extension of the right Thumb and wrist .  There is not triggering associated with active thumb motion.  There is not a palpable mass overlying the

## 2024-01-11 NOTE — PATIENT INSTRUCTIONS
Thank you for choosing [facility] for your Hand and Upper Extremity needs.  If we can be of any further assistance to you, please do not hesitate to contact us.    Office Phone Number:  [phone]  or  [phone]

## 2024-03-14 DIAGNOSIS — F41.9 ANXIETY: ICD-10-CM

## 2024-03-14 DIAGNOSIS — F32.1 CURRENT MODERATE EPISODE OF MAJOR DEPRESSIVE DISORDER WITHOUT PRIOR EPISODE (HCC): ICD-10-CM

## 2024-03-14 RX ORDER — SERTRALINE HYDROCHLORIDE 100 MG/1
TABLET, FILM COATED ORAL
Qty: 90 TABLET | Refills: 0 | Status: SHIPPED | OUTPATIENT
Start: 2024-03-14 | End: 2024-06-12

## 2024-03-14 NOTE — TELEPHONE ENCOUNTER
Medication:   Requested Prescriptions     Pending Prescriptions Disp Refills    sertraline (ZOLOFT) 100 MG tablet [Pharmacy Med Name: SERTRALINE  MG TABLET] 90 tablet 0     Sig: TAKE 1 TABLET BY MOUTH EVERY DAY      Last Filled:  12/18/24    Patient Phone Number: [phone] (home) [phone] (work)    Last appt: 11/29/2023   Next appt: Visit date not found    Last OARRS:        No data to display              PDMP Monitoring:    Last PDMP [place] as Reviewed ([place]):  Review User Review Instant Review Result          Preferred Pharmacy:   [facility] [id number] [place] [facility] [place] - [address] [facility] [phone] - F [phone]  [address] [facility] [address]  Phone: [phone] Fax: [phone]    Recent Visits  Date Type Provider Dept   11/29/23 Office Visit Afua Bush APRN - CNP [facility]   11/08/23 Office Visit Afua Bush APRN - CNP [facility]   02/03/23 Office Visit Afua Bush APRN - CNP [facility]   11/04/22 Office Visit Afua Bush APRN - CNP [facility]   09/23/22 Office Visit Afua Bush APRN - CNP [facility]   Showing recent visits within past 540 days with a meds authorizing provider and meeting all other requirements  Future Appointments  No visits were found meeting these conditions.  Showing future appointments within next 150 days with a meds authorizing provider and meeting all other requirements     11/29/2023

## 2024-03-27 DIAGNOSIS — K21.9 GASTROESOPHAGEAL REFLUX DISEASE WITHOUT ESOPHAGITIS: ICD-10-CM

## 2024-03-27 DIAGNOSIS — R11.0 NAUSEA: ICD-10-CM

## 2024-03-27 RX ORDER — PANTOPRAZOLE SODIUM 40 MG/1
TABLET, DELAYED RELEASE ORAL
Qty: 180 TABLET | Refills: 0 | Status: CANCELLED | OUTPATIENT
Start: 2024-03-27

## 2024-03-27 RX ORDER — ONDANSETRON 4 MG/1
8 TABLET, FILM COATED ORAL DAILY PRN
Qty: 30 TABLET | Refills: 0 | Status: CANCELLED | OUTPATIENT
Start: 2024-03-27

## 2024-03-27 NOTE — TELEPHONE ENCOUNTER
Recent Visits  Date Type Provider Dept   11/29/23 Office Visit Afua Bush APRN - CNP [facility]   11/08/23 Office Visit Afua Bush APRN - CNP [facility]   02/03/23 Office Visit Afua Bush APRN - CNP [facility]   11/04/22 Office Visit Afua Bush APRN - CNP [facility]   Showing recent visits within past 540 days with a meds authorizing provider and meeting all other requirements  Future Appointments  No visits were found meeting these conditions.  Showing future appointments within next 150 days with a meds authorizing provider and meeting all other requirements

## 2024-03-28 ENCOUNTER — PATIENT MESSAGE (OUTPATIENT)
Dept: PRIMARY CARE CLINIC | Age: 29
End: 2024-03-28

## 2024-03-28 DIAGNOSIS — K21.9 GASTROESOPHAGEAL REFLUX DISEASE WITHOUT ESOPHAGITIS: ICD-10-CM

## 2024-03-28 RX ORDER — PANTOPRAZOLE SODIUM 40 MG/1
TABLET, DELAYED RELEASE ORAL
Qty: 180 TABLET | Refills: 0 | Status: SHIPPED | OUTPATIENT
Start: 2024-03-28

## 2024-03-28 NOTE — TELEPHONE ENCOUNTER
Medication:   Requested Prescriptions     Pending Prescriptions Disp Refills    pantoprazole (PROTONIX) 40 MG tablet 180 tablet 0      Last Filled:  12/18/24- pended due to needing signature on rx    Patient Phone Number: [phone] (home) [phone] (work)    Last appt: 11/29/2023   Next appt: Visit date not found    Last OARRS:        No data to display              PDMP Monitoring:    Last PDMP Omar as Reviewed ([place]):  Review User Review Instant Review Result          Preferred Pharmacy:   [facility] [address] [facility] - [place], [place] - [address] [facility] [phone] - F [phone]  [address] [place] [address]  Phone: [phone] Fax: [phone]    Recent Visits  Date Type Provider Dept   11/29/23 Office Visit Afua Bush APRN - CNP [facility]   11/08/23 Office Visit Afua Bush APRN - [facility]   02/03/23 Office Visit Afua Bush APRN - [facility]   11/04/22 Office Visit Afua Bush APRN - [facility]   Showing recent visits within past 540 days with a meds authorizing provider and meeting all other requirements  Future Appointments  No visits were found meeting these conditions.  Showing future appointments within next 150 days with a meds authorizing provider and meeting all other requirements     11/29/2023

## 2024-03-28 NOTE — TELEPHONE ENCOUNTER
From: Mildred Beckford  To: Afua Bush  Sent: 3/28/2024 1:28 PM EDT  Subject: Protonix    I’m completely out of prontonix, is there anyway I can get a refill?

## 2024-04-08 DIAGNOSIS — R11.0 NAUSEA: ICD-10-CM

## 2024-04-09 RX ORDER — ONDANSETRON 4 MG/1
8 TABLET, FILM COATED ORAL DAILY PRN
Qty: 30 TABLET | Refills: 0 | Status: SHIPPED | OUTPATIENT
Start: 2024-04-09

## 2024-05-20 ENCOUNTER — OFFICE VISIT (OUTPATIENT)
Dept: PRIMARY CARE CLINIC | Age: 29
End: 2024-05-20
Payer: COMMERCIAL

## 2024-05-20 DIAGNOSIS — M25.521 RIGHT ELBOW PAIN: Primary | ICD-10-CM

## 2024-05-20 DIAGNOSIS — K21.9 GASTROESOPHAGEAL REFLUX DISEASE WITHOUT ESOPHAGITIS: ICD-10-CM

## 2024-05-20 DIAGNOSIS — G43.109 MIGRAINE WITH AURA AND WITHOUT STATUS MIGRAINOSUS, NOT INTRACTABLE: ICD-10-CM

## 2024-05-20 DIAGNOSIS — F41.9 ANXIETY: ICD-10-CM

## 2024-05-20 DIAGNOSIS — M25.521 RIGHT ELBOW PAIN: ICD-10-CM

## 2024-05-20 DIAGNOSIS — F32.1 CURRENT MODERATE EPISODE OF MAJOR DEPRESSIVE DISORDER WITHOUT PRIOR EPISODE (HCC): ICD-10-CM

## 2024-05-20 LAB
ALBUMIN SERPL-MCNC: 4.6 G/DL (ref 3.4–5)
ALBUMIN/GLOB SERPL: 1.5 {RATIO} (ref 1.1–2.2)
ALP SERPL-CCNC: 108 U/L (ref 40–129)
ALT SERPL-CCNC: 16 U/L (ref 10–40)
ANION GAP SERPL CALCULATED.3IONS-SCNC: 13 MMOL/L (ref 3–16)
AST SERPL-CCNC: 21 U/L (ref 15–37)
BILIRUB SERPL-MCNC: 0.3 MG/DL (ref 0–1)
BUN SERPL-MCNC: 12 MG/DL (ref 7–20)
CALCIUM SERPL-MCNC: 9.2 MG/DL (ref 8.3–10.6)
CHLORIDE SERPL-SCNC: 104 MMOL/L (ref 99–110)
CO2 SERPL-SCNC: 22 MMOL/L (ref 21–32)
CREAT SERPL-MCNC: 0.6 MG/DL (ref 0.6–1.1)
GFR SERPLBLD CREATININE-BSD FMLA CKD-EPI: >90 ML/MIN/{1.73_M2}
GLUCOSE P FAST SERPL-MCNC: 96 MG/DL (ref 70–99)
POTASSIUM SERPL-SCNC: 4.2 MMOL/L (ref 3.5–5.1)
PROT SERPL-MCNC: 7.7 G/DL (ref 6.4–8.2)
SODIUM SERPL-SCNC: 139 MMOL/L (ref 136–145)
URATE SERPL-MCNC: 5.3 MG/DL (ref 2.6–6)

## 2024-05-20 PROCEDURE — G2211 COMPLEX E/M VISIT ADD ON: HCPCS | Performed by: NURSE PRACTITIONER

## 2024-05-20 PROCEDURE — 99214 OFFICE O/P EST MOD 30 MIN: CPT | Performed by: NURSE PRACTITIONER

## 2024-05-20 RX ORDER — PANTOPRAZOLE SODIUM 40 MG/1
TABLET, DELAYED RELEASE ORAL
Qty: 180 TABLET | Refills: 1 | Status: SHIPPED | OUTPATIENT
Start: 2024-05-20

## 2024-05-20 RX ORDER — METHOCARBAMOL 750 MG/1
1 TABLET ORAL WEEKLY
COMMUNITY
Start: 2024-03-27

## 2024-05-20 RX ORDER — METOPROLOL TARTRATE 100 MG/1
TABLET ORAL
Qty: 180 TABLET | Refills: 1 | Status: SHIPPED | OUTPATIENT
Start: 2024-05-20

## 2024-05-20 RX ORDER — IBUPROFEN 800 MG/1
800 TABLET ORAL EVERY 8 HOURS PRN
Qty: 90 TABLET | Refills: 0 | Status: SHIPPED | OUTPATIENT
Start: 2024-05-20 | End: 2024-06-19

## 2024-05-20 RX ORDER — SERTRALINE HYDROCHLORIDE 100 MG/1
100 TABLET, FILM COATED ORAL DAILY
Qty: 90 TABLET | Refills: 1 | Status: SHIPPED | OUTPATIENT
Start: 2024-05-20 | End: 2024-11-16

## 2024-05-20 SDOH — ECONOMIC STABILITY: FOOD INSECURITY: WITHIN THE PAST 12 MONTHS, THE FOOD YOU BOUGHT JUST DIDN'T LAST AND YOU DIDN'T HAVE MONEY TO GET MORE.: NEVER TRUE

## 2024-05-20 SDOH — ECONOMIC STABILITY: INCOME INSECURITY: HOW HARD IS IT FOR YOU TO PAY FOR THE VERY BASICS LIKE FOOD, HOUSING, MEDICAL CARE, AND HEATING?: NOT HARD AT ALL

## 2024-05-20 SDOH — ECONOMIC STABILITY: FOOD INSECURITY: WITHIN THE PAST 12 MONTHS, YOU WORRIED THAT YOUR FOOD WOULD RUN OUT BEFORE YOU GOT MONEY TO BUY MORE.: NEVER TRUE

## 2024-05-20 ASSESSMENT — ANXIETY QUESTIONNAIRES
GAD7 TOTAL SCORE: 8
5. BEING SO RESTLESS THAT IT IS HARD TO SIT STILL: NOT AT ALL
6. BECOMING EASILY ANNOYED OR IRRITABLE: MORE THAN HALF THE DAYS
IF YOU CHECKED OFF ANY PROBLEMS ON THIS QUESTIONNAIRE, HOW DIFFICULT HAVE THESE PROBLEMS MADE IT FOR YOU TO DO YOUR WORK, TAKE CARE OF THINGS AT HOME, OR GET ALONG WITH OTHER PEOPLE: SOMEWHAT DIFFICULT
3. WORRYING TOO MUCH ABOUT DIFFERENT THINGS: MORE THAN HALF THE DAYS
1. FEELING NERVOUS, ANXIOUS, OR ON EDGE: MORE THAN HALF THE DAYS
4. TROUBLE RELAXING: NOT AT ALL
7. FEELING AFRAID AS IF SOMETHING AWFUL MIGHT HAPPEN: NOT AT ALL
2. NOT BEING ABLE TO STOP OR CONTROL WORRYING: MORE THAN HALF THE DAYS

## 2024-05-20 ASSESSMENT — ENCOUNTER SYMPTOMS
VOMITING: 0
CHEST TIGHTNESS: 0
WHEEZING: 0
SHORTNESS OF BREATH: 0
COUGH: 0
NAUSEA: 1

## 2024-05-20 ASSESSMENT — PATIENT HEALTH QUESTIONNAIRE - PHQ9
4. FEELING TIRED OR HAVING LITTLE ENERGY: MORE THAN HALF THE DAYS
SUM OF ALL RESPONSES TO PHQ QUESTIONS 1-9: 3
7. TROUBLE CONCENTRATING ON THINGS, SUCH AS READING THE NEWSPAPER OR WATCHING TELEVISION: NOT AT ALL
9. THOUGHTS THAT YOU WOULD BE BETTER OFF DEAD, OR OF HURTING YOURSELF: NOT AT ALL
5. POOR APPETITE OR OVEREATING: NOT AT ALL
SUM OF ALL RESPONSES TO PHQ QUESTIONS 1-9: 3
SUM OF ALL RESPONSES TO PHQ QUESTIONS 1-9: 3
8. MOVING OR SPEAKING SO SLOWLY THAT OTHER PEOPLE COULD HAVE NOTICED. OR THE OPPOSITE, BEING SO FIGETY OR RESTLESS THAT YOU HAVE BEEN MOVING AROUND A LOT MORE THAN USUAL: NOT AT ALL
3. TROUBLE FALLING OR STAYING ASLEEP: NOT AT ALL
10. IF YOU CHECKED OFF ANY PROBLEMS, HOW DIFFICULT HAVE THESE PROBLEMS MADE IT FOR YOU TO DO YOUR WORK, TAKE CARE OF THINGS AT HOME, OR GET ALONG WITH OTHER PEOPLE: SOMEWHAT DIFFICULT
2. FEELING DOWN, DEPRESSED OR HOPELESS: SEVERAL DAYS
1. LITTLE INTEREST OR PLEASURE IN DOING THINGS: NOT AT ALL
SUM OF ALL RESPONSES TO PHQ9 QUESTIONS 1 & 2: 1
6. FEELING BAD ABOUT YOURSELF - OR THAT YOU ARE A FAILURE OR HAVE LET YOURSELF OR YOUR FAMILY DOWN: NOT AT ALL
SUM OF ALL RESPONSES TO PHQ QUESTIONS 1-9: 3

## 2024-05-20 NOTE — PROGRESS NOTES
Substance Use Topics    Alcohol use: Not Currently    Drug use: Never          PHYSICAL EXAMINATION:  Vital Signs: (As obtained by patient/caregiver or practitioner observation)  There were no vitals taken for this visit.      1/21/2022    10:45 AM   Patient-Reported Vitals   Patient-Reported Weight 196   Patient-Reported Height 5'3\"          Physical Exam                   ASSESSMENT/PLAN:  1. Anxiety  - Active (anxiety worse than depression)  - Start busPIRone (BUSPAR) 5 MG tablet; Take 1 tablet by mouth 3 times daily (every 8 hours) as needed for anxiety  Dispense: 90 tablet; Refill: 0  - Start sertraline (ZOLOFT) 100 MG tablet; Take 1 tablet by mouth daily  Dispense: 30 tablet; Refill: 0  - Zoloft increased from 75 mg to 100 mg daily.    2. Current moderate episode of major depressive disorder without prior episode (HCC)  - Active  - Start sertraline (ZOLOFT) 100 MG tablet; Take 1 tablet by mouth daily  Dispense: 30 tablet; Refill: 0  - Zoloft increased from 75 mg to 100 mg    3. Migraine with aura and without status migrainosus, not intractable  - Uncontrolled  - Restart metoprolol tartrate (LOPRESSOR) 50 MG tablet; Take 1 tablet by mouth in the morning and at bedtime time for 7 days, if well tolerated and no improvement of migraine headaches than start 2 tablets twice daily.  Dispense: 46 tablet; Refill: 0  - Restart ondansetron (ZOFRAN) 4 MG tablet; Take 2 tablets by mouth daily as needed for Nausea or Vomiting  Dispense: 30 tablet; Refill: 0  - Restart SUMAtriptan (IMITREX) 50 MG tablet; Take 1 tablet at onset of migraine headache. May take additional tablet one hour if no improvement. Do not take more than 2 tablets in 24 hours.  Dispense: 12 tablet; Refill: 2  - Continue Motrin 800 mg TID at onset of migraine- if no improvement take Sumatriptan; patient agreed to plan with verbal understanding.       1. Migraine with aura and without status migrainosus, not intractable  - Improved, stable  -Continue

## 2024-05-20 NOTE — PATIENT INSTRUCTIONS
Anxiety and Depression  - Practice self care and daily fresh air for 20 minutes.  - Regular exercise program (work up to 150 minutes per week)  - Practice self relaxation with music or meditation etc. Phone apps such as [place]: Mediatation and Relaxation, or Calm.    - The crisis number for [facility] is [phone].  You can use this number at any time to access emergency mental health services.    - Text HOME to 028973 from anywhere in the [place], anytime, about any type of crisis.  Crisis Text Line serves anyone, in any type of crisis, providing access to free, 24/7.  The first two responses are automated. They tell you that you're being connected with a Crisis Counselor, and invite you to share a bit more.  The Crisis Counselor is a trained volunteer, not a professional.  It usually takes less than five minutes to connect you with a Crisis Counselor. The goal of any conversation is to get you to a calm, safe place.

## 2024-05-30 ENCOUNTER — PATIENT MESSAGE (OUTPATIENT)
Dept: PRIMARY CARE CLINIC | Age: 29
End: 2024-05-30

## 2024-05-30 DIAGNOSIS — M25.521 RIGHT ELBOW PAIN: Primary | ICD-10-CM

## 2024-05-30 NOTE — TELEPHONE ENCOUNTER
1. Right elbow pain  - Francisco Weiss MD, Orthopedic Primary Care Sports Medicine (Shoulder; Hip; Knee), [facility]

## 2024-06-07 PROBLEM — G89.29 OTHER CHRONIC PAIN: Status: ACTIVE | Noted: 2023-11-29

## 2024-06-07 PROBLEM — K76.0 STEATOSIS OF LIVER: Status: ACTIVE | Noted: 2023-11-29

## 2024-06-07 PROBLEM — F41.1 ANXIETY STATE: Status: ACTIVE | Noted: 2021-12-17

## 2024-06-07 PROBLEM — E28.2 PCOS (POLYCYSTIC OVARIAN SYNDROME): Status: ACTIVE | Noted: 2024-06-07

## 2024-06-07 PROBLEM — R11.0 NAUSEA: Status: ACTIVE | Noted: 2023-11-15

## 2024-06-07 PROBLEM — E66.9 OBESITY, UNSPECIFIED: Status: ACTIVE | Noted: 2023-11-29

## 2024-06-07 PROBLEM — R10.11 ABDOMINAL PAIN, RIGHT UPPER QUADRANT: Status: ACTIVE | Noted: 2023-11-15

## 2024-06-07 PROBLEM — F32.1 MAJOR DEPRESSIVE DISORDER, SINGLE EPISODE, MODERATE (HCC): Status: ACTIVE | Noted: 2023-02-26

## 2024-06-07 PROBLEM — M25.539 PAIN IN JOINT, FOREARM: Status: ACTIVE | Noted: 2023-11-29

## 2024-06-07 RX ORDER — BENZONATATE 200 MG/1
200 CAPSULE ORAL 3 TIMES DAILY PRN
COMMUNITY
Start: 2024-04-11

## 2024-06-10 ENCOUNTER — OFFICE VISIT (OUTPATIENT)
Dept: ORTHOPEDIC SURGERY | Age: 29
End: 2024-06-10
Payer: COMMERCIAL

## 2024-06-10 VITALS — HEIGHT: 63 IN | BODY MASS INDEX: 34.73 KG/M2 | WEIGHT: 196 LBS

## 2024-06-10 DIAGNOSIS — M25.521 ELBOW PAIN, RIGHT: Primary | ICD-10-CM

## 2024-06-10 DIAGNOSIS — D17.21 LIPOMA OF RIGHT UPPER EXTREMITY: ICD-10-CM

## 2024-06-10 DIAGNOSIS — R22.31 MASS OF ELBOW REGION, RIGHT: ICD-10-CM

## 2024-06-10 PROCEDURE — 99244 OFF/OP CNSLTJ NEW/EST MOD 40: CPT | Performed by: INTERNAL MEDICINE

## 2024-06-10 SDOH — HEALTH STABILITY: PHYSICAL HEALTH: ON AVERAGE, HOW MANY MINUTES DO YOU ENGAGE IN EXERCISE AT THIS LEVEL?: 20 MIN

## 2024-06-10 SDOH — HEALTH STABILITY: PHYSICAL HEALTH: ON AVERAGE, HOW MANY DAYS PER WEEK DO YOU ENGAGE IN MODERATE TO STRENUOUS EXERCISE (LIKE A BRISK WALK)?: 3 DAYS

## 2024-06-12 NOTE — PROGRESS NOTES
Chief Complaint:   Chief Complaint   Patient presents with    Elbow Pain     Right Elbow - Pain started a month ago. She noticed a mess that grew bigger over 2 weeks. It hasn't gotten bigger in the last 2 weeks. Hurts to bend her elbow.          History of Present Illness:       Patient is a 28 y.o. female presents with the above complaint.  She is seen in consultation at the request of Afua Bush CNP. The symptoms began 1 monthsago and started without an injury. The patient describes a soft tissue fullness over the extensor aspect of the elbow sometimes associated with pain that does not radiate.  The symptoms are intermittent  and are show no change since the onset.      Mass localizes to the olecranon elbow and  does not seem to follow a typical epicondylitis provacative pattern.  There are not associated mechan CMP.  ical symptoms. There is not neuritic symptoms about the elbow.The patient denies subjective instability about the elbow and denies new onset or progressive weakness of the upper extremity.    Pain level 8    The patient denies a pattern of activity related swelling.      Treatment to date: none    There is nono prior history of elbow trauma.    There is no prior history of autoimmune disease, crystal arthropathy, or crystal arthropathy.      Past Medical History:        Past Medical History:   Diagnosis Date    Abscess 3/10/2022    Asthma     Mental disorder     Migraine     Polycystic ovarian syndrome 12/01/2019    Threatened labor at term 1/6/2023         Past Surgical History:   Procedure Laterality Date    CERVICAL CERCLAGE           Present Medications:         Current Outpatient Medications   Medication Sig Dispense Refill    benzonatate (TESSALON) 200 MG capsule Take 1 capsule by mouth 3 times daily as needed      D3-50 1.25 MG (23805 UT) CAPS Take 1 capsule by mouth once a week      metoprolol (LOPRESSOR) 100 MG tablet Take 1 tablet (100 mg)  by mouth twice daily (morning and bedtime)

## 2024-07-01 ENCOUNTER — OFFICE VISIT (OUTPATIENT)
Dept: SURGERY | Age: 29
End: 2024-07-01
Payer: COMMERCIAL

## 2024-07-01 VITALS
DIASTOLIC BLOOD PRESSURE: 75 MMHG | BODY MASS INDEX: 32.59 KG/M2 | HEART RATE: 79 BPM | WEIGHT: 184 LBS | SYSTOLIC BLOOD PRESSURE: 126 MMHG

## 2024-07-01 DIAGNOSIS — R22.31 ARM MASS, RIGHT: Primary | ICD-10-CM

## 2024-07-01 PROCEDURE — 99204 OFFICE O/P NEW MOD 45 MIN: CPT

## 2024-07-01 NOTE — PROGRESS NOTES
[facility]    CC: right arm mass    Referring Physician: Francisco Ceballos MD.     HPI: This is an 28 y.o.female with a PMHx as delineated below who presents to clinic in consultation for mass of right arm. The patient noticed the lesion for approximately about a month and half ago. It has grown in size and it is bothersome. Plastic surgery was consulted for evaluation and treatment.    PMHx:   Past Medical History:   Diagnosis Date    Abscess 3/10/2022    Asthma     Mental disorder     Migraine     Polycystic ovarian syndrome 12/01/2019    Threatened labor at term 1/6/2023     PSHx:   Past Surgical History:   Procedure Laterality Date    CERVICAL CERCLAGE       Allergy:   Allergies   Allergen Reactions    Acetaminophen Hives and Shortness Of Breath     hard time breathing    Penicillins Hives, Rash and Shortness Of Breath       SHx:   Social History     Socioeconomic History    Marital status: Single     Spouse name: Not on file    Number of children: Not on file    Years of education: Not on file    Highest education level: Not on file   Occupational History    Not on file   Tobacco Use    Smoking status: Never    Smokeless tobacco: Never   Vaping Use    Vaping Use: Never used   Substance and Sexual Activity    Alcohol use: Not Currently    Drug use: Never    Sexual activity: Yes     Partners: Male   Other Topics Concern    Not on file   Social History Narrative    Not on file     Social Determinants of Health     Financial Resource Strain: Low Risk  (5/20/2024)    Overall Financial Resource Strain (CARDIA)     Difficulty of Paying Living Expenses: Not hard at all   Food Insecurity: No Food Insecurity (5/20/2024)    Hunger Vital Sign     Worried About Running Out of Food in the Last Year: Never true     Ran Out of Food in the Last Year: Never true   Transportation Needs: Unknown (5/20/2024)    PRAPARE - Transportation     Lack of Transportation (Medical): Not on file     Lack of

## 2024-07-02 ENCOUNTER — PREP FOR PROCEDURE (OUTPATIENT)
Dept: SURGERY | Age: 29
End: 2024-07-02

## 2024-07-02 ENCOUNTER — TELEPHONE (OUTPATIENT)
Dept: SURGERY | Age: 29
End: 2024-07-02

## 2024-07-02 DIAGNOSIS — R22.31 MASS OF ARM, RIGHT: ICD-10-CM

## 2024-07-02 RX ORDER — SODIUM CHLORIDE 9 MG/ML
INJECTION, SOLUTION INTRAVENOUS PRN
Status: CANCELLED | OUTPATIENT
Start: 2024-07-02

## 2024-07-02 RX ORDER — SODIUM CHLORIDE, SODIUM LACTATE, POTASSIUM CHLORIDE, CALCIUM CHLORIDE 600; 310; 30; 20 MG/100ML; MG/100ML; MG/100ML; MG/100ML
INJECTION, SOLUTION INTRAVENOUS CONTINUOUS
Status: CANCELLED | OUTPATIENT
Start: 2024-07-02

## 2024-07-02 RX ORDER — SODIUM CHLORIDE 0.9 % (FLUSH) 0.9 %
5-40 SYRINGE (ML) INJECTION PRN
Status: CANCELLED | OUTPATIENT
Start: 2024-07-02

## 2024-07-02 RX ORDER — SODIUM CHLORIDE 0.9 % (FLUSH) 0.9 %
5-40 SYRINGE (ML) INJECTION EVERY 12 HOURS SCHEDULED
Status: CANCELLED | OUTPATIENT
Start: 2024-07-02

## 2024-07-02 NOTE — TELEPHONE ENCOUNTER
The patient was in the office to see Torrie yesterday.    PLAN: We will submit to insurance and work to scheduled under sedation at [place].     I received a surgery letter.    I spoke with Kati at [facility] ([phone]) to see if CPT Code 79663 or 74142 require pre certification. No authorization required as long as facility and provider are in network with the plan.    Call Reference # [id number]    I spoke with the patient at the home number listed. The patient is now scheduled for surgery with  on 7-.     The patient is aware of H&P.    The patient is scheduled for her post op appointment 7-.    I will submit the case request to [facility] today.     I will email the surgery information and instructions to the patient today [email or web address].     I will close this phone note.

## 2024-07-08 ENCOUNTER — TELEPHONE (OUTPATIENT)
Dept: PRIMARY CARE CLINIC | Age: 29
End: 2024-07-08

## 2024-07-08 NOTE — TELEPHONE ENCOUNTER
Patient called back returning a phone call for a pre op physical. I saw the note where you tried to call the patient. Patient would like a call back to schedule.     I didn't see any 40 minute physical appts on Caal's schedule and was not sure if we could schedule with Dr. Chin.     Thanks.

## 2024-07-10 NOTE — PROGRESS NOTES
Mildred Beckford    Age 28 y.o.    female    1995    MRN [id number]    7/19/2024  Arrival Time_____________  OR Time____________30 Min     Procedure(s):  EXCISION OF RIGHT ARM MASS, POSSIBLE ADJACENT TISSUE TRANSFER                      General    Surgeon(s):  Koby Eribertofamilia, MD       Phone [phone] (home) [phone] (work)    In\[facility] [place] [facility]\""  Date  Info Source  Home  Cell         Work  _____________________________________________________________________  _____________________________________________________________________  _____________________________________________________________________  _____________________________________________________________________  _____________________________________________________________________    PCP _____________________________ Phone_________________     H&P  ________________  Bringing      Chart              Epic      DOS      Called________  EKG ________________   Bringing      Chart              Epic      DOS      Called________  LABS________________   Bringing     Chart              Epic      DOS      Called________  Cardiac Clearance ______ Bringing      Chart              Epic      DOS      Called________  Pulmonary Clearance____ Bringing      Chart              Epic      DOS      Called________    Cardiologist________________________ Phone___________________________  Pulmonologist_______________________Phone___________________________    ? Advance Directives   ? Sikh concerns / Waiver on Chart            [employer]________________  ? Pre-op Instructions Given /Understood          _________________________________  ? Directions to Surgery Center                          _________________________________  ? Transportation Home_______________      __________________________________  ? Crutches/Walker__________________        __________________________________    Orders: Hard copy/ EPIC                 Transcribed/ EPIC

## 2024-07-12 DIAGNOSIS — R11.0 NAUSEA: ICD-10-CM

## 2024-07-15 RX ORDER — ONDANSETRON 4 MG/1
8 TABLET, FILM COATED ORAL DAILY PRN
Qty: 30 TABLET | Refills: 0 | Status: ON HOLD | OUTPATIENT
Start: 2024-07-15

## 2024-07-15 NOTE — TELEPHONE ENCOUNTER
Recent Visits  Date Type Provider Dept   05/20/24 Office Visit Afua Bush APRN - CNP [facility]   11/29/23 Office Visit Afua Bush APRN - [facility]   11/08/23 Office Visit Afua Bush APRN - [facility]   02/03/23 Office Visit Afua Bush APRN - [facility]   Showing recent visits within past 540 days with a meds authorizing provider and meeting all other requirements  Future Appointments  Date Type Provider Dept   07/17/24 Appointment Afua Bush APRN - CNP [facility]   11/20/24 Appointment Afua Bush APRN - CNP [facility]   Showing future appointments within next 150 days with a meds authorizing provider and meeting all other requirements

## 2024-07-17 ENCOUNTER — OFFICE VISIT (OUTPATIENT)
Dept: PRIMARY CARE CLINIC | Age: 29
End: 2024-07-17
Payer: COMMERCIAL

## 2024-07-17 VITALS
SYSTOLIC BLOOD PRESSURE: 100 MMHG | TEMPERATURE: 98.1 F | WEIGHT: 183 LBS | OXYGEN SATURATION: 98 % | BODY MASS INDEX: 32.43 KG/M2 | HEIGHT: 63 IN | DIASTOLIC BLOOD PRESSURE: 62 MMHG | HEART RATE: 81 BPM

## 2024-07-17 DIAGNOSIS — R22.31 MASS OF ARM, RIGHT: ICD-10-CM

## 2024-07-17 DIAGNOSIS — Z01.818 PREOPERATIVE GENERAL PHYSICAL EXAMINATION: Primary | ICD-10-CM

## 2024-07-17 PROBLEM — R10.11 ABDOMINAL PAIN, RIGHT UPPER QUADRANT: Status: RESOLVED | Noted: 2023-11-15 | Resolved: 2024-07-17

## 2024-07-17 PROBLEM — R11.0 NAUSEA: Status: RESOLVED | Noted: 2023-11-15 | Resolved: 2024-07-17

## 2024-07-17 PROBLEM — G89.29 OTHER CHRONIC PAIN: Status: RESOLVED | Noted: 2023-11-29 | Resolved: 2024-07-17

## 2024-07-17 PROCEDURE — 99214 OFFICE O/P EST MOD 30 MIN: CPT | Performed by: NURSE PRACTITIONER

## 2024-07-17 ASSESSMENT — PATIENT HEALTH QUESTIONNAIRE - PHQ9
1. LITTLE INTEREST OR PLEASURE IN DOING THINGS: NOT AT ALL
SUM OF ALL RESPONSES TO PHQ9 QUESTIONS 1 & 2: 0
6. FEELING BAD ABOUT YOURSELF - OR THAT YOU ARE A FAILURE OR HAVE LET YOURSELF OR YOUR FAMILY DOWN: NOT AT ALL
3. TROUBLE FALLING OR STAYING ASLEEP: SEVERAL DAYS
4. FEELING TIRED OR HAVING LITTLE ENERGY: SEVERAL DAYS
10. IF YOU CHECKED OFF ANY PROBLEMS, HOW DIFFICULT HAVE THESE PROBLEMS MADE IT FOR YOU TO DO YOUR WORK, TAKE CARE OF THINGS AT HOME, OR GET ALONG WITH OTHER PEOPLE: NOT DIFFICULT AT ALL
SUM OF ALL RESPONSES TO PHQ QUESTIONS 1-9: 4
5. POOR APPETITE OR OVEREATING: MORE THAN HALF THE DAYS
8. MOVING OR SPEAKING SO SLOWLY THAT OTHER PEOPLE COULD HAVE NOTICED. OR THE OPPOSITE, BEING SO FIGETY OR RESTLESS THAT YOU HAVE BEEN MOVING AROUND A LOT MORE THAN USUAL: NOT AT ALL
7. TROUBLE CONCENTRATING ON THINGS, SUCH AS READING THE NEWSPAPER OR WATCHING TELEVISION: NOT AT ALL
2. FEELING DOWN, DEPRESSED OR HOPELESS: NOT AT ALL
SUM OF ALL RESPONSES TO PHQ QUESTIONS 1-9: 4
9. THOUGHTS THAT YOU WOULD BE BETTER OFF DEAD, OR OF HURTING YOURSELF: NOT AT ALL

## 2024-07-17 ASSESSMENT — ANXIETY QUESTIONNAIRES
5. BEING SO RESTLESS THAT IT IS HARD TO SIT STILL: NOT AT ALL
IF YOU CHECKED OFF ANY PROBLEMS ON THIS QUESTIONNAIRE, HOW DIFFICULT HAVE THESE PROBLEMS MADE IT FOR YOU TO DO YOUR WORK, TAKE CARE OF THINGS AT HOME, OR GET ALONG WITH OTHER PEOPLE: SOMEWHAT DIFFICULT
3. WORRYING TOO MUCH ABOUT DIFFERENT THINGS: SEVERAL DAYS
6. BECOMING EASILY ANNOYED OR IRRITABLE: SEVERAL DAYS
4. TROUBLE RELAXING: NOT AT ALL
1. FEELING NERVOUS, ANXIOUS, OR ON EDGE: SEVERAL DAYS
2. NOT BEING ABLE TO STOP OR CONTROL WORRYING: SEVERAL DAYS
GAD7 TOTAL SCORE: 4
7. FEELING AFRAID AS IF SOMETHING AWFUL MIGHT HAPPEN: NOT AT ALL

## 2024-07-17 NOTE — PROGRESS NOTES
Subjective:      Mildred Beckford is a 28 y.o. female who presents to the office today for a preoperative consultation at the request of surgeon, Dr. Julio Whalen MD who plans to perform EXCISION OF RIGHT ARM MASS, POSSIBLE ADJACENT TISSUE TRANSFER on 7/19/24.     Planned anesthesia is General.    The patient has the following known anesthesia issues:  N/A (has never been under anesthesia). Denies family hx of anesthesia issues .  Patient has a bleeding risk of : No recent abnormal bleeding, no remote history of abnormal bleeding    Patient does not have objection to receiving blood products if needed.    Past Medical History:   Diagnosis Date    Abdominal pain, right upper quadrant 11/15/2023    Abscess 03/10/2022    Anxiety     Asthma     Mental disorder     Migraine     Nausea 11/15/2023    Polycystic ovarian syndrome 12/01/2019    Sinus tachycardia 12/31/2015    Threatened labor at term 01/06/2023     Patient Active Problem List    Diagnosis Date Noted    Current moderate episode of major depressive disorder without prior episode (HCC) 02/26/2023    Mass of arm, right 07/02/2024    PCOS (polycystic ovarian syndrome) 06/07/2024    Fatty liver 11/29/2023    Class 1 obesity due to excess calories without serious comorbidity with body mass index (BMI) of 32.0 to 32.9 in adult 11/29/2023    Steatosis of liver 11/29/2023    Obesity, unspecified 11/29/2023    Pain in joint, forearm 11/29/2023    Major depressive disorder, single episode, moderate (HCC) 02/26/2023    Gastroesophageal reflux disease without esophagitis 03/10/2022    Anxiety 12/17/2021    Anxiety state 12/17/2021    Chronic pain of right wrist 09/22/2021    Migraine with aura and without status migrainosus, not intractable 04/30/2021     Past Surgical History:   Procedure Laterality Date    CERVICAL CERCLAGE       Family History   Problem Relation Age of Onset    High Blood Pressure Mother     Heart Disease Father     High Blood Pressure Father

## 2024-07-18 ENCOUNTER — HOSPITAL ENCOUNTER (OUTPATIENT)
Age: 29
Discharge: HOME | End: 2024-07-18
Payer: COMMERCIAL

## 2024-07-18 DIAGNOSIS — Z01.818 PREOPERATIVE GENERAL PHYSICAL EXAMINATION: ICD-10-CM

## 2024-07-18 LAB
ALBUMIN SERPL-MCNC: 4.4 G/DL (ref 3.4–5)
ALBUMIN/GLOB SERPL: 1.3 {RATIO} (ref 1.1–2.2)
ALP SERPL-CCNC: 105 U/L (ref 40–129)
ALT SERPL-CCNC: 26 U/L (ref 10–40)
ANION GAP SERPL CALCULATED.3IONS-SCNC: 10 MMOL/L (ref 3–16)
APTT BLD: 28.7 SEC (ref 22.1–36.4)
AST SERPL-CCNC: 29 U/L (ref 15–37)
BASOPHILS # BLD: 0.1 K/UL (ref 0–0.2)
BASOPHILS NFR BLD: 0.6 %
BILIRUB SERPL-MCNC: <0.2 MG/DL (ref 0–1)
BUN SERPL-MCNC: 12 MG/DL (ref 7–20)
CALCIUM SERPL-MCNC: 9 MG/DL (ref 8.3–10.6)
CHLORIDE SERPL-SCNC: 105 MMOL/L (ref 99–110)
CO2 SERPL-SCNC: 24 MMOL/L (ref 21–32)
CREAT SERPL-MCNC: 0.6 MG/DL (ref 0.6–1.1)
DEPRECATED RDW RBC AUTO: 16.9 % (ref 12.4–15.4)
EKG ATRIAL RATE: 62 BPM
EKG DIAGNOSIS: NORMAL
EKG P AXIS: 56 DEGREES
EKG P-R INTERVAL: 166 MS
EKG Q-T INTERVAL: 440 MS
EKG QRS DURATION: 94 MS
EKG QTC CALCULATION (BAZETT): 446 MS
EKG R AXIS: 47 DEGREES
EKG T AXIS: 43 DEGREES
EKG VENTRICULAR RATE: 62 BPM
EOSINOPHIL # BLD: 0.2 K/UL (ref 0–0.6)
EOSINOPHIL NFR BLD: 2.4 %
GFR SERPLBLD CREATININE-BSD FMLA CKD-EPI: >90 ML/MIN/{1.73_M2}
GLUCOSE P FAST SERPL-MCNC: 87 MG/DL (ref 70–99)
HCT VFR BLD AUTO: 36.6 % (ref 36–48)
HGB BLD-MCNC: 11.9 G/DL (ref 12–16)
INR PPP: 0.96 (ref 0.85–1.15)
LYMPHOCYTES # BLD: 2.5 K/UL (ref 1–5.1)
LYMPHOCYTES NFR BLD: 26.3 %
MCH RBC QN AUTO: 25.6 PG (ref 26–34)
MCHC RBC AUTO-ENTMCNC: 32.4 G/DL (ref 31–36)
MCV RBC AUTO: 79.1 FL (ref 80–100)
MONOCYTES # BLD: 0.9 K/UL (ref 0–1.3)
MONOCYTES NFR BLD: 9.4 %
NEUTROPHILS # BLD: 5.9 K/UL (ref 1.7–7.7)
NEUTROPHILS NFR BLD: 61.3 %
PLATELET # BLD AUTO: 315 K/UL (ref 135–450)
PMV BLD AUTO: 9.2 FL (ref 5–10.5)
POTASSIUM SERPL-SCNC: 3.9 MMOL/L (ref 3.5–5.1)
PROT SERPL-MCNC: 7.8 G/DL (ref 6.4–8.2)
PROTHROMBIN TIME: 13 SEC (ref 11.9–14.9)
RBC # BLD AUTO: 4.63 M/UL (ref 4–5.2)
SODIUM SERPL-SCNC: 139 MMOL/L (ref 136–145)
WBC # BLD AUTO: 9.7 K/UL (ref 4–11)

## 2024-07-18 PROCEDURE — 85610 PROTHROMBIN TIME: CPT

## 2024-07-18 PROCEDURE — 36415 COLL VENOUS BLD VENIPUNCTURE: CPT

## 2024-07-18 PROCEDURE — 85730 THROMBOPLASTIN TIME PARTIAL: CPT

## 2024-07-18 PROCEDURE — 80053 COMPREHEN METABOLIC PANEL: CPT

## 2024-07-18 PROCEDURE — 85025 COMPLETE CBC W/AUTO DIFF WBC: CPT

## 2024-07-18 NOTE — PROGRESS NOTES
Date and time of surgery : 7/19/24 at 1330             Arrival Time:  1130     Bring Picture ID and insurance card.  Please wear simple, loose fitting clothing to the hospital.   Do not bring valuables (money, credit cards, checkbooks, etc.)   Do not wear any makeup (including  eye makeup) and no nail polish or artificial nails on your fingers or toes.  DO NOT wear any jewelry or piercings on day of surgery.  All body piercing jewelry must be removed.  If you have dentures, they will be removed before going to the OR; we will provide you a container.  If you wear contact lenses or glasses, they will be removed; please bring a case for them.  Shower the evening before or morning of surgery with antibacterial soap.  Nothing to eat or drink after midnight the day before surgery.   You may brush your teeth and gargle the morning of surgery.  DO NOT SWALLOW WATER.   The morning of your procedure take only Sertraline, Metoprolol and Pantoprazole with a sip of water.  Aspirin, Ibuprofen, Advil, Naproxen, Vitamin E and other Anti-inflammatory products and supplements should be stopped for 5 -7days before surgery or as directed by your physician.  Do not smoke or drink any alcoholic beverages 24 hours prior to surgery.  This includes NA Beer. Refrain from the usage of any recreational drugs, including non-prescribed prescription drugs.   You MUST plan for a responsible adult to stay on site while you are here and take you home after your surgery. You will not be allowed to leave alone or drive yourself home. It is strongly suggested someone stay with you the first 24 hrs. Your surgery will be cancelled if you do not have a ride home.  To help prevent infection, change your sheets the night before surgery.   If you  have a Living Will and Durable Power of  for Healthcare, please bring in a copy.  Notify your Surgeon if you develop any illness between now and time of surgery. Cough, cold, fever, sore throat, nausea,

## 2024-07-19 ENCOUNTER — ANESTHESIA EVENT (OUTPATIENT)
Dept: OPERATING ROOM | Age: 29
End: 2024-07-19
Payer: COMMERCIAL

## 2024-07-19 ENCOUNTER — ANESTHESIA (OUTPATIENT)
Dept: OPERATING ROOM | Age: 29
End: 2024-07-19
Payer: COMMERCIAL

## 2024-07-19 ENCOUNTER — HOSPITAL ENCOUNTER (OUTPATIENT)
Age: 29
Setting detail: OUTPATIENT SURGERY
Discharge: HOME | End: 2024-07-19
Attending: SURGERY | Admitting: SURGERY
Payer: COMMERCIAL

## 2024-07-19 VITALS
BODY MASS INDEX: 32.43 KG/M2 | TEMPERATURE: 97.8 F | WEIGHT: 183 LBS | OXYGEN SATURATION: 99 % | RESPIRATION RATE: 16 BRPM | HEIGHT: 63 IN | SYSTOLIC BLOOD PRESSURE: 108 MMHG | HEART RATE: 92 BPM | DIASTOLIC BLOOD PRESSURE: 73 MMHG

## 2024-07-19 DIAGNOSIS — R22.31 MASS OF ARM, RIGHT: ICD-10-CM

## 2024-07-19 LAB — HCG UR QL: NEGATIVE

## 2024-07-19 PROCEDURE — 2709999900 HC NON-CHARGEABLE SUPPLY: Performed by: SURGERY

## 2024-07-19 PROCEDURE — A4217 STERILE WATER/SALINE, 500 ML: HCPCS | Performed by: SURGERY

## 2024-07-19 PROCEDURE — 24076 EX ARM/ELBOW TUM DEEP < 5 CM: CPT | Performed by: SURGERY

## 2024-07-19 PROCEDURE — 7100000000 HC PACU RECOVERY - FIRST 15 MIN: Performed by: SURGERY

## 2024-07-19 PROCEDURE — 7100000011 HC PHASE II RECOVERY - ADDTL 15 MIN: Performed by: SURGERY

## 2024-07-19 PROCEDURE — 2580000003 HC RX 258: Performed by: SURGERY

## 2024-07-19 PROCEDURE — 3700000001 HC ADD 15 MINUTES (ANESTHESIA): Performed by: SURGERY

## 2024-07-19 PROCEDURE — 2500000003 HC RX 250 WO HCPCS: Performed by: NURSE ANESTHETIST, CERTIFIED REGISTERED

## 2024-07-19 PROCEDURE — 6360000002 HC RX W HCPCS

## 2024-07-19 PROCEDURE — 3600000013 HC SURGERY LEVEL 3 ADDTL 15MIN: Performed by: SURGERY

## 2024-07-19 PROCEDURE — 2580000003 HC RX 258: Performed by: ANESTHESIOLOGY

## 2024-07-19 PROCEDURE — 88304 TISSUE EXAM BY PATHOLOGIST: CPT

## 2024-07-19 PROCEDURE — 3600000003 HC SURGERY LEVEL 3 BASE: Performed by: SURGERY

## 2024-07-19 PROCEDURE — 6360000002 HC RX W HCPCS: Performed by: NURSE ANESTHETIST, CERTIFIED REGISTERED

## 2024-07-19 PROCEDURE — 7100000010 HC PHASE II RECOVERY - FIRST 15 MIN: Performed by: SURGERY

## 2024-07-19 PROCEDURE — 3700000000 HC ANESTHESIA ATTENDED CARE: Performed by: SURGERY

## 2024-07-19 PROCEDURE — 7100000001 HC PACU RECOVERY - ADDTL 15 MIN: Performed by: SURGERY

## 2024-07-19 PROCEDURE — 84703 CHORIONIC GONADOTROPIN ASSAY: CPT

## 2024-07-19 RX ORDER — SODIUM CHLORIDE 0.9 % (FLUSH) 0.9 %
5-40 SYRINGE (ML) INJECTION EVERY 12 HOURS SCHEDULED
Status: DISCONTINUED | OUTPATIENT
Start: 2024-07-19 | End: 2024-07-19 | Stop reason: HOSPADM

## 2024-07-19 RX ORDER — SODIUM CHLORIDE 0.9 % (FLUSH) 0.9 %
5-40 SYRINGE (ML) INJECTION EVERY 12 HOURS SCHEDULED
Status: CANCELLED | OUTPATIENT
Start: 2024-07-19

## 2024-07-19 RX ORDER — SODIUM CHLORIDE 0.9 % (FLUSH) 0.9 %
5-40 SYRINGE (ML) INJECTION PRN
Status: DISCONTINUED | OUTPATIENT
Start: 2024-07-19 | End: 2024-07-19 | Stop reason: HOSPADM

## 2024-07-19 RX ORDER — MEPERIDINE HYDROCHLORIDE 50 MG/ML
12.5 INJECTION INTRAMUSCULAR; INTRAVENOUS; SUBCUTANEOUS EVERY 5 MIN PRN
Status: CANCELLED | OUTPATIENT
Start: 2024-07-19

## 2024-07-19 RX ORDER — SODIUM CHLORIDE, SODIUM LACTATE, POTASSIUM CHLORIDE, CALCIUM CHLORIDE 600; 310; 30; 20 MG/100ML; MG/100ML; MG/100ML; MG/100ML
INJECTION, SOLUTION INTRAVENOUS CONTINUOUS
Status: DISCONTINUED | OUTPATIENT
Start: 2024-07-19 | End: 2024-07-19 | Stop reason: HOSPADM

## 2024-07-19 RX ORDER — PROCHLORPERAZINE EDISYLATE 5 MG/ML
5 INJECTION INTRAMUSCULAR; INTRAVENOUS
Status: CANCELLED | OUTPATIENT
Start: 2024-07-19 | End: 2024-07-20

## 2024-07-19 RX ORDER — SODIUM CHLORIDE 9 MG/ML
INJECTION, SOLUTION INTRAVENOUS PRN
Status: DISCONTINUED | OUTPATIENT
Start: 2024-07-19 | End: 2024-07-19 | Stop reason: HOSPADM

## 2024-07-19 RX ORDER — LIDOCAINE HYDROCHLORIDE 20 MG/ML
INJECTION, SOLUTION INFILTRATION; PERINEURAL PRN
Status: DISCONTINUED | OUTPATIENT
Start: 2024-07-19 | End: 2024-07-19 | Stop reason: SDUPTHER

## 2024-07-19 RX ORDER — OXYCODONE HYDROCHLORIDE 5 MG/1
10 TABLET ORAL PRN
Status: CANCELLED | OUTPATIENT
Start: 2024-07-19

## 2024-07-19 RX ORDER — ONDANSETRON 2 MG/ML
4 INJECTION INTRAMUSCULAR; INTRAVENOUS
Status: CANCELLED | OUTPATIENT
Start: 2024-07-19 | End: 2024-07-20

## 2024-07-19 RX ORDER — FENTANYL CITRATE 50 UG/ML
INJECTION, SOLUTION INTRAMUSCULAR; INTRAVENOUS PRN
Status: DISCONTINUED | OUTPATIENT
Start: 2024-07-19 | End: 2024-07-19 | Stop reason: SDUPTHER

## 2024-07-19 RX ORDER — NALOXONE HYDROCHLORIDE 0.4 MG/ML
INJECTION, SOLUTION INTRAMUSCULAR; INTRAVENOUS; SUBCUTANEOUS PRN
Status: CANCELLED | OUTPATIENT
Start: 2024-07-19

## 2024-07-19 RX ORDER — MAGNESIUM HYDROXIDE 1200 MG/15ML
LIQUID ORAL CONTINUOUS PRN
Status: COMPLETED | OUTPATIENT
Start: 2024-07-19 | End: 2024-07-19

## 2024-07-19 RX ORDER — IPRATROPIUM BROMIDE AND ALBUTEROL SULFATE 2.5; .5 MG/3ML; MG/3ML
1 SOLUTION RESPIRATORY (INHALATION)
Status: CANCELLED | OUTPATIENT
Start: 2024-07-19 | End: 2024-07-20

## 2024-07-19 RX ORDER — OXYCODONE HYDROCHLORIDE 5 MG/1
5 TABLET ORAL
Status: CANCELLED | OUTPATIENT
Start: 2024-07-19

## 2024-07-19 RX ORDER — PROPOFOL 10 MG/ML
INJECTION, EMULSION INTRAVENOUS PRN
Status: DISCONTINUED | OUTPATIENT
Start: 2024-07-19 | End: 2024-07-19 | Stop reason: SDUPTHER

## 2024-07-19 RX ORDER — SODIUM CHLORIDE 0.9 % (FLUSH) 0.9 %
5-40 SYRINGE (ML) INJECTION PRN
Status: CANCELLED | OUTPATIENT
Start: 2024-07-19

## 2024-07-19 RX ORDER — CEFAZOLIN SODIUM IN 0.9 % NACL 2 G/100 ML
2000 PLASTIC BAG, INJECTION (ML) INTRAVENOUS
Status: COMPLETED | OUTPATIENT
Start: 2024-07-19 | End: 2024-07-19

## 2024-07-19 RX ORDER — SODIUM CHLORIDE 9 MG/ML
INJECTION, SOLUTION INTRAVENOUS PRN
Status: CANCELLED | OUTPATIENT
Start: 2024-07-19

## 2024-07-19 RX ORDER — LIDOCAINE HYDROCHLORIDE 10 MG/ML
0.3 INJECTION, SOLUTION EPIDURAL; INFILTRATION; INTRACAUDAL; PERINEURAL
Status: DISCONTINUED | OUTPATIENT
Start: 2024-07-19 | End: 2024-07-19 | Stop reason: HOSPADM

## 2024-07-19 RX ORDER — ONDANSETRON 2 MG/ML
INJECTION INTRAMUSCULAR; INTRAVENOUS PRN
Status: DISCONTINUED | OUTPATIENT
Start: 2024-07-19 | End: 2024-07-19 | Stop reason: SDUPTHER

## 2024-07-19 RX ADMIN — LIDOCAINE HYDROCHLORIDE 50 MG: 20 INJECTION, SOLUTION INFILTRATION; PERINEURAL at 13:54

## 2024-07-19 RX ADMIN — FENTANYL CITRATE 100 MCG: 50 INJECTION, SOLUTION INTRAMUSCULAR; INTRAVENOUS at 13:55

## 2024-07-19 RX ADMIN — Medication 2000 MG: at 13:54

## 2024-07-19 RX ADMIN — PROPOFOL 150 MG: 10 INJECTION, EMULSION INTRAVENOUS at 13:54

## 2024-07-19 RX ADMIN — SODIUM CHLORIDE, SODIUM LACTATE, POTASSIUM CHLORIDE, AND CALCIUM CHLORIDE: .6; .31; .03; .02 INJECTION, SOLUTION INTRAVENOUS at 13:45

## 2024-07-19 RX ADMIN — ONDANSETRON 4 MG: 2 INJECTION INTRAMUSCULAR; INTRAVENOUS at 13:57

## 2024-07-19 NOTE — PROGRESS NOTES
Patient arrived to PACU bay 8, phase one initiated. Placed on bedside monitor, VSS. Report obtained from OR RN and anesthesia. Patient on room air.  Assessment WNL. Warm blankets applied. Side rails in place, will monitor patient closely.

## 2024-07-19 NOTE — PROGRESS NOTES
Patient admitted to [facility] bay 4 in preparation for surgery, VSS. Consent confirmed. IV inserted into LAC with ultrasound, LR infusing. Belongings on cart to PACU. NPO since midnight. Family waiting in car, phone number in system for text updates.

## 2024-07-19 NOTE — H&P
Update History & Physical    The patient's History and Physical of July 17, 2024 was reviewed with the patient and I examined the patient. There was no change. The surgical site was confirmed by the patient and me.       Plan: The risks, benefits, expected outcome, and alternative to the recommended procedure have been discussed with the patient. Patient understands and wants to proceed with the procedure.     Electronically signed by Julio Whalen MD on 7/19/2024 at 12:26 PM

## 2024-07-19 NOTE — ANESTHESIA POSTPROCEDURE EVALUATION
Department of Anesthesiology  Postprocedure Note    Patient: Mildred Beckford  MRN: [id number]  YOB: 1995  Date of evaluation: 7/19/2024    Procedure Summary       Date: 07/19/24 Room / Location: [address]    Anesthesia Start: 1347 Anesthesia Stop: 1432    Procedure: EXCISION OF RIGHT ARM MASS (Arm Upper) Diagnosis:       Mass of arm, right      (Mass of arm, right [R22.31])    Surgeons: Julio Whalen MD Responsible Provider: William Dobbins DO    Anesthesia Type: general ASA Status: 2            Anesthesia Type: No value filed.    [place] Phase I: Carolina Score: 10    Carolina Phase II:      Anesthesia Post Evaluation    Patient location during evaluation: PACU  Patient participation: complete - patient participated  Level of consciousness: awake and alert  Airway patency: patent  Nausea & Vomiting: no nausea and no vomiting  Cardiovascular status: hemodynamically stable  Respiratory status: acceptable  Hydration status: euvolemic  Pain management: adequate    No notable events documented.

## 2024-07-19 NOTE — ANESTHESIA PRE PROCEDURE
Department of Anesthesiology  Preprocedure Note       Name:  Mildred Beckford   Age:  28 y.o.  :  1995                                          MRN:  [id number]         Date:  2024      Surgeon: Surgeon(s):  Julio Whalen MD    Procedure: Procedure(s):  EXCISION OF RIGHT ARM MASS, POSSIBLE ADJACENT TISSUE TRANSFER    Medications prior to admission:   Prior to Admission medications    Medication Sig Start Date End Date Taking? Authorizing Provider   ondansetron (ZOFRAN) 4 MG tablet TAKE 2 TABLETS BY MOUTH DAILY AS NEEDED FOR NAUSEA OR VOMITING 7/15/24   Afua Bush APRN - CNP   D3-50 1.25 MG (86702 UT) CAPS Take 1 capsule by mouth once a week 3/27/24   Provider, MD Ruy   metoprolol (LOPRESSOR) 100 MG tablet Take 1 tablet (100 mg)  by mouth twice daily (morning and bedtime) 24   Afua Bush APRN - CNP   pantoprazole (PROTONIX) 40 MG tablet TAKE 1 TABLET BY MOUTH TWICE DAILY (BEFORE BREAKFAST AND BEFORE DINNER) 24   Afua Bush APRN - CNP   sertraline (ZOLOFT) 100 MG tablet Take 1 tablet by mouth daily 24  Afua Bush APRN - CNP   ibuprofen (ADVIL;MOTRIN) 800 MG tablet Take 1 tablet by mouth every 8 hours as needed (headache) 24  Afua Bush APRN - CNP   albuterol sulfate HFA (VENTOLIN HFA) 108 (90 Base) MCG/ACT inhaler Inhale 2 puffs into the lungs 4 times daily as needed for Wheezing 23   Afua Bush APRN - CNP   busPIRone (BUSPAR) 5 MG tablet TAKE 1 TABLET BY MOUTH THREE TIMES A DAY (EVERY 8 HOURS) AS NEEDED FOR ANXIETY 23   Afua Bush APRN - CNP   SUMAtriptan (IMITREX) 50 MG tablet Take 1 tablet at onset of migraine headache. May take additional tablet one hour if no improvement. Do not take more than 2 tablets in 24 hours. 23   Afua Bush APRN - CNP       Current medications:    Current Facility-Administered Medications   Medication Dose Route Frequency Provider Last Rate Last Admin   • lidocaine PF 1 % injection

## 2024-07-19 NOTE — DISCHARGE INSTRUCTIONS
[facility]    Mitchell Whalen MD  [address] [facility] ([address])  [place] [id number] [phone]    Post-op Instructions  ___________________________________________    Skin Lesion Removal Instructions    The following instructions will help you know what to expect in the days following your surgery. Do not, however, hesitate to call if you have questions or concerns.    Activities   You may resume your regular activity. However,  avoid vigorous activity until seen after your 1 week visit.    Diet   Resume your preoperative diet as tolerated. Increasing the amount of protein and eliminating unhealthy fats can improve your wound healing and lead to an improved outcome.     Special Instructions / Medications  Follow these instructions for another 2 weeks AFTER your surgery     There is absolutely NO driving while on pain medication or Valium®     Do NOT take any of the following products:   Aspirin/low-dose aspirin   Ibuprofen (Advil®, Motrin®)   Naprosyn or Naproxen (Aleve®)   Vitamin E (even small amounts in multiple vitamins)   Herbals or homeopathic medicines or green tea   Growth hormone   Diet pills (Meridia®, Metabolife®, etc)      TYLENOL-containing products (acetaminophen) are safe to take. (If you are not sure what products to take or avoid, call the office.)    Pain Control   You may be prescribed a narcotic pain medication for the initial postoperative period.  Take only as instructed as needed for pain.   As mentioned above, you can take Tylenol for your pain control, however some pain medication may have Tylenol included in the ingredients (e.g. Percocet®, Vicodin®). Make sure that you do not take more than 4 grams of Tylenol in a single day. If you have any questions, you can contact the office.    Wound Care   Keep your bandage clean and dry for 24 hours (if you have one)   After 24 hours, the bandage may be removed and you can shower.   If you have flesh colored

## 2024-07-19 NOTE — OP NOTE
[facility] PLASTIC & RECONSTRUCTIVE SURGERY     OPERATIVE DICTATION    NAME: Mildred Beckford   MRN: [id number]  DATE: 7/19/2024     AGE: 28 y.o.    LOCATION: [facility]    PREOPERATIVE DIAGNOSIS:  Right elbow mass     POSTOPERATIVE DIAGNOSIS:  Same.     OPERATION PERFORMED: 1) Excision of right elbow mass (4 x 4 cm)     SURGEON:  Julio Whalen MD     ANESTHESIA:  General     ESTIMATED BLOOD LOSS:  Minimal     DRAINS:  None     SPECIMENS: LIpoma     OPERATIVE INDICATIONS:  This is a 28 y.o. female who presented to the office in consultation for a right posterior arm mass that has been growing in size and causing her pain.  The patient desired excision and a thorough discussion regarding the risks, benefits, alternatives, outcomes, and personnel involved was performed with the patient.  After all questions were answered to the patient's satisfaction, they agreed to proceed.    OPERATIVE PROCEDURE:  The patient was marked in the preoperative holding area and then brought to the operating room and placed in the supine position on the operating room table. She underwent general anesthesia and was then placed in the left lateral decubitus position with the right side up. The patient was prepped and draped in the usual sterile manner.  A time-out was performed confirming the patient and the procedure to be performed.  The operation commenced by incising the mass.  The mass was dissected free and identified as being a lipoma that taken off the triceps muscle.  Hemostasis was obtained with electrocautery. The wound was then closed with 3-0 Monocryl and dressings applied.    The patient was placed supine, extubated, and taken to the PACU in stable condition. There were no immediate complications and the patient tolerated the procedure well. At the end of the case, all counts were correct.    JULIO WHALEN MD

## 2024-07-22 DIAGNOSIS — M25.521 RIGHT ELBOW PAIN: ICD-10-CM

## 2024-07-22 DIAGNOSIS — G43.109 MIGRAINE WITH AURA AND WITHOUT STATUS MIGRAINOSUS, NOT INTRACTABLE: ICD-10-CM

## 2024-07-22 RX ORDER — IBUPROFEN 800 MG/1
TABLET ORAL
Qty: 90 TABLET | Refills: 0 | Status: SHIPPED | OUTPATIENT
Start: 2024-07-22

## 2024-10-23 DIAGNOSIS — G43.109 MIGRAINE WITH AURA AND WITHOUT STATUS MIGRAINOSUS, NOT INTRACTABLE: ICD-10-CM

## 2024-10-23 DIAGNOSIS — M25.521 RIGHT ELBOW PAIN: ICD-10-CM

## 2024-10-23 RX ORDER — IBUPROFEN 800 MG/1
TABLET, FILM COATED ORAL
Qty: 90 TABLET | Refills: 0 | Status: SHIPPED | OUTPATIENT
Start: 2024-10-23

## 2024-10-23 NOTE — TELEPHONE ENCOUNTER
Recent Visits  Date Type Provider Dept   07/17/24 Office Visit Afua Bush APRN - [facility]   05/20/24 Office Visit Afua Bush APRN - [facility]   11/29/23 Office Visit Afua Bush APRN - [facility]   11/08/23 Office Visit Afua Bush APRN - [facility]   Showing recent visits within past 540 days with a meds authorizing provider and meeting all other requirements  Future Appointments  Date Type Provider Dept   11/20/24 Appointment Afua Bush APRN - [facility]   Showing future appointments within next 150 days with a meds authorizing provider and meeting all other requirements

## 2024-11-10 DIAGNOSIS — R11.0 NAUSEA: ICD-10-CM

## 2024-11-11 RX ORDER — ONDANSETRON 4 MG/1
8 TABLET, FILM COATED ORAL DAILY PRN
Qty: 30 TABLET | Refills: 0 | Status: SHIPPED | OUTPATIENT
Start: 2024-11-11

## 2024-12-31 DIAGNOSIS — K21.9 GASTROESOPHAGEAL REFLUX DISEASE WITHOUT ESOPHAGITIS: ICD-10-CM

## 2025-01-02 RX ORDER — PANTOPRAZOLE SODIUM 40 MG/1
TABLET, DELAYED RELEASE ORAL
Qty: 180 TABLET | Refills: 1 | Status: SHIPPED | OUTPATIENT
Start: 2025-01-02

## 2025-01-02 RX ORDER — ALBUTEROL SULFATE 90 UG/1
2 INHALANT RESPIRATORY (INHALATION) 4 TIMES DAILY PRN
Qty: 54 G | Refills: 1 | Status: SHIPPED | OUTPATIENT
Start: 2025-01-02

## 2025-01-02 NOTE — TELEPHONE ENCOUNTER
Medication:   Requested Prescriptions     Pending Prescriptions Disp Refills    albuterol sulfate HFA (VENTOLIN HFA) 108 (90 Base) MCG/ACT inhaler 54 g 1     Sig: Inhale 2 puffs into the lungs 4 times daily as needed for Wheezing    pantoprazole (PROTONIX) 40 MG tablet 180 tablet 1     Sig: TAKE 1 TABLET BY MOUTH TWICE DAILY (BEFORE BREAKFAST AND BEFORE DINNER)      Last Filled:  5/20/24    Patient Phone Number: [phone] (home) [phone] (work)    Last appt: 7/17/2024   Next appt: Visit date not found    Last OARRS:        No data to display              PDMP Monitoring:    Last PDMP Omar as Reviewed ([place]):  Review User Review Instant Review Result          Preferred Pharmacy:     [facility]/pharmacy #[id number] - [place], [place] - [address] [place] [phone] - F [phone]  [address] [address]  Phone: [phone] Fax: [phone]    Recent Visits  Date Type Provider Dept   07/17/24 Office Visit Afua Bush APRN - [facility]   05/20/24 Office Visit Afua Bush APRN - [facility]   11/29/23 Office Visit Afua Bush APRN - [facility]   11/08/23 Office Visit Afua Bush APRN - [facility]   Showing recent visits within past 540 days with a meds authorizing provider and meeting all other requirements  Future Appointments  No visits were found meeting these conditions.  Showing future appointments within next 150 days with a meds authorizing provider and meeting all other requirements     7/17/2024

## 2025-02-03 DIAGNOSIS — R11.0 NAUSEA: ICD-10-CM

## 2025-02-03 RX ORDER — ONDANSETRON 4 MG/1
8 TABLET, FILM COATED ORAL DAILY PRN
Qty: 30 TABLET | Refills: 0 | Status: SHIPPED | OUTPATIENT
Start: 2025-02-03

## 2025-07-03 DIAGNOSIS — K21.9 GASTROESOPHAGEAL REFLUX DISEASE WITHOUT ESOPHAGITIS: Primary | ICD-10-CM

## 2025-07-03 RX ORDER — PANTOPRAZOLE SODIUM 40 MG/1
TABLET, DELAYED RELEASE ORAL
Qty: 60 TABLET | Refills: 0 | Status: SHIPPED | OUTPATIENT
Start: 2025-07-03 | End: 2025-08-04

## 2025-08-03 DIAGNOSIS — K21.9 GASTROESOPHAGEAL REFLUX DISEASE WITHOUT ESOPHAGITIS: Primary | ICD-10-CM

## 2025-08-04 RX ORDER — PANTOPRAZOLE SODIUM 40 MG/1
TABLET, DELAYED RELEASE ORAL
Qty: 180 TABLET | Refills: 1 | Status: SHIPPED | OUTPATIENT
Start: 2025-08-04

## 2025-08-04 SDOH — ECONOMIC STABILITY: FOOD INSECURITY: WITHIN THE PAST 12 MONTHS, YOU WORRIED THAT YOUR FOOD WOULD RUN OUT BEFORE YOU GOT MONEY TO BUY MORE.: NEVER TRUE

## 2025-08-04 SDOH — ECONOMIC STABILITY: INCOME INSECURITY: IN THE LAST 12 MONTHS, WAS THERE A TIME WHEN YOU WERE NOT ABLE TO PAY THE MORTGAGE OR RENT ON TIME?: NO

## 2025-08-04 SDOH — ECONOMIC STABILITY: FOOD INSECURITY: WITHIN THE PAST 12 MONTHS, THE FOOD YOU BOUGHT JUST DIDN'T LAST AND YOU DIDN'T HAVE MONEY TO GET MORE.: NEVER TRUE

## 2025-08-04 ASSESSMENT — PATIENT HEALTH QUESTIONNAIRE - PHQ9
SUM OF ALL RESPONSES TO PHQ QUESTIONS 1-9: 3
SUM OF ALL RESPONSES TO PHQ QUESTIONS 1-9: 3
4. FEELING TIRED OR HAVING LITTLE ENERGY: SEVERAL DAYS
8. MOVING OR SPEAKING SO SLOWLY THAT OTHER PEOPLE COULD HAVE NOTICED. OR THE OPPOSITE - BEING SO FIDGETY OR RESTLESS THAT YOU HAVE BEEN MOVING AROUND A LOT MORE THAN USUAL: NOT AT ALL
4. FEELING TIRED OR HAVING LITTLE ENERGY: SEVERAL DAYS
3. TROUBLE FALLING OR STAYING ASLEEP: SEVERAL DAYS
6. FEELING BAD ABOUT YOURSELF - OR THAT YOU ARE A FAILURE OR HAVE LET YOURSELF OR YOUR FAMILY DOWN: NOT AT ALL
1. LITTLE INTEREST OR PLEASURE IN DOING THINGS: SEVERAL DAYS
8. MOVING OR SPEAKING SO SLOWLY THAT OTHER PEOPLE COULD HAVE NOTICED. OR THE OPPOSITE, BEING SO FIGETY OR RESTLESS THAT YOU HAVE BEEN MOVING AROUND A LOT MORE THAN USUAL: NOT AT ALL
SUM OF ALL RESPONSES TO PHQ QUESTIONS 1-9: 3
1. LITTLE INTEREST OR PLEASURE IN DOING THINGS: SEVERAL DAYS
5. POOR APPETITE OR OVEREATING: NOT AT ALL
10. IF YOU CHECKED OFF ANY PROBLEMS, HOW DIFFICULT HAVE THESE PROBLEMS MADE IT FOR YOU TO DO YOUR WORK, TAKE CARE OF THINGS AT HOME, OR GET ALONG WITH OTHER PEOPLE: SOMEWHAT DIFFICULT
10. IF YOU CHECKED OFF ANY PROBLEMS, HOW DIFFICULT HAVE THESE PROBLEMS MADE IT FOR YOU TO DO YOUR WORK, TAKE CARE OF THINGS AT HOME, OR GET ALONG WITH OTHER PEOPLE: SOMEWHAT DIFFICULT
SUM OF ALL RESPONSES TO PHQ QUESTIONS 1-9: 3
2. FEELING DOWN, DEPRESSED OR HOPELESS: NOT AT ALL
9. THOUGHTS THAT YOU WOULD BE BETTER OFF DEAD, OR OF HURTING YOURSELF: NOT AT ALL
7. TROUBLE CONCENTRATING ON THINGS, SUCH AS READING THE NEWSPAPER OR WATCHING TELEVISION: NOT AT ALL
6. FEELING BAD ABOUT YOURSELF - OR THAT YOU ARE A FAILURE OR HAVE LET YOURSELF OR YOUR FAMILY DOWN: NOT AT ALL
SUM OF ALL RESPONSES TO PHQ QUESTIONS 1-9: 3
2. FEELING DOWN, DEPRESSED OR HOPELESS: NOT AT ALL
3. TROUBLE FALLING OR STAYING ASLEEP: SEVERAL DAYS
9. THOUGHTS THAT YOU WOULD BE BETTER OFF DEAD, OR OF HURTING YOURSELF: NOT AT ALL
7. TROUBLE CONCENTRATING ON THINGS, SUCH AS READING THE NEWSPAPER OR WATCHING TELEVISION: NOT AT ALL
5. POOR APPETITE OR OVEREATING: NOT AT ALL

## 2025-08-06 ENCOUNTER — OFFICE VISIT (OUTPATIENT)
Dept: PRIMARY CARE CLINIC | Age: 30
End: 2025-08-06
Payer: COMMERCIAL

## 2025-08-06 VITALS
HEART RATE: 110 BPM | SYSTOLIC BLOOD PRESSURE: 110 MMHG | TEMPERATURE: 98 F | OXYGEN SATURATION: 98 % | HEIGHT: 63 IN | DIASTOLIC BLOOD PRESSURE: 74 MMHG | WEIGHT: 166 LBS | BODY MASS INDEX: 29.41 KG/M2 | RESPIRATION RATE: 16 BRPM

## 2025-08-06 DIAGNOSIS — Z3A.13 13 WEEKS GESTATION OF PREGNANCY: ICD-10-CM

## 2025-08-06 DIAGNOSIS — G43.109 MIGRAINE WITH AURA AND WITHOUT STATUS MIGRAINOSUS, NOT INTRACTABLE: ICD-10-CM

## 2025-08-06 DIAGNOSIS — K21.9 GASTROESOPHAGEAL REFLUX DISEASE WITHOUT ESOPHAGITIS: ICD-10-CM

## 2025-08-06 DIAGNOSIS — J45.20 MILD INTERMITTENT ASTHMA WITHOUT COMPLICATION: ICD-10-CM

## 2025-08-06 DIAGNOSIS — F41.9 ANXIETY: Primary | ICD-10-CM

## 2025-08-06 PROCEDURE — 99214 OFFICE O/P EST MOD 30 MIN: CPT | Performed by: NURSE PRACTITIONER

## 2025-08-06 RX ORDER — FERROUS SULFATE 325(65) MG
325 TABLET, DELAYED RELEASE (ENTERIC COATED) ORAL EVERY 24 HOURS
COMMUNITY
Start: 2025-07-17 | End: 2026-07-17

## 2025-08-06 RX ORDER — ONDANSETRON 8 MG/1
8 TABLET, ORALLY DISINTEGRATING ORAL EVERY 8 HOURS PRN
COMMUNITY
Start: 2025-07-11

## 2025-08-06 RX ORDER — ALBUTEROL SULFATE 90 UG/1
2 INHALANT RESPIRATORY (INHALATION) 4 TIMES DAILY PRN
Qty: 18 G | Refills: 3 | Status: SHIPPED | OUTPATIENT
Start: 2025-08-06

## 2025-08-06 RX ORDER — PROGESTERONE 200 MG/1
200 CAPSULE ORAL
COMMUNITY
Start: 2025-06-23

## 2025-08-06 SDOH — ECONOMIC STABILITY: FOOD INSECURITY: WITHIN THE PAST 12 MONTHS, THE FOOD YOU BOUGHT JUST DIDN'T LAST AND YOU DIDN'T HAVE MONEY TO GET MORE.: NEVER TRUE

## 2025-08-06 SDOH — ECONOMIC STABILITY: FOOD INSECURITY: WITHIN THE PAST 12 MONTHS, YOU WORRIED THAT YOUR FOOD WOULD RUN OUT BEFORE YOU GOT MONEY TO BUY MORE.: NEVER TRUE

## 2025-08-06 ASSESSMENT — PATIENT HEALTH QUESTIONNAIRE - PHQ9
SUM OF ALL RESPONSES TO PHQ QUESTIONS 1-9: 0
1. LITTLE INTEREST OR PLEASURE IN DOING THINGS: NOT AT ALL
SUM OF ALL RESPONSES TO PHQ QUESTIONS 1-9: 0
2. FEELING DOWN, DEPRESSED OR HOPELESS: NOT AT ALL

## 2025-08-06 ASSESSMENT — ENCOUNTER SYMPTOMS
VOMITING: 0
CONSTIPATION: 0
CHEST TIGHTNESS: 0
ABDOMINAL PAIN: 0
DIARRHEA: 0
BLOOD IN STOOL: 0
NAUSEA: 1
ABDOMINAL DISTENTION: 0
ANAL BLEEDING: 0
SHORTNESS OF BREATH: 0
WHEEZING: 0
COUGH: 0
APNEA: 0

## (undated) DEVICE — SOLUTION IRRIG 500ML 0.9% SOD CHLO USP POUR PLAS BTL

## (undated) DEVICE — ELECTRODE ELECSURG NDL 2.8 INX7.2 CM COAT INSUL EDGE

## (undated) DEVICE — GLOVE SURG SZ 75 L12IN FNGR THK94MIL STD WHT LTX FREE

## (undated) DEVICE — GLOVE SURG SZ 85 L12IN FNGR THK94MIL STD WHT LTX FREE

## (undated) DEVICE — NEEDLE HYPO 30GA L0.5IN BGE POLYPR HUB S STL REG BVL STR

## (undated) DEVICE — INTENDED USE FOR SURGICAL MARKING ON INTACT SKIN, ALSO PROVIDES A PERMANENT METHOD OF IDENTIFYING OBJECTS IN THE OPERATING ROOM: Brand: WRITESITE® PLUS MINI PREP RESISTANT MARKER

## (undated) DEVICE — TUBING SUCT 10FR MAL ALUM SHFT FN CAP VENT UNIV CONN W/ OBT

## (undated) DEVICE — SUTURE MONOCRYL SZ 3-0 L27IN ABSRB UD PS-2 3/8 CIR REV CUT NDL MCP427H

## (undated) DEVICE — LIQUIBAND RAPID ADHESIVE 36/CS 0.8ML: Brand: MEDLINE

## (undated) DEVICE — PENCIL ES CRD L10FT HND SWCHING ROCK SWCH W/ EDGE COAT BLDE

## (undated) DEVICE — GAUZE,SPONGE,4"X4",16PLY,STRL,LF,10/TRAY: Brand: MEDLINE

## (undated) DEVICE — SUTURE PROL SZ 4-0 L18IN NONABSORBABLE BLU L19MM PS-2 3/8 8682G

## (undated) DEVICE — SURGICAL PROCEDURE PACK IV U-BAR

## (undated) DEVICE — MINOR SET UP PACK: Brand: MEDLINE INDUSTRIES, INC.

## (undated) DEVICE — APPLICATOR MEDICATED 26 CC SOLUTION HI LT ORNG CHLORAPREP

## (undated) DEVICE — GOWN,SIRUS,NON REINFRCD,LARGE,SET IN SL: Brand: MEDLINE